# Patient Record
Sex: FEMALE | Race: BLACK OR AFRICAN AMERICAN | NOT HISPANIC OR LATINO | Employment: OTHER | URBAN - METROPOLITAN AREA
[De-identification: names, ages, dates, MRNs, and addresses within clinical notes are randomized per-mention and may not be internally consistent; named-entity substitution may affect disease eponyms.]

---

## 2021-01-04 ENCOUNTER — HOSPITAL ENCOUNTER (EMERGENCY)
Facility: HOSPITAL | Age: 76
Discharge: HOME | End: 2021-01-04
Attending: EMERGENCY MEDICINE | Admitting: EMERGENCY MEDICINE
Payer: COMMERCIAL

## 2021-01-04 VITALS
WEIGHT: 195.33 LBS | BODY MASS INDEX: 35.73 KG/M2 | DIASTOLIC BLOOD PRESSURE: 95 MMHG | SYSTOLIC BLOOD PRESSURE: 218 MMHG | RESPIRATION RATE: 20 BRPM | TEMPERATURE: 100.1 F | OXYGEN SATURATION: 98 % | HEART RATE: 85 BPM

## 2021-01-04 DIAGNOSIS — R04.0 EPISTAXIS: Primary | ICD-10-CM

## 2021-01-04 PROCEDURE — 99283 EMERGENCY DEPT VISIT LOW MDM: CPT

## 2021-01-04 PROCEDURE — 99282 EMERGENCY DEPT VISIT SF MDM: CPT | Performed by: PHYSICIAN ASSISTANT

## 2021-01-04 PROCEDURE — 30901 CONTROL OF NOSEBLEED: CPT | Performed by: PHYSICIAN ASSISTANT

## 2021-01-04 RX ORDER — OXYMETAZOLINE HYDROCHLORIDE 0.05 G/100ML
2 SPRAY NASAL ONCE
Status: COMPLETED | OUTPATIENT
Start: 2021-01-04 | End: 2021-01-04

## 2021-01-04 RX ADMIN — OXYMETAZOLINE HYDROCHLORIDE 2 SPRAY: 0.05 SPRAY NASAL at 16:06

## 2021-01-04 NOTE — ED PROVIDER NOTES
History  Chief Complaint   Patient presents with    Nose Bleed     pt states sneezed "real hard" and started with nose bleed from right nare  pt arrives with bleeding controlled and nasal clamp intact     77-year-old female with past medical history of stroke presenting for evaluation of epistaxis  Patient reports just prior to arrival she sneezed very hard causing nose bleed  Patient states that she called 911 and tempted to apply pressure with resolution however it started to rebleed while on the road to the emergency department for evaluation  Patient states that she is not on any anticoagulation at this time  Denies any headache or dizziness  No blurry vision  Denies any trauma or injury to the nose  None       Past Medical History:   Diagnosis Date    Stroke [facility])        History reviewed  No pertinent surgical history  History reviewed  No pertinent family history  I have reviewed and agree with the history as documented  E-Cigarette/Vaping     E-Cigarette/Vaping Substances     Social History     Tobacco Use    Smoking status: Never Smoker    Smokeless tobacco: Never Used   Substance Use Topics    Alcohol use: Never     Frequency: Never    Drug use: Never       Review of Systems   All other systems reviewed and are negative  Physical Exam  Physical Exam  Vitals signs and nursing note reviewed  Constitutional:       General: She is not in acute distress  Appearance: She is well-developed  HENT:      Head: Normocephalic and atraumatic  Nose:      Comments: Clamp initially visualized on the nose, examined each naris no active bleeding at this time     Mouth/Throat:      Comments: moderate amount of blood within the posterior pharynx and within the mouth  Eyes:      Conjunctiva/sclera: Conjunctivae normal       Comments: EOM grossly intact   Neck:      Musculoskeletal: Normal range of motion and neck supple  Vascular: No JVD     Cardiovascular:      Rate and Rhythm: Normal rate  Pulmonary:      Effort: Pulmonary effort is normal    Abdominal:      Palpations: Abdomen is soft  Musculoskeletal:      Comments: FROM, steady gait, cap refill brisk, strength and sensation grossly intact throughout   Skin:     General: Skin is warm and dry  Capillary Refill: Capillary refill takes less than 2 seconds  Neurological:      Mental Status: She is alert and oriented to person, place, and time  Psychiatric:         Behavior: Behavior normal          Vital Signs  ED Triage Vitals [01/04/21 1548]   Temperature Pulse Respirations Blood Pressure SpO2   100 1 °F (37 8 °C) 85 20 (!) 218/95 98 %      Temp Source Heart Rate Source Patient Position - Orthostatic VS BP Location FiO2 (%)   Tympanic Monitor Lying Left arm --      Pain Score       --           Vitals:    01/04/21 1548   BP: (!) 218/95   Pulse: 85   Patient Position - Orthostatic VS: Lying         Visual Acuity      ED Medications  Medications   oxymetazoline (AFRIN) 0 05 % nasal spray 2 spray (2 sprays Each Nare Given 1/4/21 1606)       Diagnostic Studies  Results Reviewed     None                 No orders to display              Procedures  Procedures         ED Course                                           MDM  Number of Diagnoses or Management Options  Epistaxis:   Diagnosis management comments: 57-year-old female presenting for evaluation after epistaxis caused from sneezing too hard, patient bleeding was controlled however started to rebleed in ambulance ride to the emergency department after removing a clot from her nose  Patient had no bleeding at the time of presentation to emergency department, did still use Afrin soaked gauze in and there with clamp on the nose, she is well appearing, son at bedside states that she takes baby asa every other day, no longer takes anticoagulants because they caused multiple episodes of epistaxis, f/u with pcp as an outpatient    strict return to ED precautions discussed  Pt verbalizes understanding and agrees with plan  Pt is stable for discharge    Portions of the record may have been created with voice recognition software  Occasional wrong word or "sound a like" substitutions may have occurred due to the inherent limitations of voice recognition software  Read the chart carefully and recognize, using context, where substitutions have occurred  Disposition  Final diagnoses:   Epistaxis     Time reflects when diagnosis was documented in both MDM as applicable and the Disposition within this note     Time User Action Codes Description Comment    1/4/2021  4:48 PM Rebecca  Add [R04 0] Epistaxis       ED Disposition     ED Disposition Condition Date/Time Comment    Discharge Stable Mon Jan 4, 2021  4:48 PM Pavel Sky discharge to home/self care  Follow-up Information     Follow up With Specialties Details Why Contact Info Additional [address] Family Medicine Call in 1 day  [address], [address] [facility] [address]  [address], [address], [address] [place], [place], [place] [facility] Emergency [facility] Emergency Medicine Go to  If symptoms worsen [address] [address] [phone]           Patient's Medications    No medications on file     No discharge procedures on file      PDMP Review     None          ED Provider  Electronically Signed by           Petty Valdovinos PA-C  01/04/21 6666

## 2021-05-18 ENCOUNTER — APPOINTMENT (EMERGENCY)
Dept: CT IMAGING | Facility: HOSPITAL | Age: 76
End: 2021-05-18
Payer: COMMERCIAL

## 2021-05-18 ENCOUNTER — HOSPITAL ENCOUNTER (OUTPATIENT)
Facility: HOSPITAL | Age: 76
Setting detail: OBSERVATION
Discharge: HOME | End: 2021-05-19
Attending: EMERGENCY MEDICINE | Admitting: STUDENT IN AN ORGANIZED HEALTH CARE EDUCATION/TRAINING PROGRAM
Payer: COMMERCIAL

## 2021-05-18 DIAGNOSIS — R56.9 SEIZURE-LIKE ACTIVITY (HCC): Primary | ICD-10-CM

## 2021-05-18 PROBLEM — E78.5 HYPERLIPIDEMIA: Status: ACTIVE | Noted: 2021-05-18

## 2021-05-18 PROBLEM — I48.91 ATRIAL FIBRILLATION (HCC): Status: ACTIVE | Noted: 2021-05-18

## 2021-05-18 PROBLEM — D86.89 NEUROSARCOIDOSIS: Status: ACTIVE | Noted: 2021-05-18

## 2021-05-18 PROBLEM — I63.9 STROKE (HCC): Status: ACTIVE | Noted: 2021-05-18

## 2021-05-18 PROBLEM — E03.9 HYPOTHYROIDISM: Status: ACTIVE | Noted: 2021-05-18

## 2021-05-18 PROBLEM — I50.22 CHRONIC SYSTOLIC HEART FAILURE (HCC): Status: ACTIVE | Noted: 2021-05-18

## 2021-05-18 PROBLEM — I10 ESSENTIAL HYPERTENSION: Status: ACTIVE | Noted: 2021-05-18

## 2021-05-18 LAB
ALBUMIN SERPL BCP-MCNC: 3.7 G/DL (ref 3.5–5)
ALP SERPL-CCNC: 199 U/L (ref 46–116)
ALT SERPL W P-5'-P-CCNC: 18 U/L (ref 12–78)
ANION GAP SERPL CALCULATED.3IONS-SCNC: 8 MMOL/L (ref 4–13)
AST SERPL W P-5'-P-CCNC: 16 U/L (ref 5–45)
ATRIAL RATE: 159 BPM
ATRIAL RATE: 326 BPM
ATRIAL RATE: 340 BPM
BASOPHILS # BLD AUTO: 0.05 THOUSANDS/ΜL (ref 0–0.1)
BASOPHILS NFR BLD AUTO: 1 % (ref 0–1)
BILIRUB SERPL-MCNC: 0.4 MG/DL (ref 0.2–1)
BUN SERPL-MCNC: 9 MG/DL (ref 5–25)
CALCIUM SERPL-MCNC: 8.5 MG/DL (ref 8.3–10.1)
CHLORIDE SERPL-SCNC: 104 MMOL/L (ref 100–108)
CO2 SERPL-SCNC: 32 MMOL/L (ref 21–32)
CREAT SERPL-MCNC: 1.15 MG/DL (ref 0.6–1.3)
EOSINOPHIL # BLD AUTO: 0.2 THOUSAND/ΜL (ref 0–0.61)
EOSINOPHIL NFR BLD AUTO: 2 % (ref 0–6)
ERYTHROCYTE [DISTWIDTH] IN BLOOD BY AUTOMATED COUNT: 15.4 % (ref 11.6–15.1)
GFR SERPL CREATININE-BSD FRML MDRD: 53 ML/MIN/1.73SQ M
GLUCOSE SERPL-MCNC: 160 MG/DL (ref 65–140)
GLUCOSE SERPL-MCNC: 161 MG/DL (ref 65–140)
HCT VFR BLD AUTO: 42.9 % (ref 34.8–46.1)
HGB BLD-MCNC: 13.6 G/DL (ref 11.5–15.4)
IMM GRANULOCYTES # BLD AUTO: 0.08 THOUSAND/UL (ref 0–0.2)
IMM GRANULOCYTES NFR BLD AUTO: 1 % (ref 0–2)
LYMPHOCYTES # BLD AUTO: 1.82 THOUSANDS/ΜL (ref 0.6–4.47)
LYMPHOCYTES NFR BLD AUTO: 19 % (ref 14–44)
MCH RBC QN AUTO: 30.8 PG (ref 26.8–34.3)
MCHC RBC AUTO-ENTMCNC: 31.7 G/DL (ref 31.4–37.4)
MCV RBC AUTO: 97 FL (ref 82–98)
MONOCYTES # BLD AUTO: 0.69 THOUSAND/ΜL (ref 0.17–1.22)
MONOCYTES NFR BLD AUTO: 7 % (ref 4–12)
NEUTROPHILS # BLD AUTO: 6.84 THOUSANDS/ΜL (ref 1.85–7.62)
NEUTS SEG NFR BLD AUTO: 70 % (ref 43–75)
NRBC BLD AUTO-RTO: 0 /100 WBCS
PHENYTOIN SERPL-MCNC: 19.5 UG/ML (ref 10–20)
PLATELET # BLD AUTO: 173 THOUSANDS/UL (ref 149–390)
PMV BLD AUTO: 9.9 FL (ref 8.9–12.7)
POTASSIUM SERPL-SCNC: 3.3 MMOL/L (ref 3.5–5.3)
PROT SERPL-MCNC: 7.4 G/DL (ref 6.4–8.2)
QRS AXIS: -43 DEGREES
QRS AXIS: -44 DEGREES
QRS AXIS: -50 DEGREES
QRSD INTERVAL: 144 MS
QRSD INTERVAL: 146 MS
QRSD INTERVAL: 146 MS
QT INTERVAL: 418 MS
QT INTERVAL: 442 MS
QT INTERVAL: 470 MS
QTC INTERVAL: 428 MS
QTC INTERVAL: 430 MS
QTC INTERVAL: 431 MS
RBC # BLD AUTO: 4.41 MILLION/UL (ref 3.81–5.12)
SODIUM SERPL-SCNC: 144 MMOL/L (ref 136–145)
T WAVE AXIS: 149 DEGREES
T WAVE AXIS: 150 DEGREES
T WAVE AXIS: 177 DEGREES
TSH SERPL DL<=0.05 MIU/L-ACNC: 0.04 UIU/ML (ref 0.36–3.74)
VENTRICULAR RATE: 50 BPM
VENTRICULAR RATE: 57 BPM
VENTRICULAR RATE: 64 BPM
WBC # BLD AUTO: 9.68 THOUSAND/UL (ref 4.31–10.16)

## 2021-05-18 PROCEDURE — 80053 COMPREHEN METABOLIC PANEL: CPT | Performed by: EMERGENCY MEDICINE

## 2021-05-18 PROCEDURE — 93005 ELECTROCARDIOGRAM TRACING: CPT

## 2021-05-18 PROCEDURE — 99205 OFFICE O/P NEW HI 60 MIN: CPT | Performed by: NURSE PRACTITIONER

## 2021-05-18 PROCEDURE — 93010 ELECTROCARDIOGRAM REPORT: CPT | Performed by: INTERNAL MEDICINE

## 2021-05-18 PROCEDURE — 80185 ASSAY OF PHENYTOIN TOTAL: CPT | Performed by: EMERGENCY MEDICINE

## 2021-05-18 PROCEDURE — 99220 PR INITIAL OBSERVATION CARE/DAY 70 MINUTES: CPT | Performed by: STUDENT IN AN ORGANIZED HEALTH CARE EDUCATION/TRAINING PROGRAM

## 2021-05-18 PROCEDURE — 82948 REAGENT STRIP/BLOOD GLUCOSE: CPT

## 2021-05-18 PROCEDURE — 85025 COMPLETE CBC W/AUTO DIFF WBC: CPT | Performed by: EMERGENCY MEDICINE

## 2021-05-18 PROCEDURE — 96374 THER/PROPH/DIAG INJ IV PUSH: CPT

## 2021-05-18 PROCEDURE — 84443 ASSAY THYROID STIM HORMONE: CPT | Performed by: EMERGENCY MEDICINE

## 2021-05-18 PROCEDURE — 36415 COLL VENOUS BLD VENIPUNCTURE: CPT | Performed by: EMERGENCY MEDICINE

## 2021-05-18 PROCEDURE — 99285 EMERGENCY DEPT VISIT HI MDM: CPT

## 2021-05-18 PROCEDURE — 80175 DRUG SCREEN QUAN LAMOTRIGINE: CPT | Performed by: PHYSICIAN ASSISTANT

## 2021-05-18 PROCEDURE — 99285 EMERGENCY DEPT VISIT HI MDM: CPT | Performed by: EMERGENCY MEDICINE

## 2021-05-18 RX ORDER — HYDRALAZINE HYDROCHLORIDE 20 MG/ML
5 INJECTION INTRAMUSCULAR; INTRAVENOUS EVERY 6 HOURS PRN
Status: DISCONTINUED | OUTPATIENT
Start: 2021-05-18 | End: 2021-05-19 | Stop reason: HOSPADM

## 2021-05-18 RX ORDER — LAMOTRIGINE 200 MG/1
250 TABLET ORAL 2 TIMES DAILY
COMMUNITY

## 2021-05-18 RX ORDER — HYDRALAZINE HYDROCHLORIDE 20 MG/ML
5 INJECTION INTRAMUSCULAR; INTRAVENOUS ONCE
Status: COMPLETED | OUTPATIENT
Start: 2021-05-18 | End: 2021-05-18

## 2021-05-18 RX ORDER — LORATADINE 10 MG/1
10 TABLET ORAL DAILY
Status: DISCONTINUED | OUTPATIENT
Start: 2021-05-19 | End: 2021-05-19 | Stop reason: HOSPADM

## 2021-05-18 RX ORDER — FUROSEMIDE 40 MG/1
40 TABLET ORAL DAILY
Status: DISCONTINUED | OUTPATIENT
Start: 2021-05-19 | End: 2021-05-19 | Stop reason: HOSPADM

## 2021-05-18 RX ORDER — LEVOTHYROXINE SODIUM 88 UG/1
88 TABLET ORAL
Status: DISCONTINUED | OUTPATIENT
Start: 2021-05-19 | End: 2021-05-19 | Stop reason: HOSPADM

## 2021-05-18 RX ORDER — POTASSIUM CHLORIDE 750 MG/1
10 CAPSULE, EXTENDED RELEASE ORAL 3 TIMES DAILY
COMMUNITY

## 2021-05-18 RX ORDER — ATORVASTATIN CALCIUM 10 MG/1
10 TABLET, FILM COATED ORAL DAILY
COMMUNITY

## 2021-05-18 RX ORDER — LORAZEPAM 1 MG/1
0.5 TABLET ORAL EVERY 8 HOURS PRN
COMMUNITY

## 2021-05-18 RX ORDER — ATORVASTATIN CALCIUM 10 MG/1
10 TABLET, FILM COATED ORAL
Status: DISCONTINUED | OUTPATIENT
Start: 2021-05-18 | End: 2021-05-19 | Stop reason: HOSPADM

## 2021-05-18 RX ORDER — LEVOTHYROXINE SODIUM 0.05 MG/1
50 TABLET ORAL DAILY
COMMUNITY

## 2021-05-18 RX ORDER — LOSARTAN POTASSIUM 100 MG/1
100 TABLET ORAL DAILY
COMMUNITY

## 2021-05-18 RX ORDER — ALLOPURINOL 100 MG/1
100 TABLET ORAL DAILY
Status: DISCONTINUED | OUTPATIENT
Start: 2021-05-19 | End: 2021-05-19 | Stop reason: HOSPADM

## 2021-05-18 RX ORDER — PHENYTOIN SODIUM 100 MG/1
100 CAPSULE, EXTENDED RELEASE ORAL DAILY
COMMUNITY

## 2021-05-18 RX ORDER — LORAZEPAM 1 MG/1
0.5 TABLET ORAL EVERY 8 HOURS PRN
Status: DISCONTINUED | OUTPATIENT
Start: 2021-05-18 | End: 2021-05-19 | Stop reason: HOSPADM

## 2021-05-18 RX ORDER — ASPIRIN 81 MG/1
81 TABLET ORAL EVERY OTHER DAY
COMMUNITY

## 2021-05-18 RX ORDER — LEVOTHYROXINE SODIUM 0.1 MG/1
100 TABLET ORAL
Status: DISCONTINUED | OUTPATIENT
Start: 2021-05-19 | End: 2021-05-18

## 2021-05-18 RX ORDER — FAMOTIDINE 20 MG/1
20 TABLET, FILM COATED ORAL
Status: DISCONTINUED | OUTPATIENT
Start: 2021-05-19 | End: 2021-05-19 | Stop reason: HOSPADM

## 2021-05-18 RX ORDER — FAMOTIDINE 20 MG/1
20 TABLET, FILM COATED ORAL 2 TIMES DAILY
COMMUNITY

## 2021-05-18 RX ORDER — PHENYTOIN SODIUM 100 MG/1
200 CAPSULE, EXTENDED RELEASE ORAL
Status: DISCONTINUED | OUTPATIENT
Start: 2021-05-20 | End: 2021-05-19 | Stop reason: HOSPADM

## 2021-05-18 RX ORDER — AMLODIPINE BESYLATE 5 MG/1
5 TABLET ORAL DAILY
Status: DISCONTINUED | OUTPATIENT
Start: 2021-05-19 | End: 2021-05-19 | Stop reason: HOSPADM

## 2021-05-18 RX ORDER — ASPIRIN 81 MG/1
81 TABLET ORAL EVERY OTHER DAY
Status: DISCONTINUED | OUTPATIENT
Start: 2021-05-18 | End: 2021-05-19 | Stop reason: HOSPADM

## 2021-05-18 RX ORDER — NYSTATIN 100000 [USP'U]/G
POWDER TOPICAL AS NEEDED
COMMUNITY

## 2021-05-18 RX ORDER — PHENYTOIN SODIUM 100 MG/1
100 CAPSULE, EXTENDED RELEASE ORAL
Status: DISCONTINUED | OUTPATIENT
Start: 2021-05-19 | End: 2021-05-19 | Stop reason: HOSPADM

## 2021-05-18 RX ORDER — FUROSEMIDE 40 MG/1
40 TABLET ORAL DAILY
COMMUNITY

## 2021-05-18 RX ORDER — ALLOPURINOL 100 MG/1
100 TABLET ORAL DAILY
COMMUNITY

## 2021-05-18 RX ORDER — NYSTATIN 100000 [USP'U]/G
POWDER TOPICAL 2 TIMES DAILY
Status: DISCONTINUED | OUTPATIENT
Start: 2021-05-18 | End: 2021-05-19 | Stop reason: HOSPADM

## 2021-05-18 RX ORDER — AMLODIPINE BESYLATE 5 MG/1
5 TABLET ORAL DAILY
COMMUNITY

## 2021-05-18 RX ORDER — LOSARTAN POTASSIUM 50 MG/1
100 TABLET ORAL DAILY
Status: DISCONTINUED | OUTPATIENT
Start: 2021-05-19 | End: 2021-05-19 | Stop reason: HOSPADM

## 2021-05-18 RX ORDER — PREDNISONE 1 MG/1
7.5 TABLET ORAL DAILY
COMMUNITY

## 2021-05-18 RX ORDER — FEXOFENADINE HCL 180 MG/1
180 TABLET ORAL DAILY
COMMUNITY
End: 2022-08-05 | Stop reason: ALTCHOICE

## 2021-05-18 RX ADMIN — ATORVASTATIN CALCIUM 10 MG: 10 TABLET, FILM COATED ORAL at 18:05

## 2021-05-18 RX ADMIN — LORAZEPAM 0.5 MG: 1 TABLET ORAL at 22:08

## 2021-05-18 RX ADMIN — HYDRALAZINE HYDROCHLORIDE 5 MG: 20 INJECTION, SOLUTION INTRAMUSCULAR; INTRAVENOUS at 12:19

## 2021-05-18 RX ADMIN — LAMOTRIGINE 250 MG: 25 TABLET ORAL at 18:04

## 2021-05-18 RX ADMIN — NYSTATIN: 100000 POWDER TOPICAL at 18:04

## 2021-05-18 RX ADMIN — ASPIRIN 81 MG: 81 TABLET ORAL at 18:04

## 2021-05-18 NOTE — ED NOTES
Messaged Dr Jeff Grace about patient's blood pressure states okay to transfer to inpatient bed with that pressure     Uday Edwards RN  05/18/21 2896

## 2021-05-18 NOTE — CONSULTS
Consultation - Stroke   Tory Glover 66 y o  female MRN: [id number]  Unit/Bed#: ED 20 Encounter: [id number]      Assessment/Plan     Seizure-like activity [facility])  Assessment & Plan  Tory Glover is a 78-year-old female with AFib, neurosarcoidosis, seizures, who presents today with reported seizure at home per patient  History obtained from son  Son reports that patient was in her normal state of health this morning arose from bed ate breakfast, and then roughly around 9:40 a m  Patient with severe  anxiety and agitation, yelling "I am not okay, something is wrong"  Patient reportedly stated to son that she had a seizure  This was not witnessed by son  Son reports there was no incontinence or tongue bite evident however he phoned PCP who stated that EMS should be called and transferred to [facility] ED for further evaluation  On arrival to the ED patient hypertensive 208/99  Patient's son reports this is abnormal for her as he checked her blood pressure at home  He did check her blood pressure this morning when she was feeling anxious and his reading was in the 759 systolic  Patient has had documented allergy to iodine  Therefore CTA was not performed in the ED  CT head unremarkable for acute abnormalities  Given patient's return to baseline, more concern for breakthrough seizure than stroke at this time  Can discontinue stroke pathway at this time  Plan as outlined below  Plan:  - Will check lamotrigine and phenytoin level  - Unable to obtain CTA at this time given patient has iodine allergy    - Current AED regimen: ( Son reports that the patient took both Lamictal and dilantin this am prior to arrival at hospital)   - Lamictal 250 mg b i d    - Dilantin 100 mg extended release capsule (1 capsule Monday, Wednesday, Friday, 2 capsules Tuesday Thursday Saturday Sunday)  - Administer Ativan prn seizure like activity > 2 min    - Continue telemetry  - Recommend blood pressure control, avoid hypotension  - Continue seizure precautions   - [employer] form not applicable, patient does not drive  - Medical management supportive care per primary team  - Please contact Neurology with any acute change in mental status  TPA Decision: Patient not a TPA candidate  Son present at bedside and reports patient is at baseline  Recommendations for outpatient neurological follow up have yet to be determined  History of Present Illness     Reason for Consult / Principal Problem: stroke alert  Hx and PE limited by: patients son in present  Patient last known well: 940  Stroke alert called: 1275  Neurology time of arrival: 1045 AP arrived  Attending neurologist available via phone immediately  HPI:  Heriberto Grier is a 58-year-old female with AFib, neurosarcoidosis, seizures, who presents today with reported seizure at home per patient  History obtained from son  Son reports that patient was in her normal state of health this morning arose from bed ate breakfast, and then roughly around 9:40 a m  Patient with severe  anxiety and agitation, yelling "I am not okay, something is wrong"  Patient reportedly stated to son that she had a seizure  This was not witnessed by son  Son reports there was no incontinence or tongue bite evident however he phoned PCP who stated that EMS should be called and transferred to [facility] ED for further evaluation  On arrival to the ED patient hypertensive 208/99  NIHSS 6 Patient's son reports this is abnormal for her as he checked her blood pressure at home  He did check her blood pressure this morning when she was feeling anxious and his reading was in the 612 systolic  Patient has had documented allergy to iodine  Therefore CTA was not performed in the ED  CT head unremarkable for acute abnormalities  Of note, patient had chronic meningitis in the early 2000s, eventrally requiring a  shunt    In addition, in 2013 patient suffered a left frontal intracranial hemorrhage that was thought to be secondary to her warfarin at that time which she was on for atrial fibrillation  This hemorrhage was thought to be spontaneous, there was no trauma or obvious trigger  Following this intracranial hemorrhage patient had persistent right-sided weakness and speech problems  Patient was never started on anticoagulation after this episode, and was rather continued on 81 mg daily anti-platelet therapy  Approximately 1 year following her intracranial hemorrhage patient developed seizures which consisted of right arm face jerking and head turn to the right  She was initially started on Keppra which caused her some fatigue  Subsequently this was switched to Dilantin with Lamictal for better seizure control  Per son patient has not had a seizure in 2-3 years  Patient is compliant with medications  Consults    Review of Systems   Constitutional: Negative for activity change, appetite change, chills, diaphoresis, fatigue, fever and unexpected weight change  HENT: Positive for hearing loss  Negative for facial swelling and trouble swallowing  Respiratory: Negative for apnea, chest tightness and shortness of breath  Cardiovascular: Negative for chest pain, palpitations and leg swelling  Gastrointestinal: Negative for diarrhea, nausea and vomiting  Genitourinary: Negative for difficulty urinating  Musculoskeletal: Positive for gait problem  Negative for neck pain and neck stiffness  Neurological: Positive for seizures, facial asymmetry, speech difficulty and weakness  Negative for dizziness, tremors, syncope, light-headedness, numbness and headaches  Psychiatric/Behavioral: The patient is not nervous/anxious  Historical Information   Past Medical History:   Diagnosis Date    Seizure ([address] )     Stroke [facility])      History reviewed  No pertinent surgical history    Social History   Social History     Substance and Sexual Activity   Alcohol Use Never    Frequency: Never     Social History     Substance and Sexual Activity   Drug Use Never     E-Cigarette/Vaping     E-Cigarette/Vaping Substances     Social History     Tobacco Use   Smoking Status Never Smoker   Smokeless Tobacco Never Used     Family History: History reviewed  No pertinent family history  Review of previous medical records was  completed  Meds/Allergies   all current active meds have been reviewed, current meds:   No current facility-administered medications for this encounter  and PTA meds:   Prior to Admission Medications   Prescriptions Last Dose Informant Patient Reported? Taking?    LORazepam (ATIVAN) 1 mg tablet 5/18/2021 at Unknown time  Yes Yes   Sig: Take 0 5 mg by mouth every 8 (eight) hours as needed for anxiety   allopurinol (ZYLOPRIM) 100 mg tablet 5/18/2021 at Unknown time  Yes Yes   Sig: Take 100 mg by mouth daily   amLODIPine (NORVASC) 5 mg tablet 5/18/2021 at Unknown time  Yes Yes   Sig: Take 5 mg by mouth daily   aspirin (ECOTRIN LOW STRENGTH) 81 mg EC tablet 5/18/2021 at Unknown time  Yes Yes   Sig: Take 81 mg by mouth every other day   atorvastatin (LIPITOR) 10 mg tablet 5/18/2021 at Unknown time  Yes Yes   Sig: Take 10 mg by mouth daily   famotidine (PEPCID) 20 mg tablet 5/18/2021 at Unknown time  Yes Yes   Sig: Take 20 mg by mouth 2 (two) times a day   fexofenadine (ALLEGRA) 180 MG tablet 5/17/2021 at Unknown time  Yes Yes   Sig: Take 180 mg by mouth daily   furosemide (LASIX) 40 mg tablet 5/17/2021 at Unknown time  Yes Yes   Sig: Take 40 mg by mouth daily   lamoTRIgine (LaMICtal) 200 MG tablet 5/18/2021 at Unknown time  Yes Yes   Sig: Take 250 mg by mouth 2 (two) times a day   levothyroxine 50 mcg tablet 5/18/2021 at Unknown time  Yes Yes   Sig: Take 50 mcg by mouth daily   losartan (COZAAR) 100 MG tablet 5/18/2021 at Unknown time  Yes Yes   Sig: Take 100 mg by mouth daily   nystatin (MYCOSTATIN) powder   Yes Yes   Sig: Apply topically as needed phenytoin (DILANTIN) 100 mg ER capsule 5/18/2021 at Unknown time  Yes Yes   Sig: Take 100 mg by mouth daily Monday, Wednesday & Friday 100mg  Tuesday, Thursday, Saturday and Sunday- 200mg   potassium chloride (MICRO-K) 10 MEQ CR capsule 5/17/2021 at Unknown time  Yes Yes   Sig: Take 10 mEq by mouth 3 (three) times a day   predniSONE 1 mg tablet 5/18/2021 at Unknown time  Yes Yes   Sig: Take 7 5 mg by mouth daily      Facility-Administered Medications: None       Allergies   Allergen Reactions    Iodine - Food Allergy        Objective   Vitals:Blood pressure (!) 215/84, pulse 57, resp  rate 19, SpO2 98 %  ,There is no height or weight on file to calculate BMI  No intake or output data in the 24 hours ending 05/18/21 1148    Invasive Devices: Invasive Devices     Peripheral Intravenous Line            Peripheral IV 05/18/21 Left Antecubital less than 1 day                Physical Exam  Vitals signs and nursing note reviewed  Constitutional:       General: She is not in acute distress  Appearance: She is not ill-appearing, toxic-appearing or diaphoretic  HENT:      Head: Normocephalic  Right Ear: External ear normal       Left Ear: External ear normal       Nose: Nose normal       Mouth/Throat:      Mouth: Mucous membranes are moist    Eyes:      General: No scleral icterus  Extraocular Movements: Extraocular movements intact  Pupils: Pupils are equal, round, and reactive to light  Neck:      Musculoskeletal: Normal range of motion  Cardiovascular:      Rate and Rhythm: Normal rate  Pulses: Normal pulses  Pulmonary:      Effort: Pulmonary effort is normal  No respiratory distress  Abdominal:      Palpations: Abdomen is soft  Musculoskeletal: Normal range of motion  Right lower leg: No edema  Left lower leg: No edema  Skin:     General: Skin is warm and dry  Capillary Refill: Capillary refill takes less than 2 seconds     Neurological:      Mental Status: She is alert       Deep Tendon Reflexes:      Reflex Scores:       Tricep reflexes are 3+ on the right side and 2+ on the left side  Bicep reflexes are 3+ on the right side and 2+ on the left side  Brachioradialis reflexes are 3+ on the right side and 2+ on the left side  Patellar reflexes are 2+ on the right side and 2+ on the left side  Comments: Outlined below   Psychiatric:         Speech: Speech is slurred  Neurologic Exam     Mental Status   Follows 1 step commands  Attention: normal  Concentration: normal    Speech: slurred   Level of consciousness: alert  Knowledge: good  Able to name object  Able to read  Able to repeat  Patient is awake on patient able states that is 2021 she is disoriented to month date  Patient able to state birth date and that she is in the hospital   Patient is able to read, identify objects and colors, and repeat phrases  Dysarthria noted, which is chronic for patient  Cranial Nerves     CN II   Visual fields full to confrontation  CN III, IV, VI   Pupils are equal, round, and reactive to light  Right pupil: Size: 4 mm  Shape: regular  Left pupil: Size: 4 mm  Shape: regular  Nystagmus: none   Upgaze: normal  Downgaze: normal  Conjugate gaze: present    CN V   Facial sensation intact  CN VII   Right facial weakness: central    CN VIII   Hearing: impaired    CN XII   CN XII normal      Motor Exam   Muscle bulk: decreased  Right arm tone: increased  Left arm tone: normal  Right arm pronator drift: Unable to test given right-sided weakness secondary to hemorrhagic stroke  Left arm pronator drift: absent  Right leg tone: increased  Left leg tone: normal  Left upper extremity 5/5, left lower extremity 5/5  Right upper extremity 5-out of 5 proximal strength  Right  strength 3/5, consistent with baseline per son who was at bedside  Right lower extremity 5-/5 throughout       Sensory Exam   Light touch normal    Pinprick normal  Gait, Coordination, and Reflexes     Reflexes   Right brachioradialis: 3+  Left brachioradialis: 2+  Right biceps: 3+  Left biceps: 2+  Right triceps: 3+  Left triceps: 2+  Right patellar: 2+  Left patellar: 2+  Right plantar: normal  Left plantar: normal  Right ankle clonus: absent  Left ankle clonus: absent  Gait exam deferred at this time  Unable to test finger-to-nose and heel-to-shin coordination on right side given residual weakness  However finger-to-nose and heel-to-shin on left intact  No tremor noted  NIHSS:  1a Level of Consciousness: 0 = Alert   1b  LOC Questions: 1 = Answers one correctly   1c  LOC Commands: 0 = Obeys both correctly   2  Best Gaze: 0 = Normal   3  Visual: 0 = No visual field loss   4  Facial Palsy: 1=Minor paralysis (flattened nasolabial fold, asymmetric on smiling)   5a  Motor Right Arm: 2=Some effort against gravity, limb cannot get to or maintain (if cured) 90 (or 45) degrees, drifts down to bed, but has some effort against gravity   5b  Motor Left Arm: 0=No drift, limb holds 90 (or 45) degrees for full 10 seconds   6a  Motor Right Le=No drift, limb holds 90 (or 45) degrees for full 10 seconds   6b  Motor Left Le=No drift, limb holds 90 (or 45) degrees for full 10 seconds   7  Limb Ataxia:  0=Absent   8  Sensory: 0=Normal; no sensory loss   9  Best Language:  1=Mild to moderate aphasia; some obvious loss of fluency or facility of comprehension without significant limitation on ideas expressed or form of expression  10  Dysarthria: 1=Mild to moderate, patient slurs at least some words and at worst, can be understood with some difficulty   11   Extinction and Inattention (formerly Neglect): 0=No abnormality   Total Score: 6     Time NIHSS was completed: 1050    Modified Whigham Score:  3 (Moderate disability; requiring some help, but able to walk without assistance)    Lab Results:   CBC:   Results from last 7 days   Lab Units 21  1102   WBC Thousand/uL 9  68   RBC Million/uL 4 41   HEMOGLOBIN g/dL 13 6   HEMATOCRIT % 42 9   MCV fL 97   PLATELETS Thousands/uL 173     Imaging Studies: I have personally reviewed pertinent reports  and I have personally reviewed pertinent films in PACS  EKG, Pathology, and Other Studies: I have personally reviewed pertinent reports  and I have personally reviewed pertinent films in PACS  VTE Prophylaxis: Sequential compression device (Venodyne)     Counseling / Coordination of Care  Total Critical Care time spent 50 minutes excluding procedures, teaching and family updates

## 2021-05-18 NOTE — H&P
[address] [place] [address]  H&P- Siddhartha Stones 1945, 66 y o  female MRN: [id number]  Unit/Bed#: E4 -01 Encounter: [id number]  Primary Care Provider: Nehemiah Little MD   Date and time admitted to hospital: 5/18/2021 10:24 AM    * Seizure-like activity [facility])  Assessment & Plan  66year old female with PMH of neurosarcoidosis, warfarin-associated ICH, and post- hemorrhagic epilepsy presented with seizure-like activity   - Neurology on board assisting with management  - Continue dilantin and lamictal  - Await levels    Neurosarcoidosis  Assessment & Plan  Continue steroids    Chronic systolic heart failure (HCC)  Assessment & Plan  Wt Readings from Last 3 Encounters:   05/18/21 75 8 kg (167 lb)   01/04/21 88 6 kg (195 lb 5 2 oz)   07/30/13 95 3 kg (210 lb)     Not in acute exacerbation   - Continue lasix        Atrial fibrillation (HCC)  Assessment & Plan  Rate controlled  Not on ac due to cerebral bleed    Hyperlipidemia  Assessment & Plan  Continue statin    Hypothyroidism  Assessment & Plan  Decreased levothyroxine from 100mcg to 88mcg daily due to suppressed tsh levels    Essential hypertension  Assessment & Plan  Continue losartan / amlodipine  - PRN hydralazine    Stroke [facility])  Assessment & Plan  History of stroke with residual deficits  VTE Prophylaxis: Pharmacologic VTE Prophylaxis contraindicated due to history of intracranial bleed  / sequential compression device   Code Status: full code  Discussion with family: shannan garcia    Anticipated Length of Stay:  Patient will be admitted on an Observation basis with an anticipated length of stay of  < 2 midnights  Justification for Hospital Stay: seizure like symptoms    Total Time for Visit, including Counseling / Coordination of Care: 70 minutes  Greater than 50% of this total time spent on direct patient counseling and coordination of care      Chief Complaint:   Seizure like symptoms    History of Present Illness:    Siddhartha Daugherty is a 66 y o  female who presents with seizure like symptoms  She has a past medical history of atrial fibrillation not on anticoagulation due to intracranial bleed, neurosarcoidosis, seizures, and epilepsy  Patient unable to provide her history accurately thus was obtained through her son  Her son Bia Delgado neuro rates that she was in her normal state of health  Until around [phone] this morning when the patient started complaining that something is wrong  She told her son Bia Delgado that she believes she had a seizure  This was not witnessed  She did not lose any consciousness  There was no incontinence  In the ED, she had hypertensive urgency requiring IV hydralazine for better blood pressure control  CT head was performed which did not show any acute a acute abnormalities  CTA could not be performed because of her iodine allergies  Neurology would like her admitted for further observation due to concern for breakthrough seizure  Review of Systems:    Review of Systems   Constitutional: Negative for appetite change and chills  HENT: Negative for ear pain  Eyes: Negative for pain  Respiratory: Negative for chest tightness, shortness of breath and wheezing  Cardiovascular: Negative for chest pain and palpitations  Gastrointestinal: Negative for abdominal pain, nausea and vomiting  Endocrine: Negative for cold intolerance  Genitourinary: Negative for dysuria  Musculoskeletal: Negative for neck stiffness  Skin: Negative for color change and pallor  Neurological: Positive for seizures and weakness  Psychiatric/Behavioral: Negative for agitation  Past Medical and Surgical History:     Past Medical History:   Diagnosis Date    Seizure ([address] )     Stroke [facility])        History reviewed  No pertinent surgical history  Meds/Allergies:    Prior to Admission medications    Medication Sig Start Date End Date Taking?  Authorizing Provider   allopurinol (ZYLOPRIM) 100 mg tablet Take 100 mg by mouth daily   Yes Historical Provider, MD   amLODIPine (NORVASC) 5 mg tablet Take 5 mg by mouth daily   Yes Historical Provider, MD   aspirin (ECOTRIN LOW STRENGTH) 81 mg EC tablet Take 81 mg by mouth every other day   Yes Historical Provider, MD   atorvastatin (LIPITOR) 10 mg tablet Take 10 mg by mouth daily   Yes Historical Provider, MD   famotidine (PEPCID) 20 mg tablet Take 20 mg by mouth 2 (two) times a day   Yes Historical Provider, MD   fexofenadine (ALLEGRA) 180 MG tablet Take 180 mg by mouth daily   Yes Historical Provider, MD   furosemide (LASIX) 40 mg tablet Take 40 mg by mouth daily   Yes Historical Provider, MD   lamoTRIgine (LaMICtal) 200 MG tablet Take 250 mg by mouth 2 (two) times a day   Yes Historical Provider, MD   levothyroxine 50 mcg tablet Take 50 mcg by mouth daily   Yes Historical Provider, MD   LORazepam (ATIVAN) 1 mg tablet Take 0 5 mg by mouth every 8 (eight) hours as needed for anxiety   Yes Historical Provider, MD   losartan (COZAAR) 100 MG tablet Take 100 mg by mouth daily   Yes Historical Provider, MD   nystatin (MYCOSTATIN) powder Apply topically as needed   Yes Historical Provider, MD   phenytoin (DILANTIN) 100 mg ER capsule Take 100 mg by mouth daily Monday, Wednesday & Friday 100mg  Tuesday, Thursday, Saturday and Sunday- 200mg   Yes Historical Provider, MD   potassium chloride (MICRO-K) 10 MEQ CR capsule Take 10 mEq by mouth 3 (three) times a day   Yes Historical Provider, MD   predniSONE 1 mg tablet Take 7 5 mg by mouth daily   Yes Historical Provider, MD     I have reviewed home medications with patient family member  Allergies: Allergies   Allergen Reactions    Iodine - Food Allergy        Social History:     Marital Status:     Substance Use History:   Social History     Substance and Sexual Activity   Alcohol Use Never    Frequency: Never     Social History     Tobacco Use   Smoking Status Never Smoker   Smokeless Tobacco Never Used     Social History Substance and Sexual Activity   Drug Use Never       Family History:    History reviewed  No pertinent family history  Physical Exam:     Vitals:   Blood Pressure: 160/100 (05/18/21 1553)  Pulse: 59 (05/18/21 1535)  Temperature: (!) 97 °F (36 1 °C) (05/18/21 1535)  Temp Source: Temporal (05/18/21 1535)  Respirations: 18 (05/18/21 1535)  Height: 4' 10" (147 3 cm) (05/18/21 1535)  Weight - Scale: 75 8 kg (167 lb) (05/18/21 1525)  SpO2: 98 % (05/18/21 1535)    Physical Exam  Vitals signs reviewed  Constitutional:       Appearance: She is obese  HENT:      Head: Normocephalic  Nose: Nose normal    Eyes:      General: No scleral icterus  Cardiovascular:      Rate and Rhythm: Bradycardia present  Rhythm irregular  Pulmonary:      Effort: Pulmonary effort is normal  No respiratory distress  Breath sounds: No wheezing or rales  Abdominal:      General: There is no distension  Palpations: Abdomen is soft  Tenderness: There is no abdominal tenderness  Musculoskeletal:      Right lower leg: No edema  Left lower leg: No edema  Skin:     General: Skin is warm  Neurological:      Mental Status: She is alert  She is disoriented  Cranial Nerves: Cranial nerve deficit present  Comments: Dysarthria (Chronic), oriented to year   Psychiatric:         Mood and Affect: Mood normal          Behavior: Behavior normal          Additional Data:     Lab Results: I have personally reviewed pertinent reports        Results from last 7 days   Lab Units 05/18/21  1102   WBC Thousand/uL 9 68   HEMOGLOBIN g/dL 13 6   HEMATOCRIT % 42 9   PLATELETS Thousands/uL 173   NEUTROS PCT % 70   LYMPHS PCT % 19   MONOS PCT % 7   EOS PCT % 2     Results from last 7 days   Lab Units 05/18/21  1015   SODIUM mmol/L 144   POTASSIUM mmol/L 3 3*   CHLORIDE mmol/L 104   CO2 mmol/L 32   BUN mg/dL 9   CREATININE mg/dL 1 15   ANION GAP mmol/L 8   CALCIUM mg/dL 8 5   ALBUMIN g/dL 3 7   TOTAL BILIRUBIN mg/dL 0 40 ALK PHOS U/L 199*   ALT U/L 18   AST U/L 16   GLUCOSE RANDOM mg/dL 160*         Results from last 7 days   Lab Units 05/18/21  1041   POC GLUCOSE mg/dl 161*               Imaging: I have personally reviewed pertinent reports  CT stroke alert brain   Final Result by Marshal Gustafson MD (05/18 3662)      1  No acute intracranial CT abnormality  2   Left frontal encephalomalacia and volume loss representing sequela of remote insult/hemorrhage  3   Stable size of slitlike shunted lateral ventricles  Findings were directly discussed with Deedee Lloyd on 5/18/2021 11:10 AM       Workstation performed: [id number]             EKG, Pathology, and Other Studies Reviewed on Admission:   · EKG: atrial fibrillation at 64 bpm, LAD, LBBB    Allscripts / Epic Records Reviewed: Yes     ** Please Note: This note has been constructed using a voice recognition system   **

## 2021-05-18 NOTE — ED NOTES
Patient's son speaking with neurology at this time, son states that left sided weakness is chronic for pain; CTA is not going to be ordered per neurology        Bridgette Albert, EVE  05/18/21 8080

## 2021-05-18 NOTE — ED PROVIDER NOTES
History  Chief Complaint   Patient presents with    Seizure - Prior Hx Of     pt transported via EMS from home; patient's son reports seizure like activity at home; patient has a history of seizures        History provided by:  Patient and relative   used: No    Seizure - Prior Hx Of  Seizure activity on arrival: no    Seizure type:  Unable to specify  Preceding symptoms comment:  Unable to specify  Initial focality:  Unable to specify  Episode characteristics: abnormal movements    Postictal symptoms: no confusion    Return to baseline: yes    Severity:  Unable to specify  Duration: unable to specify  Timing:  Unable to specify  Number of seizures this episode:  1  Progression:  Resolved  Context comment:  Hx of Stroke, Seizure  Recent head injury:  No recent head injuries  PTA treatment:  None  History of seizures: yes        Prior to Admission Medications   Prescriptions Last Dose Informant Patient Reported? Taking?    LORazepam (ATIVAN) 1 mg tablet 5/18/2021 at Unknown time  Yes Yes   Sig: Take 0 5 mg by mouth every 8 (eight) hours as needed for anxiety   allopurinol (ZYLOPRIM) 100 mg tablet 5/18/2021 at Unknown time  Yes Yes   Sig: Take 100 mg by mouth daily   amLODIPine (NORVASC) 5 mg tablet 5/18/2021 at Unknown time  Yes Yes   Sig: Take 5 mg by mouth daily   aspirin (ECOTRIN LOW STRENGTH) 81 mg EC tablet 5/18/2021 at Unknown time  Yes Yes   Sig: Take 81 mg by mouth every other day   atorvastatin (LIPITOR) 10 mg tablet 5/18/2021 at Unknown time  Yes Yes   Sig: Take 10 mg by mouth daily   famotidine (PEPCID) 20 mg tablet 5/18/2021 at Unknown time  Yes Yes   Sig: Take 20 mg by mouth 2 (two) times a day   fexofenadine (ALLEGRA) 180 MG tablet 5/17/2021 at Unknown time  Yes Yes   Sig: Take 180 mg by mouth daily   furosemide (LASIX) 40 mg tablet 5/17/2021 at Unknown time  Yes Yes   Sig: Take 40 mg by mouth daily   lamoTRIgine (LaMICtal) 200 MG tablet 5/18/2021 at Unknown time  Yes Yes   Sig: Take 250 mg by mouth 2 (two) times a day   levothyroxine 50 mcg tablet 5/18/2021 at Unknown time  Yes Yes   Sig: Take 50 mcg by mouth daily   losartan (COZAAR) 100 MG tablet 5/18/2021 at Unknown time  Yes Yes   Sig: Take 100 mg by mouth daily   nystatin (MYCOSTATIN) powder   Yes Yes   Sig: Apply topically as needed   phenytoin (DILANTIN) 100 mg ER capsule 5/18/2021 at Unknown time  Yes Yes   Sig: Take 100 mg by mouth daily Monday, Wednesday & Friday 100mg  Tuesday, Thursday, Saturday and Sunday- 200mg   potassium chloride (MICRO-K) 10 MEQ CR capsule 5/17/2021 at Unknown time  Yes Yes   Sig: Take 10 mEq by mouth 3 (three) times a day   predniSONE 1 mg tablet 5/18/2021 at Unknown time  Yes Yes   Sig: Take 7 5 mg by mouth daily      Facility-Administered Medications: None       Past Medical History:   Diagnosis Date    Seizure ([facility] [address] )     Stroke [facility])        History reviewed  No pertinent surgical history  History reviewed  No pertinent family history  I have reviewed and agree with the history as documented  E-Cigarette/Vaping     E-Cigarette/Vaping Substances     Social History     Tobacco Use    Smoking status: Never Smoker    Smokeless tobacco: Never Used   Substance Use Topics    Alcohol use: Never     Frequency: Never    Drug use: Never       Review of Systems   Constitutional: Negative for chills and fever  HENT: Negative for facial swelling, sore throat and trouble swallowing  Eyes: Negative for pain and visual disturbance  Respiratory: Negative for cough and shortness of breath  Cardiovascular: Negative for chest pain and leg swelling  Gastrointestinal: Negative for abdominal pain, diarrhea, nausea and vomiting  Genitourinary: Negative for dysuria and flank pain  Musculoskeletal: Negative for back pain, neck pain and neck stiffness  Skin: Negative for pallor and rash  Allergic/Immunologic: Negative for environmental allergies and immunocompromised state     Neurological: Positive for seizures, speech difficulty and weakness  Negative for dizziness and headaches  Dysarthria, facial droop, right-sided weakness at baseline   Hematological: Negative for adenopathy  Does not bruise/bleed easily  Psychiatric/Behavioral: Negative for agitation and behavioral problems  All other systems reviewed and are negative  Physical Exam  Physical Exam  Vitals signs and nursing note reviewed  Constitutional:       General: She is not in acute distress  Appearance: She is well-developed  HENT:      Head: Normocephalic and atraumatic  Eyes:      Extraocular Movements: Extraocular movements intact  Pupils: Pupils are equal, round, and reactive to light  Neck:      Musculoskeletal: Normal range of motion and neck supple  Cardiovascular:      Rate and Rhythm: Normal rate and regular rhythm  Pulmonary:      Effort: Pulmonary effort is normal  No respiratory distress  Abdominal:      Palpations: Abdomen is soft  Tenderness: There is no abdominal tenderness  There is no guarding or rebound  Musculoskeletal: Normal range of motion  Skin:     General: Skin is warm and dry  Neurological:      Mental Status: She is alert and oriented to person, place, and time        Comments: Dysarthria, facial droop, right-sided weakness at baseline   Psychiatric:         Mood and Affect: Mood normal          Behavior: Behavior normal          Vital Signs  ED Triage Vitals   Temperature Pulse Respirations Blood Pressure SpO2   05/18/21 1339 05/18/21 1026 05/18/21 1026 05/18/21 1027 05/18/21 1026   97 8 °F (36 6 °C) 76 20 (!) 208/99 97 %      Temp Source Heart Rate Source Patient Position - Orthostatic VS BP Location FiO2 (%)   05/18/21 1339 05/18/21 1026 05/18/21 1221 05/18/21 1221 --   Oral Monitor Lying Left arm       Pain Score       05/18/21 1527       No Pain           Vitals:    05/18/21 1421 05/18/21 1430 05/18/21 1535 05/18/21 1553   BP: (!) 184/81 (!) 184/81  160/100   Pulse: (!) 54 59    Patient Position - Orthostatic VS: Lying  Lying          Visual Acuity  Visual Acuity      Most Recent Value   L Pupil Size (mm)  4   R Pupil Size (mm)  4   L Pupil Shape  Round   R Pupil Shape  Round          ED Medications  Medications   hydrALAZINE (APRESOLINE) injection 5 mg (has no administration in time range)   loratadine (CLARITIN) tablet 10 mg (has no administration in time range)   allopurinol (ZYLOPRIM) tablet 100 mg (has no administration in time range)   amLODIPine (NORVASC) tablet 5 mg (has no administration in time range)   aspirin (ECOTRIN LOW STRENGTH) EC tablet 81 mg (has no administration in time range)   atorvastatin (LIPITOR) tablet 10 mg (has no administration in time range)   famotidine (PEPCID) tablet 20 mg (has no administration in time range)   furosemide (LASIX) tablet 40 mg (has no administration in time range)   lamoTRIgine (LaMICtal) tablet 250 mg (has no administration in time range)   losartan (COZAAR) tablet 100 mg (has no administration in time range)   predniSONE tablet 7 5 mg (has no administration in time range)   nystatin (MYCOSTATIN) powder (has no administration in time range)   LORazepam (ATIVAN) tablet 0 5 mg (has no administration in time range)   enoxaparin (LOVENOX) subcutaneous injection 40 mg (has no administration in time range)   levothyroxine tablet 88 mcg (has no administration in time range)   hydrALAZINE (APRESOLINE) injection 5 mg (5 mg Intravenous Given 5/18/21 1219)       Diagnostic Studies  Results Reviewed     Procedure Component Value Units Date/Time    Phenytoin level, total [[id number]]  (Normal) Collected: 05/18/21 1015    Lab Status: Final result Specimen: Blood from Arm, Left Updated: 05/18/21 1134     Phenytoin Lvl 19 5 ug/mL     TSH [[id number]]  (Abnormal) Collected: 05/18/21 1015    Lab Status: Final result Specimen: Blood from Arm, Left Updated: 05/18/21 1132     TSH 3RD GENERATON 0 039 uIU/mL     Narrative:      Patients undergoing fluorescein dye angiography may retain small amounts of fluorescein in the body for 48-72 hours post procedure  Samples containing fluorescein can produce falsely depressed TSH values  If the patient had this procedure,a specimen should be resubmitted post fluorescein clearance  Comprehensive metabolic panel [[id number]]  (Abnormal) Collected: 05/18/21 1015    Lab Status: Final result Specimen: Blood from Arm, Left Updated: 05/18/21 1128     Sodium 144 mmol/L      Potassium 3 3 mmol/L      Chloride 104 mmol/L      CO2 32 mmol/L      ANION GAP 8 mmol/L      BUN 9 mg/dL      Creatinine 1 15 mg/dL      Glucose 160 mg/dL      Calcium 8 5 mg/dL      AST 16 U/L      ALT 18 U/L      Alkaline Phosphatase 199 U/L      Total Protein 7 4 g/dL      Albumin 3 7 g/dL      Total Bilirubin 0 40 mg/dL      eGFR 53 ml/min/1 73sq m     Narrative:      [facility] guidelines for Chronic Kidney Disease (CKD):     Stage 1 with normal or high GFR (GFR > 90 mL/min/1 73 square meters)    Stage 2 Mild CKD (GFR = 60-89 mL/min/1 73 square meters)    Stage 3A Moderate CKD (GFR = 45-59 mL/min/1 73 square meters)    Stage 3B Moderate CKD (GFR = 30-44 mL/min/1 73 square meters)    Stage 4 Severe CKD (GFR = 15-29 mL/min/1 73 square meters)    Stage 5 End Stage CKD (GFR <15 mL/min/1 73 square meters)  Note: GFR calculation is accurate only with a steady state creatinine    Lamotrigine level [[id number]] Collected: 05/18/21 1109    Lab Status:  In process Specimen: Blood from Arm, Left Updated: 05/18/21 1113    Fingerstick Glucose (POCT) [[id number]]  (Abnormal) Collected: 05/18/21 1041    Lab Status: Final result Updated: 05/18/21 1111     POC Glucose 161 mg/dl     CBC and differential [[id number]]  (Abnormal) Collected: 05/18/21 1102    Lab Status: Final result Specimen: Blood from Arm, Left Updated: 05/18/21 1110     WBC 9 68 Thousand/uL      RBC 4 41 Million/uL      Hemoglobin 13 6 g/dL      Hematocrit 42 9 %      MCV 97 fL      MCH 30 8 pg      MCHC 31 7 g/dL      RDW 15 4 %      MPV 9 9 fL      Platelets 733 Thousands/uL      nRBC 0 /100 WBCs      Neutrophils Relative 70 %      Immat GRANS % 1 %      Lymphocytes Relative 19 %      Monocytes Relative 7 %      Eosinophils Relative 2 %      Basophils Relative 1 %      Neutrophils Absolute 6 84 Thousands/µL      Immature Grans Absolute 0 08 Thousand/uL      Lymphocytes Absolute 1 82 Thousands/µL      Monocytes Absolute 0 69 Thousand/µL      Eosinophils Absolute 0 20 Thousand/µL      Basophils Absolute 0 05 Thousands/µL                  CT stroke alert brain   Final Result by Justus Mendoza MD (05/18 0245)      1  No acute intracranial CT abnormality  2   Left frontal encephalomalacia and volume loss representing sequela of remote insult/hemorrhage  3   Stable size of slitlike shunted lateral ventricles                 Findings were directly discussed with Ravindra Person on 5/18/2021 11:10 AM       Workstation performed: [id number]                    Procedures  ECG 12 Lead Documentation Only    Date/Time: 5/18/2021 11:55 AM  Performed by: Barbie Jorge MD  Authorized by: Barbie Jorge MD     Indications / Diagnosis:  Seizure  ECG reviewed by me, the ED Provider: yes    Patient location:  ED  Interpretation:     Interpretation: normal    Rate:     ECG rate assessment: bradycardic    Rhythm:     Rhythm: sinus rhythm    Ectopy:     Ectopy: none    QRS:     QRS axis:  Normal  Conduction:     Conduction: normal    ST segments:     ST segments:  Normal  T waves:     T waves: normal               ED Course  ED Course as of May 18 1716   Tue May 18, 2021   1051 Case discussed with Dr Lubna Young, neuro, agree with CT Brain, hold off on CTA as patient allergic to IV dye and also her neuro deficits have been pre-existing as reviewed in neuro notes from [facility] THE [facility]       1331 Patient continued to have elevated blood pressures, discussed with neurologist, advised to admit for hypertensive urgency as may lower the threshold for seizure, will give hydralazine  Blood Pressure(!): 198/89                 Stroke Assessment     Row Name 05/18/21 1051             NIH Stroke Scale    Interval  Baseline      Level of Consciousness (1a )  0      LOC Questions (1b )  0      LOC Commands (1c )  0      Best Gaze (2 )  0      Visual (3 )  0      Facial Palsy (4 )  1      Motor Arm, Left (5a )  0      Motor Arm, Right (5b )  1      Motor Leg, Left (6a )  0      Motor Leg, Right (6b )  1      Limb Ataxia (7 )  0      Sensory (8 )  0      Best Language (9 )  0      Dysarthria (10 )  2      Extinction and Inattention (11 ) (Formerly Neglect)  0      Total  5          Note: Neuro deficits at baseline from prior Stroke as reviewed in [facility] Neurology notes  SBIRT 22yo+      Most Recent Value   SBIRT (22 yo +)   In order to provide better care to our patients, we are screening all of our patients for alcohol and drug use  Would it be okay to ask you these screening questions?   No Filed at: 05/18/2021 1228                    MDM  Number of Diagnoses or Management Options  Seizure-like activity [facility]): new and requires workup     Amount and/or Complexity of Data Reviewed  Clinical lab tests: reviewed and ordered  Tests in the radiology section of CPT®: ordered and reviewed  Tests in the medicine section of CPT®: ordered and reviewed  Discuss the patient with other providers: yes  Independent visualization of images, tracings, or specimens: yes        Disposition  Final diagnoses:   Seizure-like activity ([address] )     Time reflects when diagnosis was documented in both MDM as applicable and the Disposition within this note     Time User Action Codes Description Comment    5/18/2021  1:34 PM Katie Anders Add [R56 9] Seizure-like activity [facility])       ED Disposition     ED Disposition Condition Date/Time Comment    Admit Stable Tue May 18, 2021  1:35 PM Case was discussed with Dr Jordi Sandoval and the patient's admission status was agreed to be Admission Status: observation status to the service of Dr Roldan Briceño         Follow-up Information    None         Current Discharge Medication List      CONTINUE these medications which have NOT CHANGED    Details   allopurinol (ZYLOPRIM) 100 mg tablet Take 100 mg by mouth daily      amLODIPine (NORVASC) 5 mg tablet Take 5 mg by mouth daily      aspirin (ECOTRIN LOW STRENGTH) 81 mg EC tablet Take 81 mg by mouth every other day      atorvastatin (LIPITOR) 10 mg tablet Take 10 mg by mouth daily      famotidine (PEPCID) 20 mg tablet Take 20 mg by mouth 2 (two) times a day      fexofenadine (ALLEGRA) 180 MG tablet Take 180 mg by mouth daily      furosemide (LASIX) 40 mg tablet Take 40 mg by mouth daily      lamoTRIgine (LaMICtal) 200 MG tablet Take 250 mg by mouth 2 (two) times a day      levothyroxine 50 mcg tablet Take 50 mcg by mouth daily      LORazepam (ATIVAN) 1 mg tablet Take 0 5 mg by mouth every 8 (eight) hours as needed for anxiety      losartan (COZAAR) 100 MG tablet Take 100 mg by mouth daily      nystatin (MYCOSTATIN) powder Apply topically as needed      phenytoin (DILANTIN) 100 mg ER capsule Take 100 mg by mouth daily Monday, Wednesday & Friday 100mg  Tuesday, Thursday, Saturday and Sunday- 200mg      potassium chloride (MICRO-K) 10 MEQ CR capsule Take 10 mEq by mouth 3 (three) times a day      predniSONE 1 mg tablet Take 7 5 mg by mouth daily           No discharge procedures on file      PDMP Review     None          ED Provider  Electronically Signed by           Rajeev Sharp MD  05/18/21 0384

## 2021-05-18 NOTE — PLAN OF CARE
Problem: Potential for Falls  Goal: Patient will remain free of falls  Description: INTERVENTIONS:  - Assess patient frequently for physical needs  -  Identify cognitive and physical deficits and behaviors that affect risk of falls    -  [place] fall precautions as indicated by assessment   - Educate patient/family on patient safety including physical limitations  - Instruct patient to call for assistance with activity based on assessment  - Modify environment to reduce risk of injury  - Consider OT/PT consult to assist with strengthening/mobility  Outcome: Progressing     Problem: Prexisting or High Potential for Compromised Skin Integrity  Goal: Skin integrity is maintained or improved  Description: INTERVENTIONS:  - Identify patients at risk for skin breakdown  - Assess and monitor skin integrity  - Assess and monitor nutrition and hydration status  - Monitor labs   - Assess for incontinence   - Turn and reposition patient  - Assist with mobility/ambulation  - Relieve pressure over bony prominences  - Avoid friction and shearing  - Provide appropriate hygiene as needed including keeping skin clean and dry  - Evaluate need for skin moisturizer/barrier cream  - Collaborate with interdisciplinary team   - Patient/family teaching  - Consider wound care consult   Outcome: Progressing     Problem: PAIN - ADULT  Goal: Verbalizes/displays adequate comfort level or baseline comfort level  Description: Interventions:  - Encourage patient to monitor pain and request assistance  - Assess pain using appropriate pain scale  - Administer analgesics based on type and severity of pain and evaluate response  - Implement non-pharmacological measures as appropriate and evaluate response  - Consider cultural and social influences on pain and pain management  - Notify physician/advanced practitioner if interventions unsuccessful or patient reports new pain  Outcome: Progressing     Problem: INFECTION - ADULT  Goal: Absence or prevention of progression during hospitalization  Description: INTERVENTIONS:  - Assess and monitor for signs and symptoms of infection  - Monitor lab/diagnostic results  - Monitor all insertion sites, i e  indwelling lines, tubes, and drains  - Monitor endotracheal if appropriate and nasal secretions for changes in amount and color  - [place] appropriate cooling/warming therapies per order  - Administer medications as ordered  - Instruct and encourage patient and family to use good hand hygiene technique  - Identify and instruct in appropriate isolation precautions for identified infection/condition  Outcome: Progressing     Problem: SAFETY ADULT  Goal: Patient will remain free of falls  Description: INTERVENTIONS:  - Assess patient frequently for physical needs  -  Identify cognitive and physical deficits and behaviors that affect risk of falls    -  [place] fall precautions as indicated by assessment   - Educate patient/family on patient safety including physical limitations  - Instruct patient to call for assistance with activity based on assessment  - Modify environment to reduce risk of injury  - Consider OT/PT consult to assist with strengthening/mobility  Outcome: Progressing  Goal: Maintain or return to baseline ADL function  Description: INTERVENTIONS:  -  Assess patient's ability to carry out ADLs; assess patient's baseline for ADL function and identify physical deficits which impact ability to perform ADLs (bathing, care of mouth/teeth, toileting, grooming, dressing, etc )  - Assess/evaluate cause of self-care deficits   - Assess range of motion  - Assess patient's mobility; develop plan if impaired  - Assess patient's need for assistive devices and provide as appropriate  - Encourage maximum independence but intervene and supervise when necessary  - Involve family in performance of ADLs  - Assess for home care needs following discharge   - Consider OT consult to assist with ADL evaluation and planning for discharge  - Provide patient education as appropriate  Outcome: Progressing  Goal: Maintain or return mobility status to optimal level  Description: INTERVENTIONS:  - Assess patient's baseline mobility status (ambulation, transfers, stairs, etc )    - Identify cognitive and physical deficits and behaviors that affect mobility  - Identify mobility aids required to assist with transfers and/or ambulation (gait belt, sit-to-stand, lift, walker, cane, etc )  - [place] fall precautions as indicated by assessment  - Record patient progress and toleration of activity level on Mobility SBAR; progress patient to next Phase/Stage  - Instruct patient to call for assistance with activity based on assessment  - Consider rehabilitation consult to assist with strengthening/weightbearing, etc   Outcome: Progressing     Problem: DISCHARGE PLANNING  Goal: Discharge to home or other facility with appropriate resources  Description: INTERVENTIONS:  - Identify barriers to discharge w/patient and caregiver  - Arrange for needed discharge resources and transportation as appropriate  - Identify discharge learning needs (meds, wound care, etc )  - Arrange for interpretive services to assist at discharge as needed  - Refer to Case Management Department for coordinating discharge planning if the patient needs post-hospital services based on physician/advanced practitioner order or complex needs related to functional status, cognitive ability, or social support system  Outcome: Progressing     Problem: Knowledge Deficit  Goal: Patient/family/caregiver demonstrates understanding of disease process, treatment plan, medications, and discharge instructions  Description: Complete learning assessment and assess knowledge base    Interventions:  - Provide teaching at level of understanding  - Provide teaching via preferred learning methods  Outcome: Progressing     Problem: NEUROSENSORY - ADULT  Goal: Achieves stable or improved neurological status  Description: INTERVENTIONS  - Monitor and report changes in neurological status  - Monitor vital signs such as temperature, blood pressure, glucose, and any other labs ordered   - Initiate measures to prevent increased intracranial pressure  - Monitor for seizure activity and implement precautions if appropriate      Outcome: Progressing  Goal: Remains free of injury related to seizures activity  Description: INTERVENTIONS  - Maintain airway, patient safety  and administer oxygen as ordered  - Monitor patient for seizure activity, document and report duration and description of seizure to physician/advanced practitioner  - If seizure occurs,  ensure patient safety during seizure  - Reorient patient post seizure  - Seizure pads on all 4 side rails  - Instruct patient/family to notify RN of any seizure activity including if an aura is experienced  - Instruct patient/family to call for assistance with activity based on nursing assessment  - Administer anti-seizure medications if ordered    Outcome: Progressing

## 2021-05-18 NOTE — ASSESSMENT & PLAN NOTE
Wt Readings from Last 3 Encounters:   05/18/21 75 8 kg (167 lb)   01/04/21 88 6 kg (195 lb 5 2 oz)   07/30/13 95 3 kg (210 lb)     Not in acute exacerbation   - Continue lasix

## 2021-05-18 NOTE — ASSESSMENT & PLAN NOTE
Wesley Uribe is a 57-year-old female with Afib not on [facility] due to prior cerebral hemorrhage (2013), neurosarcoidosis, seizures on Lamictal and Dilantin, HTN, HLD, and CHF who presented to [facility] on 5/18/2021 due to reported seizure at home  Patient anxiously yelled, "I am not okay, something is wrong," and told her son that she had a seizure," however this was not witnessed by her son  She recalls having head jerking and right UE tremor, but did not lose consciousness  No incontinence, tongue bite, or postictal confusion  · On arrival to the ED, /99  · Phenytoin level therapeutic at 19 5  Lamictal level pending  · CT head unremarkable for acute abnormalities  CTA head/neck was not performed in the ED due to iodine allergy  Given patient's return to baseline, low suspicion for stroke  Etiology unclear, but likely due to hypertensive encephalopathy  It is possible that she had a breakthrough seizure, but this is likely from the hypertension  Could also be stress reaction  Plan:  - Will defer further neuroimaging  - Lamictal level pending  - Continue home AEDs: (Son reports that the patient took both Lamictal and Dilantin prior to arrival to hospital)  · Lamictal 250 mg BID  · Dilantin 100 mg ER capsule (1 capsule Monday, Wednesday, Friday; 2 capsules Tuesday, Thursday, Saturday, Sunday)  - Administer Ativan prn seizure like activity > 2 min    - Continue telemetry  - Recommend blood pressure control, avoid hypotension  - Continue seizure precautions   - [employer] form not applicable, patient does not drive  - Medical management supportive care per primary team  - Please contact Neurology with any acute change in mental status  - If patient's blood pressure continues to be well controlled, patient is cleared for discharge from a Neuro standpoint

## 2021-05-18 NOTE — ED NOTES
[place] RN educated patient and family on that patient cannot have anything to drink at this time- without formal speech evaluated       Bridgette Albert RN  05/18/21 [id number]

## 2021-05-18 NOTE — ASSESSMENT & PLAN NOTE
66year old female with PMH of neurosarcoidosis, warfarin-associated ICH, and post- hemorrhagic epilepsy presented with seizure-like activity   - Neurology on board assisting with management  - Continue dilantin and lamictal  - Await levels

## 2021-05-18 NOTE — ED NOTES
RAMIREZ Villalobos at bedside collecting PTT/INR at this time       Emily Victoria RN  05/18/21 [id number]

## 2021-05-19 VITALS
OXYGEN SATURATION: 94 % | HEIGHT: 58 IN | WEIGHT: 167.55 LBS | BODY MASS INDEX: 35.17 KG/M2 | TEMPERATURE: 96.8 F | RESPIRATION RATE: 18 BRPM | DIASTOLIC BLOOD PRESSURE: 80 MMHG | SYSTOLIC BLOOD PRESSURE: 157 MMHG | HEART RATE: 69 BPM

## 2021-05-19 LAB
ALBUMIN SERPL BCP-MCNC: 3.7 G/DL (ref 3.5–5)
ALP SERPL-CCNC: 205 U/L (ref 46–116)
ALT SERPL W P-5'-P-CCNC: 12 U/L (ref 12–78)
ANION GAP SERPL CALCULATED.3IONS-SCNC: 8 MMOL/L (ref 4–13)
AST SERPL W P-5'-P-CCNC: 16 U/L (ref 5–45)
BASOPHILS # BLD AUTO: 0.06 THOUSANDS/ΜL (ref 0–0.1)
BASOPHILS NFR BLD AUTO: 1 % (ref 0–1)
BILIRUB SERPL-MCNC: 0.64 MG/DL (ref 0.2–1)
BUN SERPL-MCNC: 8 MG/DL (ref 5–25)
CALCIUM SERPL-MCNC: 8.7 MG/DL (ref 8.3–10.1)
CHLORIDE SERPL-SCNC: 104 MMOL/L (ref 100–108)
CO2 SERPL-SCNC: 31 MMOL/L (ref 21–32)
CREAT SERPL-MCNC: 0.96 MG/DL (ref 0.6–1.3)
EOSINOPHIL # BLD AUTO: 0.3 THOUSAND/ΜL (ref 0–0.61)
EOSINOPHIL NFR BLD AUTO: 3 % (ref 0–6)
ERYTHROCYTE [DISTWIDTH] IN BLOOD BY AUTOMATED COUNT: 15.3 % (ref 11.6–15.1)
GFR SERPL CREATININE-BSD FRML MDRD: 66 ML/MIN/1.73SQ M
GLUCOSE P FAST SERPL-MCNC: 83 MG/DL (ref 65–99)
GLUCOSE SERPL-MCNC: 83 MG/DL (ref 65–140)
HCT VFR BLD AUTO: 45.2 % (ref 34.8–46.1)
HGB BLD-MCNC: 14.5 G/DL (ref 11.5–15.4)
IMM GRANULOCYTES # BLD AUTO: 0.07 THOUSAND/UL (ref 0–0.2)
IMM GRANULOCYTES NFR BLD AUTO: 1 % (ref 0–2)
LYMPHOCYTES # BLD AUTO: 1.86 THOUSANDS/ΜL (ref 0.6–4.47)
LYMPHOCYTES NFR BLD AUTO: 18 % (ref 14–44)
MAGNESIUM SERPL-MCNC: 2.2 MG/DL (ref 1.6–2.6)
MCH RBC QN AUTO: 31 PG (ref 26.8–34.3)
MCHC RBC AUTO-ENTMCNC: 32.1 G/DL (ref 31.4–37.4)
MCV RBC AUTO: 97 FL (ref 82–98)
MONOCYTES # BLD AUTO: 0.84 THOUSAND/ΜL (ref 0.17–1.22)
MONOCYTES NFR BLD AUTO: 8 % (ref 4–12)
NEUTROPHILS # BLD AUTO: 7.08 THOUSANDS/ΜL (ref 1.85–7.62)
NEUTS SEG NFR BLD AUTO: 69 % (ref 43–75)
NRBC BLD AUTO-RTO: 0 /100 WBCS
PHOSPHATE SERPL-MCNC: 3.2 MG/DL (ref 2.3–4.1)
PLATELET # BLD AUTO: 182 THOUSANDS/UL (ref 149–390)
PMV BLD AUTO: 9.2 FL (ref 8.9–12.7)
POTASSIUM SERPL-SCNC: 3.5 MMOL/L (ref 3.5–5.3)
PROT SERPL-MCNC: 7.5 G/DL (ref 6.4–8.2)
RBC # BLD AUTO: 4.67 MILLION/UL (ref 3.81–5.12)
SODIUM SERPL-SCNC: 143 MMOL/L (ref 136–145)
WBC # BLD AUTO: 10.21 THOUSAND/UL (ref 4.31–10.16)

## 2021-05-19 PROCEDURE — 84100 ASSAY OF PHOSPHORUS: CPT | Performed by: STUDENT IN AN ORGANIZED HEALTH CARE EDUCATION/TRAINING PROGRAM

## 2021-05-19 PROCEDURE — 92610 EVALUATE SWALLOWING FUNCTION: CPT

## 2021-05-19 PROCEDURE — 85025 COMPLETE CBC W/AUTO DIFF WBC: CPT | Performed by: STUDENT IN AN ORGANIZED HEALTH CARE EDUCATION/TRAINING PROGRAM

## 2021-05-19 PROCEDURE — 99214 OFFICE O/P EST MOD 30 MIN: CPT | Performed by: NURSE PRACTITIONER

## 2021-05-19 PROCEDURE — 97167 OT EVAL HIGH COMPLEX 60 MIN: CPT

## 2021-05-19 PROCEDURE — 80053 COMPREHEN METABOLIC PANEL: CPT | Performed by: STUDENT IN AN ORGANIZED HEALTH CARE EDUCATION/TRAINING PROGRAM

## 2021-05-19 PROCEDURE — 97163 PT EVAL HIGH COMPLEX 45 MIN: CPT

## 2021-05-19 PROCEDURE — 83735 ASSAY OF MAGNESIUM: CPT | Performed by: STUDENT IN AN ORGANIZED HEALTH CARE EDUCATION/TRAINING PROGRAM

## 2021-05-19 PROCEDURE — 99217 PR OBSERVATION CARE DISCHARGE MANAGEMENT: CPT | Performed by: STUDENT IN AN ORGANIZED HEALTH CARE EDUCATION/TRAINING PROGRAM

## 2021-05-19 RX ADMIN — LAMOTRIGINE 250 MG: 25 TABLET ORAL at 09:22

## 2021-05-19 RX ADMIN — LAMOTRIGINE 250 MG: 25 TABLET ORAL at 17:22

## 2021-05-19 RX ADMIN — PHENYTOIN SODIUM 100 MG: 100 CAPSULE ORAL at 09:23

## 2021-05-19 RX ADMIN — LEVOTHYROXINE SODIUM 88 MCG: 88 TABLET ORAL at 05:12

## 2021-05-19 RX ADMIN — LORAZEPAM 0.5 MG: 1 TABLET ORAL at 09:21

## 2021-05-19 RX ADMIN — LOSARTAN POTASSIUM 100 MG: 50 TABLET, FILM COATED ORAL at 09:22

## 2021-05-19 RX ADMIN — FUROSEMIDE 40 MG: 40 TABLET ORAL at 09:22

## 2021-05-19 RX ADMIN — AMLODIPINE BESYLATE 5 MG: 5 TABLET ORAL at 09:23

## 2021-05-19 RX ADMIN — ATORVASTATIN CALCIUM 10 MG: 10 TABLET, FILM COATED ORAL at 17:22

## 2021-05-19 RX ADMIN — PREDNISONE 7.5 MG: 2.5 TABLET ORAL at 09:23

## 2021-05-19 RX ADMIN — LORATADINE 10 MG: 10 TABLET ORAL at 09:22

## 2021-05-19 RX ADMIN — FAMOTIDINE 20 MG: 20 TABLET ORAL at 05:12

## 2021-05-19 RX ADMIN — ALLOPURINOL 100 MG: 100 TABLET ORAL at 09:22

## 2021-05-19 RX ADMIN — NYSTATIN 1 APPLICATION: 100000 POWDER TOPICAL at 09:53

## 2021-05-19 NOTE — ASSESSMENT & PLAN NOTE
66year old female with PMH of neurosarcoidosis, warfarin-associated ICH, and post- hemorrhagic epilepsy presented with seizure-like activity  On further evaluation, neurology suspects that this was due to hypertensive urgency  - Outpatient PCP follow-up for further optimization of her regimen   - PT/OT recommending STR, but patient and son elects to go home instead since she just recently completed therapy

## 2021-05-19 NOTE — PROGRESS NOTES
Progress Note - Neurology   Nydia Duckworth 66 y o  female [id number]  Unit/Bed#: East 4 /E4 MS 0664 [id number]    Assessment/Plan:    * Seizure-like activity [facility])  Assessment & Plan  Nydia Duckworth is a 17-year-old female with Afib not on [facility] due to prior cerebral hemorrhage (2013), neurosarcoidosis, seizures on Lamictal and Dilantin, HTN, HLD, and CHF who presented to [facility] on 5/18/2021 due to reported seizure at home  Patient anxiously yelled, "I am not okay, something is wrong," and told her son that she had a seizure," however this was not witnessed by her son  She recalls having head jerking and right UE tremor, but did not lose consciousness  No incontinence, tongue bite, or postictal confusion  · On arrival to the ED, /99  · Phenytoin level therapeutic at 19 5  Lamictal level pending  · CT head unremarkable for acute abnormalities  CTA head/neck was not performed in the ED due to iodine allergy  Given patient's return to baseline, low suspicion for stroke  Etiology unclear, but likely due to hypertensive encephalopathy  It is possible that she had a breakthrough seizure, but this is likely from the hypertension  Could also be stress reaction  Plan:  - Will defer further neuroimaging  - Lamictal level pending  - Continue home AEDs: (Son reports that the patient took both Lamictal and Dilantin prior to arrival to hospital)  · Lamictal 250 mg BID  · Dilantin 100 mg ER capsule (1 capsule Monday, Wednesday, Friday; 2 capsules Tuesday, Thursday, Saturday, Sunday)  - Administer Ativan prn seizure like activity > 2 min    - Continue telemetry  - Recommend blood pressure control, avoid hypotension  - Continue seizure precautions   - [employer] form not applicable, patient does not drive  - Medical management supportive care per primary team  - Please contact Neurology with any acute change in mental status    - If patient's blood pressure continues to be well controlled, patient is cleared for discharge from a Neuro standpoint  Atrial fibrillation [facility])  Assessment & Plan  - Not currently on anticoagulation due to prior cerebral hemorrhage in 2013 while on warfarin    Neurosarcoidosis  Assessment & Plan  - Continue home prednisone 7 5 mg daily    Hyperlipidemia  Assessment & Plan  - Continue home Lipitor 10 mg daily    Essential hypertension  Assessment & Plan  - Normotensive goal      Patient should continue to follow with her outpatient Neurologist at [facility]  Subjective:   Patient's history: Patient has 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, requiring VPS placement  In 2013 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 (no trauma associated with the hemorrhage)  She had right-sided weakness and speech disturbances which have slightly improved over time  She continues to be on a baby aspirin  About 1 year after her ICH, she developed seizures consisting of jerking in her right face/arm and right head turning  She was started on Keppra, which caused fatigue, so she was subsequently switched to phenytoin and lamotrigine  She is on prednisone for neurosarcoidosis  On exam today, patient is lying comfortably in bed in no acute distress  She recalls having head twitching arm shaking prior to her presentation  She did not lose consciousness and was aware of everything happening around her  Has residual aphasia and right upper extremity weakness from her prior stroke  She does not think she has had any further seizure-like episodes while in the hospital   Denies any headache, visual disturbances, chest pain, shortness of breath, and abdominal pain  Past Medical History:   Diagnosis Date    Seizure ([address] )     Stroke [facility])      History reviewed  No pertinent surgical history  History reviewed  No pertinent family history  Social History     Socioeconomic History    Marital status:       Spouse name: None    Number of children: None    Years of education: None    Highest education level: None   Occupational History    None   Social Needs    Financial resource strain: None    Food insecurity     Worry: None     Inability: None    Transportation needs     Medical: None     Non-medical: None   Tobacco Use    Smoking status: Never Smoker    Smokeless tobacco: Never Used   Substance and Sexual Activity    Alcohol use: Never     Frequency: Never    Drug use: Never    Sexual activity: None   Lifestyle    Physical activity     Days per week: None     Minutes per session: None    Stress: None   Relationships    Social connections     Talks on phone: None     Gets together: None     Attends Baptism service: None     Active member of club or organization: None     Attends meetings of clubs or organizations: None     Relationship status: None    Intimate partner violence     Fear of current or ex partner: None     Emotionally abused: None     Physically abused: None     Forced sexual activity: None   Other Topics Concern    None   Social History Narrative    None     E-Cigarette/Vaping     E-Cigarette/Vaping Substances         Medications:   All current active meds have been reviewed and current meds:  Scheduled Meds:  Current Facility-Administered Medications   Medication Dose Route Frequency Provider Last Rate    allopurinol  100 mg Oral Daily Cyndy De Los Santos MD      amLODIPine  5 mg Oral Daily Cyndy De Los Santos MD      aspirin  81 mg Oral Every Other Day Cyndy De Los Santos MD      atorvastatin  10 mg Oral After Aaron Beatty MD      famotidine  20 mg Oral Early Morning Cyndy De Los Santos MD      furosemide  40 mg Oral Daily Cyndy De Los Santos MD      hydrALAZINE  5 mg Intravenous Q6H PRN Cyndy De Los Santos MD      lamoTRIgine  250 mg Oral BID Cyndy De Los Santos MD      levothyroxine  88 mcg Oral Early Morning Cyndy De Los Santos MD      loratadine  10 mg Oral Daily Cyndy De Los Santos MD      LORazepam  0 5 mg Oral Q8H PRN MD Tri Forbes losartan  100 mg Oral Daily José Velázquez MD      nystatin   Topical BID José Velázquez MD      phenytoin  100 mg Oral Once per day on Mon Wed Fri José Velázquez MD     Wash Caller Nguyễn Garcia ON 5/20/2021] phenytoin  200 mg Oral Once per day on Sun Tue Thu Sat José Velázquez MD      predniSONE  7 5 mg Oral Daily José Velázquez MD       Continuous Infusions:   PRN Meds: hydrALAZINE    LORazepam       ROS:   Review of Systems   Constitutional: Negative for fatigue  HENT: Negative for trouble swallowing and voice change  Eyes: Negative for photophobia and visual disturbance  Respiratory: Negative for chest tightness and shortness of breath  Cardiovascular: Negative for chest pain  Gastrointestinal: Negative for abdominal pain  Musculoskeletal: Positive for gait problem  Neurological: Positive for facial asymmetry, speech difficulty, weakness and numbness  Negative for dizziness, light-headedness and headaches  Vitals:   /70 (BP Location: Left arm)   Pulse 62   Temp 97 7 °F (36 5 °C) (Temporal)   Resp 18   Ht 4' 10" (1 473 m)   Wt 76 kg (167 lb 8 8 oz)   SpO2 92%   BMI 35 02 kg/m²     Physical Exam:   Physical Exam  Vitals signs and nursing note reviewed  Constitutional:       Appearance: Normal appearance  Comments: Patient lying comfortably in bed in no acute distress  HENT:      Head: Normocephalic and atraumatic  Nose: Nose normal       Mouth/Throat:      Mouth: Mucous membranes are moist       Pharynx: Oropharynx is clear  Eyes:      Extraocular Movements: Extraocular movements intact  Conjunctiva/sclera: Conjunctivae normal       Pupils: Pupils are equal, round, and reactive to light  Pulmonary:      Effort: Pulmonary effort is normal    Musculoskeletal:      Comments: Decreased strength in right upper extremity secondary to prior stroke   Skin:     General: Skin is warm and dry  Neurological:      Mental Status: She is alert        Deep Tendon Reflexes: Reflex Scores:       Bicep reflexes are 3+ on the right side and 1+ on the left side  Brachioradialis reflexes are 2+ on the right side and 1+ on the left side  Achilles reflexes are 0 on the right side and 0 on the left side  Comments: See full neuro exam below       Neurologic Exam     Mental Status   Patient awake and alert  Oriented to person  She knows that this places for [address] people" but could not come up with the [facility]   When given choices, correctly show [facility]  Stated that the month is April and the year is 2015 "  Able to follow simple commands, but difficulty with multistep and cross over commands  Expressive aphasia with general conversation, but is able to name all objects, and read sentences  Paraphasic errors when repeating short phrases  No dysarthria  Cranial Nerves     CN III, IV, VI   Pupils are equal, round, and reactive to light  Pupils 4 mm, round, reactive to light bilaterally  EOMs intact without nystagmus  Facial sensation to light touch intact throughout  Right central facial weakness  Tongue slightly deviates to the right  Palate raises symmetrically     Motor Exam   Approximately 2-3/5 right deltoid, 5/5 right biceps/triceps, 3/5 right  strength  4+/5 right iliopsoas muscle, but remainder strength in right lower extremity 5/5   5/5 strength in left UE/LE  Sensory Exam     Sensation to light touch, temperature, and vibration intact throughout  Gait, Coordination, and Reflexes     Gait  Gait: (Deferred for patient's safety)    Reflexes   Right brachioradialis: 2+  Left brachioradialis: 1+  Right biceps: 3+  Left biceps: 1+  Right patellar reflex grade: Trace  Left patellar reflex grade: Trace  Right achilles: 0  Left achilles: 0  No resting or action tremor  No ataxia with left finger-to-nose  Could not perform finger-to-nose with the right upper extremity due to weakness  No ankle clonus bilaterally  Labs:  I have personally reviewed pertinent reports     Recent Results (from the past 24 hour(s))   Phenytoin level, total    Collection Time: 05/18/21 10:15 AM   Result Value Ref Range    Phenytoin Lvl 19 5 10 0 - 20 0 ug/mL   Comprehensive metabolic panel    Collection Time: 05/18/21 10:15 AM   Result Value Ref Range    Sodium 144 136 - 145 mmol/L    Potassium 3 3 (L) 3 5 - 5 3 mmol/L    Chloride 104 100 - 108 mmol/L    CO2 32 21 - 32 mmol/L    ANION GAP 8 4 - 13 mmol/L    BUN 9 5 - 25 mg/dL    Creatinine 1 15 0 60 - 1 30 mg/dL    Glucose 160 (H) 65 - 140 mg/dL    Calcium 8 5 8 3 - 10 1 mg/dL    AST 16 5 - 45 U/L    ALT 18 12 - 78 U/L    Alkaline Phosphatase 199 (H) 46 - 116 U/L    Total Protein 7 4 6 4 - 8 2 g/dL    Albumin 3 7 3 5 - 5 0 g/dL    Total Bilirubin 0 40 0 20 - 1 00 mg/dL    eGFR 53 ml/min/1 73sq m   TSH    Collection Time: 05/18/21 10:15 AM   Result Value Ref Range    TSH 3RD GENERATON 0 039 (L) 0 358 - 3 740 uIU/mL   ECG 12 lead    Collection Time: 05/18/21 10:35 AM   Result Value Ref Range    Ventricular Rate 64 BPM    Atrial Rate 159 BPM    NM Interval  ms    QRSD Interval 144 ms    QT Interval 418 ms    QTC Interval 431 ms    P Axis  degrees    QRS Axis -50 degrees    T Wave Axis 150 degrees   Fingerstick Glucose (POCT)    Collection Time: 05/18/21 10:41 AM   Result Value Ref Range    POC Glucose 161 (H) 65 - 140 mg/dl   CBC and differential    Collection Time: 05/18/21 11:02 AM   Result Value Ref Range    WBC 9 68 4 31 - 10 16 Thousand/uL    RBC 4 41 3 81 - 5 12 Million/uL    Hemoglobin 13 6 11 5 - 15 4 g/dL    Hematocrit 42 9 34 8 - 46 1 %    MCV 97 82 - 98 fL    MCH 30 8 26 8 - 34 3 pg    MCHC 31 7 31 4 - 37 4 g/dL    RDW 15 4 (H) 11 6 - 15 1 %    MPV 9 9 8 9 - 12 7 fL    Platelets 741 084 - 932 Thousands/uL    nRBC 0 /100 WBCs    Neutrophils Relative 70 43 - 75 %    Immat GRANS % 1 0 - 2 %    Lymphocytes Relative 19 14 - 44 %    Monocytes Relative 7 4 - 12 %    Eosinophils Relative 2 0 - 6 %    Basophils Relative 1 0 - 1 %    Neutrophils Absolute 6 84 1 85 - 7 62 Thousands/µL    Immature Grans Absolute 0 08 0 00 - 0 20 Thousand/uL    Lymphocytes Absolute 1 82 0 60 - 4 47 Thousands/µL    Monocytes Absolute 0 69 0 17 - 1 22 Thousand/µL    Eosinophils Absolute 0 20 0 00 - 0 61 Thousand/µL    Basophils Absolute 0 05 0 00 - 0 10 Thousands/µL   ECG 12 lead    Collection Time: 05/18/21 11:33 AM   Result Value Ref Range    Ventricular Rate 57 BPM    Atrial Rate 340 BPM    FL Interval  ms    QRSD Interval 146 ms    QT Interval 442 ms    QTC Interval 430 ms    P Axis  degrees    QRS Axis -43 degrees    T Wave Axis 149 degrees   ECG 12 lead    Collection Time: 05/18/21  4:45 PM   Result Value Ref Range    Ventricular Rate 50 BPM    Atrial Rate 326 BPM    FL Interval  ms    QRSD Interval 146 ms    QT Interval 470 ms    QTC Interval 428 ms    P Axis  degrees    QRS Axis -44 degrees    T Wave Axis 177 degrees   CBC and differential    Collection Time: 05/19/21  4:35 AM   Result Value Ref Range    WBC 10 21 (H) 4 31 - 10 16 Thousand/uL    RBC 4 67 3 81 - 5 12 Million/uL    Hemoglobin 14 5 11 5 - 15 4 g/dL    Hematocrit 45 2 34 8 - 46 1 %    MCV 97 82 - 98 fL    MCH 31 0 26 8 - 34 3 pg    MCHC 32 1 31 4 - 37 4 g/dL    RDW 15 3 (H) 11 6 - 15 1 %    MPV 9 2 8 9 - 12 7 fL    Platelets 307 944 - 449 Thousands/uL    nRBC 0 /100 WBCs    Neutrophils Relative 69 43 - 75 %    Immat GRANS % 1 0 - 2 %    Lymphocytes Relative 18 14 - 44 %    Monocytes Relative 8 4 - 12 %    Eosinophils Relative 3 0 - 6 %    Basophils Relative 1 0 - 1 %    Neutrophils Absolute 7 08 1 85 - 7 62 Thousands/µL    Immature Grans Absolute 0 07 0 00 - 0 20 Thousand/uL    Lymphocytes Absolute 1 86 0 60 - 4 47 Thousands/µL    Monocytes Absolute 0 84 0 17 - 1 22 Thousand/µL    Eosinophils Absolute 0 30 0 00 - 0 61 Thousand/µL    Basophils Absolute 0 06 0 00 - 0 10 Thousands/µL   Comprehensive metabolic panel    Collection Time: 05/19/21  4:36 AM   Result Value Ref Range Sodium 143 136 - 145 mmol/L    Potassium 3 5 3 5 - 5 3 mmol/L    Chloride 104 100 - 108 mmol/L    CO2 31 21 - 32 mmol/L    ANION GAP 8 4 - 13 mmol/L    BUN 8 5 - 25 mg/dL    Creatinine 0 96 0 60 - 1 30 mg/dL    Glucose 83 65 - 140 mg/dL    Glucose, Fasting 83 65 - 99 mg/dL    Calcium 8 7 8 3 - 10 1 mg/dL    AST 16 5 - 45 U/L    ALT 12 12 - 78 U/L    Alkaline Phosphatase 205 (H) 46 - 116 U/L    Total Protein 7 5 6 4 - 8 2 g/dL    Albumin 3 7 3 5 - 5 0 g/dL    Total Bilirubin 0 64 0 20 - 1 00 mg/dL    eGFR 66 ml/min/1 73sq m   Magnesium    Collection Time: 05/19/21  4:36 AM   Result Value Ref Range    Magnesium 2 2 1 6 - 2 6 mg/dL   Phosphorus    Collection Time: 05/19/21  4:36 AM   Result Value Ref Range    Phosphorus 3 2 2 3 - 4 1 mg/dL       Imaging: I have personally reviewed pertinent imaging in PACS, including CT head,  and I have personally reviewed PACS reports  EKG, Pathology, and Other Studies: I have personally reviewed pertinent reports  VTE Prophylaxis: Sequential compression device (Venodyne)       Counseling / Coordination of Care  Total time spent today 28 minutes  Greater than 50% of total time was spent with the patient and/or family counseling and/or coordination of care  A description of the counseling/coordination of care:  Patient was seen and evaluated  Discussed with attending  Chart reviewed thoroughly including laboratory and imaging studies    Plan of care discussed with patient and primary team

## 2021-05-19 NOTE — PHYSICAL THERAPY NOTE
PT EVALUATION 10:14-10:35 ( 21 minutes)    66 y o     [id number]    Seizure ([facility] [address] ) [R56 9]  Seizure-like activity ([address] ) [R56 9]    Past Medical History:   Diagnosis Date    Seizure ([address] )     Stroke [facility])          History reviewed  No pertinent surgical history  05/19/21 1014   PT Last Visit   PT Visit Date 05/19/21   Note Type   Note type Evaluation   Pain Assessment   Pain Score No Pain   Home Living   Type of Home House   Home Layout Two level  (1 RIO)   Bathroom Shower/Tub Walk-in shower   Bathroom Toilet Standard   Bathroom Equipment Tub transfer bench;Commode   [address]; Wheelchair-manual  (ordered one handed walker to use for home  )   Additional Comments Hx obtained from pt-noted to be limited historian  Resides with son in 2 story home  Stays on first floor during day with BSC, negotiates steps once daily  Reports having home care PT and OT prior to admission   Prior Function   Level of [place] Independent with ADLs and functional mobility; Needs assistance with IADLs   Lives With Son   Receives Help From Family   ADL Assistance Needs assistance   IADLs Needs assistance   Falls in the last 6 months 0   Vocational Retired   Comments Ambulates with SPC in home  Use of WC in community  Restrictions/Precautions   Weight Bearing Precautions Per Order No   Other Precautions Fall Risk;Telemetry; Seizure;Cognitive; Chair Alarm; Bed Alarm  (expressive aphasia)   General   Additional Pertinent History Pt is 65 y/o female admitted with seizure like activity  PT consulted  Family/Caregiver Present No   Cognition   Overall Cognitive Status Impaired   Arousal/Participation Alert   Orientation Level Oriented to person;Oriented to place  (+ May 2021 only)   Following Commands Follows one step commands with increased time or repetition   Comments Pleasant  Aphasic  Limited historian     RUE Assessment   RUE Assessment X  (hx of ICH with R weakness  )   VISHNU Assessment   LUE Assessment WFL  (as observed with functional reach and grasp )   RLE Assessment   RLE Assessment X   Strength RLE   R Hip Flexion 3-/5   R Knee Extension 4-/5   R Ankle Dorsiflexion 2/5   R Ankle Plantar Flexion 2/5   LLE Assessment   LLE Assessment WFL  (grossly 4/5)   Coordination   Movements are Fluid and Coordinated 0   Coordination and Movement Description R sided weakness  Light Touch   RLE Light Touch Grossly intact   LLE Light Touch Grossly intact   Bed Mobility   Supine to Sit 3  Moderate assistance   Additional items Assist x 1; Increased time required;Verbal cues   Additional Comments Increased time to complete transition  Transfers   Sit to Stand 4  Minimal assistance   Additional items Assist x 2; Increased time required;Verbal cues   Stand to Sit 4  Minimal assistance   Additional items Assist x 2   Additional Comments Cues for hand placement  Increased time to complete  Ambulation/Elevation   Gait pattern Improper Weight shift;Decreased foot clearance   Gait Assistance 4  Minimal assist   Additional items Assist x 2   Assistive Device Straight cane; Other (Comment)  (cane + hand held assist )   Distance AMb wiht cane + hand held assist x 3 ft OOB to chair  Balance   Static Sitting Fair   Dynamic Sitting Fair -   Static Standing Poor +   Dynamic Standing Poor   Ambulatory Poor   Endurance Deficit   Endurance Deficit Yes   Endurance Deficit Description fatigue, weakness  Activity Tolerance   Activity Tolerance Patient tolerated treatment well;Patient limited by fatigue   Medical Staff Made Aware Nurse, Marielos Gold   Nurse Made Aware yes   Assessment   Prognosis Fair   Problem List Decreased strength;Decreased range of motion;Decreased endurance; Impaired balance;Decreased mobility; Decreased coordination;Decreased cognition; Impaired judgement;Decreased safety awareness; Impaired tone   Assessment Alicia Velasco is a 66 y o  female who presents with seizure like symptoms and hypertensive urgency  She has a past medical history of atrial fibrillation not on anticoagulation due to hx of ICH, neurosarcoidosis, seizures, CHF, HTN, hypothyroid, hyperlipidemia and epilepsy  PT consulted  Up and OOB as tolerated orders  Prior to admission reports to reside in 2 story home with son  Pt limited historian-history taken from pt at bedside  Reports ability to ambulate with SPC independently in home, use of WC for community distances  Son working on obtaining one handed walker  Denies falls  Reports negotiates stairs once daily and remains on first floor with BSC during day  Currently presents with functional limitations related to decreased R sided strength and impaired coordination, decreased balance, functional mobility and locomotion  Requires modA for bed mobility  Margarita of 2 for transfers and ambulation  Ambulation requiring hand held assist on R + use of cane on left to ambulate short distances from bed to chair  Unsteady  Decreased RLE foot clearance and step length  Risk for falls given impairments as noted above  Will benefit from skilled PT in order to optimize functional outcomes while minimizing caregiver burden  The patient's AM-PAC Basic Mobility Inpatient Short Form Raw Score is 12, Standardized Score is 32 23  A standardized score less than 42 9 suggests the patient may benefit from discharge to post-acute rehabilitation services  Please also refer to the recommendation of the Physical Therapist for safe discharge planning  May benefit from rehab at discharge if family unable to provide care at current level of function  If PLOF independent with SPC in home, clearly functioning below baseline in which continued therapy would be beneficial   PT will follow and progress  Barriers to Discharge Inaccessible home environment   Goals   Patient Goals go home   STG Expiration Date 05/29/21   Short Term Goal #1 10 days: 1)    Pt will perform bed mobility with Joselo demonstrating appropriate technique 100% of the time in order to improve function  2)  Perform all transfers with Joselo demonstrating safe and appropriate technique 100% of the time in order to improve ability to negotiate safely in home environment  3) Amb with least restrictive AD > 50'x1 with S in order to demonstrate ability to negotiate in home environment  4)  Improve overall strength and balance 1/2 grade in order to optimize ability to perform functional tasks and reduce fall risk  5) Increase activity tolerance to 30 minutes in order to improve endurance to functional tasks  6)  Negotiate stairs using most appropriate technique and Sea in order to be able to negotiate safely in home environment  7) PT for ongoing patient and family/caregiver education, DME needs and d/c planning in order to promote highest level of function in least restrictive environment  Plan   Treatment/Interventions Functional transfer training;LE strengthening/ROM; Therapeutic exercise; Endurance training;Patient/family training;Equipment eval/education; Bed mobility;Gait training; Compensatory technique education;Continued evaluation;Spoke to nursing;OT   PT Frequency Other (Comment)  (4-5x/wk)   Recommendation   PT Discharge Recommendation Post acute rehabilitation services   Equipment Recommended   (monitor)   Additional Comments If family unable to accommodate current level of function, would rec STR in order to optimize functional outcomes while minimizing caregiver burden     AM-PAC Basic Mobility Inpatient   Turning in Bed Without Bedrails 2   Lying on Back to Sitting on Edge of Flat Bed 2   Moving Bed to Chair 2   Standing Up From Chair 3   Walk in Room 2   Climb 3-5 Stairs 1   Basic Mobility Inpatient Raw Score 12   Basic Mobility Standardized Score 32 23     History: co - morbidities, fall risk, use of assistive device, assist for adl's, cognition, multiple lines,steps  Exam: impairments in locomotion, musculoskeletal, balance,posture,cardiac, coordination, communication( aphasia), cognition   Clinical: unstable/unpredictable ( fall risk, cognition, chair/bed alarm, decreased activity tolerance, ongoing medical workup)  Complexity:high    Remy Hayden, PT

## 2021-05-19 NOTE — SPEECH THERAPY NOTE
Speech Language/Pathology  Speech/Language Pathology  Assessment    Patient Name: Ulysses Shook  FWDEI'Z Date: 5/19/2021     Problem List  Principal Problem:    Seizure-like activity [facility])  Active Problems:    Essential hypertension    Hypothyroidism    Hyperlipidemia    Atrial fibrillation ([facility] [address] )    Chronic systolic heart failure ([facility] [address] )    Neurosarcoidosis    Past Medical History  Past Medical History:   Diagnosis Date    Seizure ([facility] )     Stroke [facility])      Past Surgical History  History reviewed  No pertinent surgical history  Bedside Swallow Evaluation:    Summary:  Pt presents w/ slow rate of speech/mild dysarthria but intelligible w/ intermittent word finding difficulty   Pt stated this is not normal for her  Initially was refusing to eat or drink  Lips were so dry they were sticking together  She was agreeable to a drink and tolerated wnl w/ good lip seal, control and swallow response  Phone rang, returned later w/ nurse in the room  I was abLe to get the pt to take a small bite of the muffin which she didn't like and one small bite of a peach  Frontal mastication (has only upper dentures  States the lowers broke) Otherwise wfl  Refused any more  Able to take her meds whole w/ water  All meds were small  With limited eval, oral stage appears mild  No pharyngeal s/s  Recommendations:  Diet: Dysphagia 3 dental  Liquid:thin  Meds: likes to take them "5 at a time" w/ water (pills are small)  Supervision: may need assist  Needs encouragement to take PO  Keeps stating she doesn't need to do things because she's going home soon  Positioning:Upright  Strategies: Pt to take PO/Meds only when fully alert and upright     Oral care  Aspiration precautions  Reflux precautions  Therapy Prognosis: seems favorable  Frequency: 2-5x/wk    Goal(s):  Dysphagia LTG  -Patient will demonstrate safe and effective oral intake (without overt s/s significant oral/pharyngeal dysphagia including s/s penetration or aspiration) for the highest appropriate diet level  1 Pt will tolerate least restrictive diet w/out s/s aspiration or oral/pharyngeal difficulties  2 Pt will will effectively masticate and transfer solids w/out s/s dysphagia/aspiration  3 Pt will tolerate thin liquids w/out s/s aspiration  Patient's goal: keeps stating she is going home soon "I was better at home, I didn't get sick until I came here"    Consider consult w/:  Rehab  Nutrition    Reason for consult:  R/o aspiration  Determine safest and least restrictive diet  Change in mental status  New neuro event  Stroke protocol  H/o neurological disease  Current diet:  regular  Premorbid diet[de-identified]  ? regular  Previous VBS:  None known  O2 requirement:  none  Voice/Speech:  Noted above  Social:  Lives w/ children  Follows commands:  basic                     Cognitive Status:  Alert but keeps stating she is leaving  [facility]:  Dentition:upper plate, lower edentulous  Labial strength and ROM:wfl  Lingual strength and ROM:wfl  Mandibular strength and ROM: appears reduced w/ limited sample  Secretion management:wnl    Items administered:  Noted above    Oral stage:  Lip closure:+  Mastication:min frontal  Bolus formation:wfl  Bolus control:wfl  Transfer:wfl  Oral residue:none visible  Pocketing:-    Pharyngeal stage:  Swallow promptness:+  Laryngeal rise:appears adequate  Wet voice:-  Throat clear:-  Cough:-  Secondary swallows:-  Audible swallows:-  No overt s/s aspiration    Esophageal stage:  No s/s reported    Aspiration precautions posted    Results d/w:  Pt, nursing, physician       Ct: IMPRESSION:  1  No acute intracranial CT abnormality  2   Left frontal encephalomalacia and volume loss representing sequela of remote insult/hemorrhage  3   Stable size of slitlike shunted lateral ventricles      Chief Complaint:   Seizure like symptoms  History of Present Illness:  Alicia Velasco is a 66 y o  female who presents with seizure like symptoms      She has a past medical history of atrial fibrillation not on anticoagulation due to intracranial bleed, neurosarcoidosis, seizures, and epilepsy  Patient unable to provide her history accurately thus was obtained through her son      Her son Tu Gustafson neuro rates that she was in her normal state of health  Until around [phone] this morning when the patient started complaining that something is wrong  She told her son Tu Gustafson that she believes she had a seizure  This was not witnessed  She did not lose any consciousness  There was no incontinence      In the ED, she had hypertensive urgency requiring IV hydralazine for better blood pressure control  CT head was performed which did not show any acute a acute abnormalities  CTA could not be performed because of her iodine allergies  Neurology would like her admitted for further observation due to concern for breakthrough seizure

## 2021-05-19 NOTE — NURSING NOTE
Pt discharged home via wheelchair accompanied by PCA to lobby  IV removed, all belongings taken  Discharge teaching and instruction provided, pt and son verbalized understanding of the same

## 2021-05-19 NOTE — PLAN OF CARE
Problem: OCCUPATIONAL THERAPY ADULT  Goal: Performs self-care activities at highest level of function for planned discharge setting  See evaluation for individualized goals  Description: Treatment Interventions: ADL retraining, Functional transfer training, UE strengthening/ROM, Endurance training, Patient/family training, Equipment evaluation/education, Cognitive reorientation, Compensatory technique education, Activityengagement, Energy conservation          See flowsheet documentation for full assessment, interventions and recommendations  Note: Limitation: Decreased ADL status, Decreased UE strength, Decreased Safe judgement during ADL, Decreased cognition, Decreased endurance, Decreased self-care trans, Decreased high-level ADLs  Prognosis: Good  Assessment: Pt is a 66 y o  female seen for OT evaluation s/p admit to [facility] on 5/18/2021 w/ Seizure-like activity ([facility] [address] )  Comorbidities affecting pt's functional performance at time of assessment include: atrial fibrillation, neurosarcoidosis, epilepsy, h/o stroke w/ right residual weakness, hypothyroidism, hyperlipidemia, atrial fibrillation, CHF h/o chronic meningitis w/  shunt, 2013 w/ left frontal intracranial hemorrhage  CT head: No acute intracranial CT abnormality  Personal factors affecting pt at time of IE include: dysarthria and expressive aphasia  Prior to admission, pt was living w/ son and reports independent w/ UB ADLs, assist w/ LB ADLs, supervision functional transfers and mobility w/ [facility] and is to get one handed walker, assist w/ IADLs   Upon evaluation: Pt requires MOD assist x1 supine>sit bed mobility w/ increased time to complete, MIN assist x2 sit<>stand w/ VCs for safety, MIN assist x2 functional mobility w/ SPC and impaired balance, MOD assist UB ADLs, MOd assist LB ADLs 2* the following deficits impacting occupational performance: decreased strength and endurance, impaired balance, dysarthria and expressive aphasia, fall risk, impaired insight into deficits, R sided residual weakness from CVA (2+/5 , PROM WFL, reports does self-assisted exercises), increased time for coordination tasks  Pt to benefit from continued skilled OT tx while in the hospital to address deficits as defined above and maximize level of functional independence w ADL's and functional mobility  Occupational Performance areas to address include: grooming, bathing/shower, toilet hygiene, dressing, health maintenance, functional mobility, clothing management, cleaning and meal prep, safety education, dynamic balance  From OT standpoint, recommendation at time of d/c would be short term rehab  The patient's raw score on the AM-PAC Daily Activity inpatient short form is 15, standardized score is 34 69, less than 39 4  Patients at this level are likely to benefit from discharge to post-acute rehabilitation services  Please refer to the recommendation of the Occupational Therapist for safe discharge planning       OT Discharge Recommendation: Post acute rehabilitation services  OT - OK to Discharge: (to rehab at this time)

## 2021-05-19 NOTE — PLAN OF CARE
Problem: Potential for Falls  Goal: Patient will remain free of falls  Description: INTERVENTIONS:  - Assess patient frequently for physical needs  -  Identify cognitive and physical deficits and behaviors that affect risk of falls    -  [place] fall precautions as indicated by assessment   - Educate patient/family on patient safety including physical limitations  - Instruct patient to call for assistance with activity based on assessment  - Modify environment to reduce risk of injury  - Consider OT/PT consult to assist with strengthening/mobility  Outcome: Progressing     Problem: Prexisting or High Potential for Compromised Skin Integrity  Goal: Skin integrity is maintained or improved  Description: INTERVENTIONS:  - Identify patients at risk for skin breakdown  - Assess and monitor skin integrity  - Assess and monitor nutrition and hydration status  - Monitor labs   - Assess for incontinence   - Turn and reposition patient  - Assist with mobility/ambulation  - Relieve pressure over bony prominences  - Avoid friction and shearing  - Provide appropriate hygiene as needed including keeping skin clean and dry  - Evaluate need for skin moisturizer/barrier cream  - Collaborate with interdisciplinary team   - Patient/family teaching  - Consider wound care consult   Outcome: Progressing     Problem: PAIN - ADULT  Goal: Verbalizes/displays adequate comfort level or baseline comfort level  Description: Interventions:  - Encourage patient to monitor pain and request assistance  - Assess pain using appropriate pain scale  - Administer analgesics based on type and severity of pain and evaluate response  - Implement non-pharmacological measures as appropriate and evaluate response  - Consider cultural and social influences on pain and pain management  - Notify physician/advanced practitioner if interventions unsuccessful or patient reports new pain  Outcome: Progressing     Problem: INFECTION - ADULT  Goal: Absence or prevention of progression during hospitalization  Description: INTERVENTIONS:  - Assess and monitor for signs and symptoms of infection  - Monitor lab/diagnostic results  - Monitor all insertion sites, i e  indwelling lines, tubes, and drains  - Monitor endotracheal if appropriate and nasal secretions for changes in amount and color  - [place] appropriate cooling/warming therapies per order  - Administer medications as ordered  - Instruct and encourage patient and family to use good hand hygiene technique  - Identify and instruct in appropriate isolation precautions for identified infection/condition  Outcome: Progressing     Problem: SAFETY ADULT  Goal: Patient will remain free of falls  Description: INTERVENTIONS:  - Assess patient frequently for physical needs  -  Identify cognitive and physical deficits and behaviors that affect risk of falls    -  [place] fall precautions as indicated by assessment   - Educate patient/family on patient safety including physical limitations  - Instruct patient to call for assistance with activity based on assessment  - Modify environment to reduce risk of injury  - Consider OT/PT consult to assist with strengthening/mobility  Outcome: Progressing  Goal: Maintain or return to baseline ADL function  Description: INTERVENTIONS:  -  Assess patient's ability to carry out ADLs; assess patient's baseline for ADL function and identify physical deficits which impact ability to perform ADLs (bathing, care of mouth/teeth, toileting, grooming, dressing, etc )  - Assess/evaluate cause of self-care deficits   - Assess range of motion  - Assess patient's mobility; develop plan if impaired  - Assess patient's need for assistive devices and provide as appropriate  - Encourage maximum independence but intervene and supervise when necessary  - Involve family in performance of ADLs  - Assess for home care needs following discharge   - Consider OT consult to assist with ADL evaluation and planning for discharge  - Provide patient education as appropriate  Outcome: Progressing  Goal: Maintain or return mobility status to optimal level  Description: INTERVENTIONS:  - Assess patient's baseline mobility status (ambulation, transfers, stairs, etc )    - Identify cognitive and physical deficits and behaviors that affect mobility  - Identify mobility aids required to assist with transfers and/or ambulation (gait belt, sit-to-stand, lift, walker, cane, etc )  - [place] fall precautions as indicated by assessment  - Record patient progress and toleration of activity level on Mobility SBAR; progress patient to next Phase/Stage  - Instruct patient to call for assistance with activity based on assessment  - Consider rehabilitation consult to assist with strengthening/weightbearing, etc   Outcome: Progressing     Problem: DISCHARGE PLANNING  Goal: Discharge to home or other facility with appropriate resources  Description: INTERVENTIONS:  - Identify barriers to discharge w/patient and caregiver  - Arrange for needed discharge resources and transportation as appropriate  - Identify discharge learning needs (meds, wound care, etc )  - Arrange for interpretive services to assist at discharge as needed  - Refer to Case Management Department for coordinating discharge planning if the patient needs post-hospital services based on physician/advanced practitioner order or complex needs related to functional status, cognitive ability, or social support system  Outcome: Progressing     Problem: Knowledge Deficit  Goal: Patient/family/caregiver demonstrates understanding of disease process, treatment plan, medications, and discharge instructions  Description: Complete learning assessment and assess knowledge base    Interventions:  - Provide teaching at level of understanding  - Provide teaching via preferred learning methods  Outcome: Progressing     Problem: NEUROSENSORY - ADULT  Goal: Achieves stable or improved neurological status  Description: INTERVENTIONS  - Monitor and report changes in neurological status  - Monitor vital signs such as temperature, blood pressure, glucose, and any other labs ordered   - Initiate measures to prevent increased intracranial pressure  - Monitor for seizure activity and implement precautions if appropriate      Outcome: Progressing  Goal: Remains free of injury related to seizures activity  Description: INTERVENTIONS  - Maintain airway, patient safety  and administer oxygen as ordered  - Monitor patient for seizure activity, document and report duration and description of seizure to physician/advanced practitioner  - If seizure occurs,  ensure patient safety during seizure  - Reorient patient post seizure  - Seizure pads on all 4 side rails  - Instruct patient/family to notify RN of any seizure activity including if an aura is experienced  - Instruct patient/family to call for assistance with activity based on nursing assessment  - Administer anti-seizure medications if ordered    Outcome: Progressing

## 2021-05-19 NOTE — PLAN OF CARE
Problem: Potential for Falls  Goal: Patient will remain free of falls  Description: INTERVENTIONS:  - Assess patient frequently for physical needs  -  Identify cognitive and physical deficits and behaviors that affect risk of falls    -  [place] fall precautions as indicated by assessment   - Educate patient/family on patient safety including physical limitations  - Instruct patient to call for assistance with activity based on assessment  - Modify environment to reduce risk of injury  - Consider OT/PT consult to assist with strengthening/mobility  Outcome: Progressing     Problem: Prexisting or High Potential for Compromised Skin Integrity  Goal: Skin integrity is maintained or improved  Description: INTERVENTIONS:  - Identify patients at risk for skin breakdown  - Assess and monitor skin integrity  - Assess and monitor nutrition and hydration status  - Monitor labs   - Assess for incontinence   - Turn and reposition patient  - Assist with mobility/ambulation  - Relieve pressure over bony prominences  - Avoid friction and shearing  - Provide appropriate hygiene as needed including keeping skin clean and dry  - Evaluate need for skin moisturizer/barrier cream  - Collaborate with interdisciplinary team   - Patient/family teaching  - Consider wound care consult   Outcome: Progressing     Problem: PAIN - ADULT  Goal: Verbalizes/displays adequate comfort level or baseline comfort level  Description: Interventions:  - Encourage patient to monitor pain and request assistance  - Assess pain using appropriate pain scale  - Administer analgesics based on type and severity of pain and evaluate response  - Implement non-pharmacological measures as appropriate and evaluate response  - Consider cultural and social influences on pain and pain management  - Notify physician/advanced practitioner if interventions unsuccessful or patient reports new pain  Outcome: Progressing     Problem: INFECTION - ADULT  Goal: Absence or prevention of progression during hospitalization  Description: INTERVENTIONS:  - Assess and monitor for signs and symptoms of infection  - Monitor lab/diagnostic results  - Monitor all insertion sites, i e  indwelling lines, tubes, and drains  - Monitor endotracheal if appropriate and nasal secretions for changes in amount and color  - [place] appropriate cooling/warming therapies per order  - Administer medications as ordered  - Instruct and encourage patient and family to use good hand hygiene technique  - Identify and instruct in appropriate isolation precautions for identified infection/condition  Outcome: Progressing     Problem: SAFETY ADULT  Goal: Patient will remain free of falls  Description: INTERVENTIONS:  - Assess patient frequently for physical needs  -  Identify cognitive and physical deficits and behaviors that affect risk of falls    -  [place] fall precautions as indicated by assessment   - Educate patient/family on patient safety including physical limitations  - Instruct patient to call for assistance with activity based on assessment  - Modify environment to reduce risk of injury  - Consider OT/PT consult to assist with strengthening/mobility  Outcome: Progressing  Goal: Maintain or return to baseline ADL function  Description: INTERVENTIONS:  -  Assess patient's ability to carry out ADLs; assess patient's baseline for ADL function and identify physical deficits which impact ability to perform ADLs (bathing, care of mouth/teeth, toileting, grooming, dressing, etc )  - Assess/evaluate cause of self-care deficits   - Assess range of motion  - Assess patient's mobility; develop plan if impaired  - Assess patient's need for assistive devices and provide as appropriate  - Encourage maximum independence but intervene and supervise when necessary  - Involve family in performance of ADLs  - Assess for home care needs following discharge   - Consider OT consult to assist with ADL evaluation and planning for discharge  - Provide patient education as appropriate  Outcome: Progressing  Goal: Maintain or return mobility status to optimal level  Description: INTERVENTIONS:  - Assess patient's baseline mobility status (ambulation, transfers, stairs, etc )    - Identify cognitive and physical deficits and behaviors that affect mobility  - Identify mobility aids required to assist with transfers and/or ambulation (gait belt, sit-to-stand, lift, walker, cane, etc )  - [place] fall precautions as indicated by assessment  - Record patient progress and toleration of activity level on Mobility SBAR; progress patient to next Phase/Stage  - Instruct patient to call for assistance with activity based on assessment  - Consider rehabilitation consult to assist with strengthening/weightbearing, etc   Outcome: Progressing     Problem: DISCHARGE PLANNING  Goal: Discharge to home or other facility with appropriate resources  Description: INTERVENTIONS:  - Identify barriers to discharge w/patient and caregiver  - Arrange for needed discharge resources and transportation as appropriate  - Identify discharge learning needs (meds, wound care, etc )  - Arrange for interpretive services to assist at discharge as needed  - Refer to Case Management Department for coordinating discharge planning if the patient needs post-hospital services based on physician/advanced practitioner order or complex needs related to functional status, cognitive ability, or social support system  Outcome: Progressing     Problem: Knowledge Deficit  Goal: Patient/family/caregiver demonstrates understanding of disease process, treatment plan, medications, and discharge instructions  Description: Complete learning assessment and assess knowledge base    Interventions:  - Provide teaching at level of understanding  - Provide teaching via preferred learning methods  Outcome: Progressing     Problem: NEUROSENSORY - ADULT  Goal: Achieves stable or improved neurological status  Description: INTERVENTIONS  - Monitor and report changes in neurological status  - Monitor vital signs such as temperature, blood pressure, glucose, and any other labs ordered   - Initiate measures to prevent increased intracranial pressure  - Monitor for seizure activity and implement precautions if appropriate      Outcome: Progressing  Goal: Remains free of injury related to seizures activity  Description: INTERVENTIONS  - Maintain airway, patient safety  and administer oxygen as ordered  - Monitor patient for seizure activity, document and report duration and description of seizure to physician/advanced practitioner  - If seizure occurs,  ensure patient safety during seizure  - Reorient patient post seizure  - Seizure pads on all 4 side rails  - Instruct patient/family to notify RN of any seizure activity including if an aura is experienced  - Instruct patient/family to call for assistance with activity based on nursing assessment  - Administer anti-seizure medications if ordered    Outcome: Progressing

## 2021-05-19 NOTE — PLAN OF CARE
Problem: PHYSICAL THERAPY ADULT  Goal: Performs mobility at highest level of function for planned discharge setting  See evaluation for individualized goals  Description: Treatment/Interventions: Functional transfer training, LE strengthening/ROM, Therapeutic exercise, Endurance training, Patient/family training, Equipment eval/education, Bed mobility, Gait training, Compensatory technique education, Continued evaluation, Spoke to nursing, OT  Equipment Recommended: (monitor)       See flowsheet documentation for full assessment, interventions and recommendations  Note: Prognosis: Fair  Problem List: Decreased strength, Decreased range of motion, Decreased endurance, Impaired balance, Decreased mobility, Decreased coordination, Decreased cognition, Impaired judgement, Decreased safety awareness, Impaired tone  Assessment: July Collins is a 66 y o  female who presents with seizure like symptoms and hypertensive urgency  She has a past medical history of atrial fibrillation not on anticoagulation due to hx of ICH, neurosarcoidosis, seizures, CHF, HTN, hypothyroid, hyperlipidemia and epilepsy  PT consulted  Up and OOB as tolerated orders  Prior to admission reports to reside in 2 story home with son  Pt limited historian-history taken from pt at bedside  Reports ability to ambulate with SPC independently in home, use of WC for community distances  Son working on obtaining one handed walker  Denies falls  Reports negotiates stairs once daily and remains on first floor with BSC during day  Currently presents with functional limitations related to decreased R sided strength and impaired coordination, decreased balance, functional mobility and locomotion  Requires modA for bed mobility  [place] of 2 for transfers and ambulation  Ambulation requiring hand held assist on R + use of cane on left to ambulate short distances from bed to chair  Unsteady  Decreased RLE foot clearance and step length   Risk for falls given impairments as noted above  Will benefit from skilled PT in order to optimize functional outcomes while minimizing caregiver burden  The patient's AM-PAC Basic Mobility Inpatient Short Form Raw Score is 12, Standardized Score is 32 23  A standardized score less than 42 9 suggests the patient may benefit from discharge to post-acute rehabilitation services  Please also refer to the recommendation of the Physical Therapist for safe discharge planning  May benefit from rehab at discharge if family unable to provide care at current level of function  If PLOF independent with SPC in home, clearly functioning below baseline in which continued therapy would be beneficial   PT will follow and progress  Barriers to Discharge: Inaccessible home environment        PT Discharge Recommendation: Post acute rehabilitation services          See flowsheet documentation for full assessment

## 2021-05-19 NOTE — ASSESSMENT & PLAN NOTE
Wt Readings from Last 3 Encounters:   05/19/21 76 kg (167 lb 8 8 oz)   01/04/21 88 6 kg (195 lb 5 2 oz)   07/30/13 95 3 kg (210 lb)     Not in acute exacerbation   - Continue lasix

## 2021-05-19 NOTE — ASSESSMENT & PLAN NOTE
Continue decreased levothyroxine dose on discharge  Previously was on 100mcg daily  Repeat TSH in 4-6 weeks

## 2021-05-19 NOTE — OCCUPATIONAL THERAPY NOTE
Occupational Therapy Evaluation     Patient Name: Reyna ARORA Date: 5/19/2021  Problem List  Principal Problem:    Seizure-like activity [facility])  Active Problems:    Essential hypertension    Hypothyroidism    Hyperlipidemia    Atrial fibrillation ([facility] [address] )    Chronic systolic heart failure ([facility] [address] )    Neurosarcoidosis    Past Medical History  Past Medical History:   Diagnosis Date    Seizure ([facility] [address] )     Stroke [facility])      Past Surgical History  History reviewed  No pertinent surgical history  05/19/21 1037   OT Last Visit   OT Visit Date 05/19/21   Note Type   Note type Evaluation   Restrictions/Precautions   Weight Bearing Precautions Per Order No   Other Precautions Chair Alarm; Bed Alarm;Cognitive;Seizure; Fall Risk;Telemetry;Hard of hearing  (Right residual weakness)   Pain Assessment   Pain Assessment Tool Pain Assessment not indicated - pt denies pain   Pain Score No Pain   Home Living   Type of Home House   Home Layout Two level  (1 RIO, flight to bed/bath, [facility] 1st floor)   Bathroom Shower/Tub Walk-in shower   Bathroom Toilet Standard   Bathroom Equipment Tub transfer bench;Commode   [address]; Jose Bach  (reports has one handed walker to use)   Additional Comments pt poor historian, reports stays on first floor of home during the day w/ BSC and goes upstairs at night to sleep, reports receiving therapy in the home; pt reports alone at times when son runs to the store   Prior Function   Level of [place] Independent with ADLs and functional mobility; Needs assistance with IADLs; Needs assistance with ADLs and functional mobility   Lives With Son   Receives Help From Family   ADL Assistance Needs assistance  (assist w/ LB ADLS)   IADLs Needs assistance   Falls in the last 6 months 0   Vocational Retired   Comments pt reports use of SPC in home and w/c in community, reports son manages her medications and does the cooking and cleaning Lifestyle   Autonomy per pt independent w/ UB ADLs and grooming, assist w/ IADLs, assist w/ LB ADLs, supervision functional mobility w/ SPO per pt   Reciprocal Relationships son, timothy   Service to Others retired    Intrinsic Gratification paint and write (types)   Subjective   Subjective "I am okay"   ADL   Where Assessed Chair   Eating Assistance 4  Minimal Assistance   Grooming Assistance 4  Minimal Assistance   UB Bathing Assistance 4  Minimal Assistance   LB [address] 3  Moderate Assistance   [address] 3  Moderate Assistance    [facility] 3  Moderate [address]  3  Moderate Assistance   Bed Mobility   Supine to Sit 3  Moderate assistance   Additional items Assist x 1; Increased time required;Verbal cues;LE management; Bedrails   Additional Comments increased time to complete   Transfers   Sit to Stand 4  Minimal assistance   Additional items Assist x 2; Increased time required;Verbal cues; Bedrails   Stand to Sit 4  Minimal assistance   Additional items Assist x 2; Increased time required;Verbal cues;Armrests   Additional Comments cues for safety and positioning   Functional Mobility   Functional Mobility 4  Minimal assistance   Additional Comments assist x2 w/ increased time and impaired balance   Additional items SPC   Balance   Static Sitting Fair   Dynamic Sitting Fair -   Static Standing Poor +   Dynamic Standing Poor   Ambulatory Poor   Activity Tolerance   Activity Tolerance Patient limited by fatigue   Nurse Made Aware appropriate to see per Kirsten PRADO   RUE Assessment   RUE Assessment   (h/o Right residual weakness)   RUE Overall AROM   R Shoulder Flexion 30   R Shoulder ABduction 30   R Elbow Flexion 60   R Elbow Supination 30   RUE Overall PROM   R Shoulder Flexion WFL   R Shoulder ABduction WFL   R Shoulder ADduction WFL   R Elbow Flexion WFL   R Elbow Extension WFL   LUE Assessment   LUE Assessment WFL  (3+/5)   Hand Function   Gross Motor Coordination   (limited R UE, WFL L UE)   Fine Motor Coordination Functional  (L UE w/ increased time, unable R UE)   Sensation   Light Touch Partial deficits in the RUE   Proprioception   Proprioception Partial deficits in the RUE   Vision-Basic Assessment   Current Vision Wears glasses all the time   Vision - Complex Assessment   Ocular Range of Motion WFL   Head Position WDL   Tracking   (increased time to complete)   Acuity Able to read clock/calendar on wall without difficulty   Cognition   Overall Cognitive Status Impaired   Arousal/Participation Responsive; Cooperative   Attention Attends with cues to redirect   Orientation Level Oriented to person;Oriented to place;Oriented to time  (oriented to month and year not specific date)   Memory Decreased recall of precautions;Decreased short term memory;Decreased recall of recent events   Following Commands Follows one step commands with increased time or repetition   Comments pt poor historian w/ dysarthria and expressive aphasia from previous stroke, pleasant and cooperative   Assessment   Limitation Decreased ADL status; Decreased UE strength;Decreased Safe judgement during ADL;Decreased cognition;Decreased endurance;Decreased self-care trans;Decreased high-level ADLs   Prognosis Good   Assessment Pt is a 66 y o  female seen for OT evaluation s/p admit to [facility] on 5/18/2021 w/ Seizure-like activity ([facility] [address] )  Comorbidities affecting pt's functional performance at time of assessment include: atrial fibrillation, neurosarcoidosis, epilepsy, h/o stroke w/ right residual weakness, hypothyroidism, hyperlipidemia, atrial fibrillation, CHF h/o chronic meningitis w/  shunt, 2013 w/ left frontal intracranial hemorrhage  CT head: No acute intracranial CT abnormality  Personal factors affecting pt at time of IE include: dysarthria and expressive aphasia   Prior to admission, pt was living w/ son and reports independent w/ UB ADLs, assist w/ LB ADLs, supervision functional transfers and mobility w/ [facility] and is to get one handed walker, assist w/ IADLs  Upon evaluation: Pt requires MOD assist x1 supine>sit bed mobility w/ increased time to complete, MIN assist x2 sit<>stand w/ VCs for safety, MIN assist x2 functional mobility w/ SPC and impaired balance, MOD assist UB ADLs, MOd assist LB ADLs 2* the following deficits impacting occupational performance: decreased strength and endurance, impaired balance, dysarthria and expressive aphasia, fall risk, impaired insight into deficits, R sided residual weakness from CVA (2+/5 , PROM WFL, reports does self-assisted exercises), increased time for coordination tasks  Pt to benefit from continued skilled OT tx while in the hospital to address deficits as defined above and maximize level of functional independence w ADL's and functional mobility  Occupational Performance areas to address include: grooming, bathing/shower, toilet hygiene, dressing, health maintenance, functional mobility, clothing management, cleaning and meal prep, safety education, dynamic balance  From OT standpoint, recommendation at time of d/c would be short term rehab  The patient's raw score on the AM-PAC Daily Activity inpatient short form is 15, standardized score is 34 69, less than 39 4  Patients at this level are likely to benefit from discharge to post-acute rehabilitation services  Please refer to the recommendation of the Occupational Therapist for safe discharge planning  Goals   Patient Goals "go home"   LTG Time Frame 10-14   Long Term Goal please see below goals   Plan   Treatment Interventions ADL retraining;Functional transfer training;UE strengthening/ROM; Endurance training;Patient/family training;Equipment evaluation/education;Cognitive reorientation; Compensatory technique education; Activityengagement; Energy conservation   Goal Expiration Date 06/02/21   OT Frequency 3-5x/wk   Recommendation   OT Discharge Recommendation Post acute rehabilitation services   OT - OK to Discharge   (to rehab at this time)   AM-PAC Daily Activity Inpatient   Lower Body Dressing 2   Bathing 2   Toileting 2   Upper Body Dressing 3   Grooming 3   Eating 3   Daily Activity Raw Score 15   Daily Activity Standardized Score (Calc for Raw Score >=11) 34 69   AM-PAC Applied Cognition Inpatient   Following a Speech/Presentation 3   Understanding Ordinary Conversation 3   Taking Medications 1   Remembering Where Things Are Placed or Put Away 1   Remembering List of 4-5 Errands 2   Taking Care of Complicated Tasks 1   Applied Cognition Raw Score 11   Applied Cognition Standardized Score 27 03   Modified [place] Scale   Modified [place] Scale 4     Occupational Therapy Goals to be met in 10-14 days:  1) Pt will improve activity tolerance to G for 30 min txment sessions to enhance ADLs  2) Pt will complete UB ADLs/self care w/ setup and min assist LB ADLs  3) Pt will complete toileting w/ supervision w/ G hygiene/thoroughness using DME PRN  4) Pt will improve functional transfers on/off all surfaces using DME PRN w/ G balance/safety including toileting w/ supervision  5) Pt will improve fx'l mobility during I/ADl/leisure tasks using DME PRN w/ g balance/safety w/ supervision  6) Pt will engage in ongoing cognitive assessment w/ G participation to A w/ safe d/c planning/recommendations  7) Pt will demonstrate G carryover of pt/caregiver education and training as appropriate w/ mod I  w/ G tolerance  8) Pt will engage in depression screen/leisure interest checklist w/ G participation to monitor s/s depression and ID 3 positive coping strategies to A w/ emotional regulation and management  9) Pt will demonstrate 100% carryover of E C  techniques w/ mod I t/o fx'l I/ADL/leisure tasks w/o cues s/p skilled education  10) Pt will demonstrate improved bed mobility to supervision to enhance ADLs  11) Pt will engage in activity configuration activity w/ G participation and mod I to increase time management skills and improve participation in a structured routine to improve overall quality of life  12) Pt will demonstrate improved standing tolerance to 3-5 minutes during functional tasks w/ Fair + dynamic standing balance to enhance ADL performance  13) Pt will demonstrate improved L UE strength by 1 MMT grade and demonstrate self-assisted ROM for R UE at MOD I to enhance ADLS and functional transfers      Documentation completed by: Taty Ruby MS, OTR/L

## 2021-05-19 NOTE — UTILIZATION REVIEW
Initial Clinical Review    Admission: Date/Time/Statement:   Admission Orders (From admission, onward)     Ordered        05/18/21 1336  Place in Observation  Once                   Orders Placed This Encounter   Procedures    Place in Observation     Standing Status:   Standing     Number of Occurrences:   1     Order Specific Question:   Level of Care     Answer:   Med Surg [16]     ED Arrival Information     Expected Arrival Acuity Means of Arrival Escorted By Service Admission Type    - 5/18/2021 10:24 [facility] Lalitha B [address] Complaint    Seizure        Chief Complaint   Patient presents with    Seizure - Prior Hx Of     pt transported via EMS from home; patient's son reports seizure like activity at home; patient has a history of seizures        Initial Presentation:  65 yo female with Hx of A Fib,  not on anticoagulation due to intracranial bleed, neurosarcoidosis, seizures  presents to ED from home with seizure like symptoms  Son reports she was in her normal state of health until around 930-940 this morning when she  started complaining that something is wrong  She told her son Roxy Skiff that she believes she had a seizure  This was not witnessed  She did not lose any consciousness  There was no incontinence or tongue biting  ED Findings:  Dysarthria, facial droop, right-sided weakness at baseline  K 3 3,  Glu 160,  Alk phos 199  CT Head w/o acute findings  CTA not preformed due to iodine allergy    Admitted 5/18 to Observation due to Seizure like activity and plan is for Telemetry, Neuro consult,  Prn ativan, Seizure precautions, Continue dilantin and lamictal, Await levels  Neuro saw pt and Given return to baseline, more concern for breakthrough seizure than stroke at this time  Day 2: Per Neuro: Phenytoin level therapeutic at 19 5  Lamictal level pending  She recalls having head twitching arm shaking prior to her presentation  She did not lose consciousness and was aware of everything happening around her  Has residual aphasia and right upper extremity weakness from her prior stroke   Continue home AEDs  If patient's blood pressure continues to be well controlled, patient is cleared for discharge from a Neuro standpoint    ED Triage Vitals   Temperature Pulse Respirations Blood Pressure SpO2   05/18/21 1339 05/18/21 1026 05/18/21 1026 05/18/21 1027 05/18/21 1026   97 8 °F (36 6 °C) 76 20 (!) 208/99 97 %      Temp Source Heart Rate Source Patient Position - Orthostatic VS BP Location FiO2 (%)   05/18/21 1339 05/18/21 1026 05/18/21 1221 05/18/21 1221 --   Oral Monitor Lying Left arm       Pain Score       05/18/21 1527       No Pain          Wt Readings from Last 1 Encounters:   05/19/21 76 kg (167 lb 8 8 oz)     Additional Vital Signs:   05/19/21 1134  96 8 °F (36 °C)   62  18  155/89  101  92 %  None (Room air) Lying   05/19/21 0736  97 7 °F (36 5 °C)  62  18  142/70  --  92 %  None (Room air) Lying   05/19/21 0317  97 5 °F (36 4 °C)  60  18  140/68  --  95 %  None (Room air) Lying   05/19/21 0042  97 1 °F (36 2 °C)   66  18  140/68  --  98 %  None (Room air) Lying   05/18/21 1858  --  55  --  178/82Abnormal   --  96 %  None (Room air) Lying   05/18/21 1553  --  --  --  160/100  --  --  -- --   05/18/21 1535  97 °F (36 1 °C)   59  18  --  --  98 %  None (Room air) Lying   05/18/21 1430  --  54Abnormal   18  184/81Abnormal   116  97 %  -- --   05/18/21 1421  --  --  --  184/81Abnormal   --  --  -- Lying   05/18/21 1400  --  54Abnormal   19  189/85Abnormal   122  97 %  None (Room air) Lying   05/18/21 1339  97 8 °F (36 6 °C)  49Abnormal   19  191/79Abnormal   --  98 %  None (Room air) Lying   05/18/21 1330  --  50Abnormal   20  194/88Abnormal   126  98 %  None (Room air) --   05/18/21 1315  --  56  20  198/89Abnormal   128  97 %  None (Room air) Lying   05/18/21 1245  --  54Abnormal   20  176/75Abnormal   108  95 %  None (Room air) Lying 05/18/21 1221  --  63  20  191/83Abnormal   --  98 %  None (Room air) Lying   05/18/21 1219  --  --  --  228/94Abnormal   --  --  -- --   05/18/21 1130  --  58  22  --  --  95 %  -- --   05/18/21 1124  --  57  19  215/84Abnormal   --  98 %  None (Room air)        Pertinent Labs/Diagnostic Test Results:   Results from last 7 days   Lab Units 05/19/21  0435 05/18/21  1102   WBC Thousand/uL 10 21* 9 68   HEMOGLOBIN g/dL 14 5 13 6   HEMATOCRIT % 45 2 42 9   PLATELETS Thousands/uL 182 173   NEUTROS ABS Thousands/µL 7 08 6 84     Results from last 7 days   Lab Units 05/19/21  0436 05/18/21  1015   SODIUM mmol/L 143 144   POTASSIUM mmol/L 3 5 3 3*   CHLORIDE mmol/L 104 104   CO2 mmol/L 31 32   ANION GAP mmol/L 8 8   BUN mg/dL 8 9   CREATININE mg/dL 0 96 1 15   EGFR ml/min/1 73sq m 66 53   CALCIUM mg/dL 8 7 8 5   MAGNESIUM mg/dL 2 2  --    PHOSPHORUS mg/dL 3 2  --      Results from last 7 days   Lab Units 05/19/21  0436 05/18/21  1015   AST U/L 16 16   ALT U/L 12 18   ALK PHOS U/L 205* 199*   TOTAL PROTEIN g/dL 7 5 7 4   ALBUMIN g/dL 3 7 3 7   TOTAL BILIRUBIN mg/dL 0 64 0 40     Results from last 7 days   Lab Units 05/18/21  1041   POC GLUCOSE mg/dl 161*     Results from last 7 days   Lab Units 05/19/21  0436 05/18/21  1015   GLUCOSE RANDOM mg/dL 83 160*     Results from last 7 days   Lab Units 05/18/21  1015   TSH 3RD GENERATON uIU/mL 0 039*     5/18 EKG: Interpretation: normal      ECG rate assessment: bradycardic      Rhythm: sinus rhythm      Ectopy: none      QRS axis:  Normal    Conduction: normal      ST segments:  Normal    T waves: normal    5/18 CT Head: 1   No acute intracranial CT abnormality  2   Left frontal encephalomalacia and volume loss representing sequela of remote insult/hemorrhage  3   Stable size of slitlike shunted lateral ventricles       ED Treatment:   Medication Administration from 05/18/2021 1024 to 05/18/2021 1524       Date/Time Order Dose Route Action     05/18/2021 1219 hydrALAZINE (APRESOLINE) injection 5 mg 5 mg Intravenous Given        Past Medical History:   Diagnosis Date    [facility] ([facility] )     [facility]      Present on Admission:   [facility] )      Admitting Diagnosis: Seizure ([facility] ) [R56 9]  Seizure-like activity ([facility] ) [R56 9]  Age/Sex: 66 y o  female     Admission Orders:  Scheduled Medications:  allopurinol, 100 mg, Oral, Daily  amLODIPine, 5 mg, Oral, Daily  aspirin, 81 mg, Oral, Every Other Day  atorvastatin, 10 mg, Oral, After Dinner  famotidine, 20 mg, Oral, Early Morning  furosemide, 40 mg, Oral, Daily  lamoTRIgine, 250 mg, Oral, BID  levothyroxine, 88 mcg, Oral, Early Morning  loratadine, 10 mg, Oral, Daily  losartan, 100 mg, Oral, Daily  nystatin, , Topical, BID  phenytoin, 100 mg, Oral, Once per day on Mon Wed Fri  [START ON 5/20/2021] phenytoin, 200 mg, Oral, Once per day on Sun Tue Thu Sat  predniSONE, 7 5 mg, Oral, Daily    Continuous IV Infusions:     PRN Meds:  hydrALAZINE, 5 mg, Intravenous, Q6H PRN  LORazepam, 0 5 mg, Oral, Q8H PRN x 1 5/18,   X 1 5/19    Telemetry  Neuro checks q4h  SCDs    Network Utilization Review Department  ATTENTION: Please call with any questions or concerns to [phone] and carefully listen to the prompts so that you are directed to the right person  All voicemails are confidential   Darius Colunga all requests for admission clinical reviews, approved or denied determinations and any other requests to dedicated fax number below belonging to the campus where the patient is receiving treatment   List of dedicated fax numbers for the Facilities:  [address] DENIALS (Administrative/Medical Necessity) [phone]   [address] (Maternity/NICU/Pediatrics) 261 [facility] [address] [facility] [address] [place] [address] [facility] [address] [place] [address] Lalitha Brandt [address] [phone]

## 2021-05-19 NOTE — DISCHARGE SUMMARY
Higinio [address]  Discharge- Leata Gamma 1945, 66 y o  female MRN: [id number]  Unit/Bed#: E4 -01 Encounter: [id number]  Primary Care Provider: Autumn Osorio MD   Date and time admitted to hospital: 5/18/2021 10:24 AM    * Seizure-like activity [facility])  Assessment & Plan  66year old female with PMH of neurosarcoidosis, warfarin-associated ICH, and post- hemorrhagic epilepsy presented with seizure-like activity  On further evaluation, neurology suspects that this was due to hypertensive urgency  - Outpatient PCP follow-up for further optimization of her regimen   - PT/OT recommending STR, but patient and son elects to go home instead since she just recently completed therapy  Neurosarcoidosis  Assessment & Plan  Continue steroids    Chronic systolic heart failure (HCC)  Assessment & Plan  Wt Readings from Last 3 Encounters:   05/19/21 76 kg (167 lb 8 8 oz)   01/04/21 88 6 kg (195 lb 5 2 oz)   07/30/13 95 3 kg (210 lb)     Not in acute exacerbation   - Continue lasix        Atrial fibrillation (HCC)  Assessment & Plan  Rate controlled  Not on ac due to cerebral bleed    Hyperlipidemia  Assessment & Plan  Continue statin    Hypothyroidism  Assessment & Plan  Continue decreased levothyroxine dose on discharge  Previously was on 100mcg daily  Repeat TSH in 4-6 weeks  Essential hypertension  Assessment & Plan  Continue losartan / amlodipine      Discharging Physician / Practitioner: Catherine Wilson MD  PCP: Autumn Osorio MD  Admission Date:   Admission Orders (From admission, onward)     Ordered        05/18/21 1336  Place in Observation  Once                   Discharge Date: 05/19/21    Resolved Problems  Date Reviewed: 5/19/2021    None          Consultations During Hospital Stay:  · Neurology    Procedures Performed:   · none    Significant Findings / Test Results:   · Imaging  CT stroke alert brain:    1    No acute intracranial CT abnormality      2   Left frontal encephalomalacia and volume loss representing sequela of remote insult/hemorrhage      3   Stable size of slitlike shunted lateral ventricles  Incidental Findings:   · As written above    Test Results Pending at Discharge (will require follow up): · Lamotrigine level     Outpatient Tests Requested:  · Follow-up: PCP  · CBC, BMP, TSH in 4-6 weeks    Complications:  None    Reason for Admission: seizure like symptoms    Hospital Course:     Clifton Mills is a 66 y o  female patient who originally presented to the hospital on 5/18/2021 due to seizure like symptoms  She has a PMH of neurosarcoidosis, a fib not on ac due to warfarin induced intracranial bleed, and epilepsy  She presented with seizure-like symptoms  CT head did not show any acute abnormalities  CTA could not be performed due to her iodine allergies  Neurology was consulted who recommended discontinuation of stroke pathway and instead ordered dilantin and phenytoin levels  But on reevaluation, they suspect that this was precipitated likely by her hypertensive urgency which has since then resolved after being admission  She may benefit from further optimization care of her primary care provider  Currently asymptomatic and without any new complaints  PT/OT evaluated her and recommended rehab  Patient and her son preferred home instead of STR since just recently got back from rehab  Patient agrees to follow-up with her providers as scheduled and to take her medications as prescribed  All questions were addressed  she understood the need to seek immediate medical attention should she develop any chest pain, shortness of breath, severe pain, fever, chills, or any other concerning symptoms  Please see above list of diagnoses and related plan for additional information       Condition at Discharge: fair     Discharge Day Visit / Exam:     Subjective:  Patient  Vitals: Blood Pressure: 157/80 (05/19/21 1552)  Pulse: 69 (05/19/21 1552)  Temperature: (!) 96 8 °F (36 °C) (05/19/21 1552)  Temp Source: Temporal (05/19/21 1552)  Respirations: 18 (05/19/21 1552)  Height: 4' 10" (147 3 cm) (05/18/21 1535)  Weight - Scale: 76 kg (167 lb 8 8 oz) (05/19/21 0600)  SpO2: 94 % (05/19/21 1552)  Exam:   Physical Exam  Vitals signs reviewed  Constitutional:       General: She is not in acute distress  HENT:      Head: Normocephalic  Nose: Nose normal       Mouth/Throat:      Mouth: Mucous membranes are moist    Eyes:      General: No scleral icterus  Cardiovascular:      Rate and Rhythm: Normal rate and regular rhythm  Pulmonary:      Effort: Pulmonary effort is normal  No respiratory distress  Abdominal:      General: There is no distension  Palpations: Abdomen is soft  Tenderness: There is no abdominal tenderness  Skin:     General: Skin is warm  Neurological:      Mental Status: She is alert  Comments: dysarthric   Psychiatric:         Mood and Affect: Mood normal          Behavior: Behavior normal        Discussion with Family: son    Discharge instructions/Information to patient and family:   See after visit summary for information provided to patient and family  Provisions for Follow-Up Care:  See after visit summary for information related to follow-up care and any pertinent home health orders  Disposition:     Home    For Discharges to [facility]:   · Not Applicable to this Patient - Not Applicable to this Patient    Planned Readmission: No     Discharge Statement:  I spent 43 minutes discharging the patient  This time was spent on the day of discharge  I had direct contact with the patient on the day of discharge  Greater than 50% of the total time was spent examining patient, answering all patient questions, arranging and discussing plan of care with patient as well as directly providing post-discharge instructions  Additional time then spent on discharge activities      Discharge Medications:  See after visit summary for reconciled discharge medications provided to patient and family        ** Please Note: This note has been constructed using a voice recognition system **

## 2021-05-20 LAB — LAMOTRIGINE SERPL-MCNC: 12.1 UG/ML (ref 2–20)

## 2022-01-01 ENCOUNTER — APPOINTMENT (EMERGENCY)
Dept: RADIOLOGY | Facility: HOSPITAL | Age: 77
End: 2022-01-01

## 2022-01-01 ENCOUNTER — HOSPITAL ENCOUNTER (OUTPATIENT)
Dept: MRI IMAGING | Facility: HOSPITAL | Age: 77
Discharge: HOME | End: 2022-11-01
Attending: INTERNAL MEDICINE

## 2022-01-01 ENCOUNTER — APPOINTMENT (INPATIENT)
Dept: RADIOLOGY | Facility: HOSPITAL | Age: 77
End: 2022-01-01

## 2022-01-01 ENCOUNTER — NURSING HOME VISIT (OUTPATIENT)
Dept: GERIATRICS | Facility: OTHER | Age: 77
End: 2022-01-01

## 2022-01-01 ENCOUNTER — APPOINTMENT (EMERGENCY)
Dept: CT IMAGING | Facility: HOSPITAL | Age: 77
End: 2022-01-01

## 2022-01-01 ENCOUNTER — HOSPITAL ENCOUNTER (INPATIENT)
Facility: HOSPITAL | Age: 77
LOS: 2 days | End: 2022-11-05
Attending: EMERGENCY MEDICINE | Admitting: INTERNAL MEDICINE

## 2022-01-01 ENCOUNTER — APPOINTMENT (EMERGENCY)
Dept: NON INVASIVE DIAGNOSTICS | Facility: HOSPITAL | Age: 77
End: 2022-01-01

## 2022-01-01 VITALS
BODY MASS INDEX: 29.39 KG/M2 | DIASTOLIC BLOOD PRESSURE: 60 MMHG | WEIGHT: 140 LBS | OXYGEN SATURATION: 96 % | HEIGHT: 58 IN | RESPIRATION RATE: 16 BRPM | SYSTOLIC BLOOD PRESSURE: 130 MMHG | TEMPERATURE: 97.1 F | HEART RATE: 72 BPM

## 2022-01-01 DIAGNOSIS — D86.89 NEUROSARCOIDOSIS IN ADULT: ICD-10-CM

## 2022-01-01 DIAGNOSIS — S82.54XA NONDISPLACED FRACTURE OF MEDIAL MALLEOLUS OF RIGHT TIBIA, INITIAL ENCOUNTER FOR CLOSED FRACTURE: ICD-10-CM

## 2022-01-01 DIAGNOSIS — H53.2 DIPLOPIA: ICD-10-CM

## 2022-01-01 DIAGNOSIS — D62 ACUTE BLOOD LOSS ANEMIA: ICD-10-CM

## 2022-01-01 DIAGNOSIS — R56.9 SEIZURES (HCC): ICD-10-CM

## 2022-01-01 DIAGNOSIS — U07.1 COVID-19 VIRUS DETECTED: ICD-10-CM

## 2022-01-01 DIAGNOSIS — N18.32 STAGE 3B CHRONIC KIDNEY DISEASE (HCC): ICD-10-CM

## 2022-01-01 DIAGNOSIS — S82.91XA: ICD-10-CM

## 2022-01-01 DIAGNOSIS — E03.9 HYPOTHYROIDISM, UNSPECIFIED TYPE: ICD-10-CM

## 2022-01-01 DIAGNOSIS — I48.0 PAROXYSMAL ATRIAL FIBRILLATION (HCC): ICD-10-CM

## 2022-01-01 DIAGNOSIS — E43 SEVERE PROTEIN-CALORIE MALNUTRITION (HCC): Primary | ICD-10-CM

## 2022-01-01 DIAGNOSIS — S82.831A CLOSED FRACTURE OF PROXIMAL END OF RIGHT FIBULA, UNSPECIFIED FRACTURE MORPHOLOGY, INITIAL ENCOUNTER: Primary | ICD-10-CM

## 2022-01-01 DIAGNOSIS — D86.81 MENINGEAL SARCOIDOSIS: ICD-10-CM

## 2022-01-01 DIAGNOSIS — D64.9 ANEMIA, UNSPECIFIED TYPE: ICD-10-CM

## 2022-01-01 DIAGNOSIS — R26.2 AMBULATORY DYSFUNCTION: ICD-10-CM

## 2022-01-01 DIAGNOSIS — I50.22 CHRONIC SYSTOLIC HEART FAILURE (HCC): ICD-10-CM

## 2022-01-01 DIAGNOSIS — R68.89 SUSPECTED DEEP TISSUE INJURY: ICD-10-CM

## 2022-01-01 DIAGNOSIS — H93.11 RIGHT-SIDED TINNITUS: ICD-10-CM

## 2022-01-01 LAB
ABO GROUP BLD: NORMAL
ABO GROUP BLD: NORMAL
ALBUMIN SERPL BCP-MCNC: 2.4 G/DL (ref 3.5–5)
ALBUMIN SERPL BCP-MCNC: 2.5 G/DL (ref 3.5–5)
ALP SERPL-CCNC: 147 U/L (ref 46–116)
ALP SERPL-CCNC: 152 U/L (ref 46–116)
ALT SERPL W P-5'-P-CCNC: 29 U/L (ref 12–78)
ALT SERPL W P-5'-P-CCNC: 31 U/L (ref 12–78)
ANION GAP SERPL CALCULATED.3IONS-SCNC: 8 MMOL/L (ref 4–13)
ANION GAP SERPL CALCULATED.3IONS-SCNC: 9 MMOL/L (ref 4–13)
APTT PPP: 23 SECONDS (ref 23–37)
AST SERPL W P-5'-P-CCNC: 135 U/L (ref 5–45)
ATRIAL RATE: 468 BPM
BASOPHILS # BLD AUTO: 0.02 THOUSANDS/ÂΜL (ref 0–0.1)
BASOPHILS NFR BLD AUTO: 0 % (ref 0–1)
BILIRUB SERPL-MCNC: 0.78 MG/DL (ref 0.2–1)
BILIRUB SERPL-MCNC: 0.81 MG/DL (ref 0.2–1)
BLD GP AB SCN SERPL QL: NEGATIVE
BUN SERPL-MCNC: 17 MG/DL (ref 5–25)
BUN SERPL-MCNC: 20 MG/DL (ref 5–25)
CALCIUM ALBUM COR SERPL-MCNC: 9 MG/DL (ref 8.3–10.1)
CALCIUM ALBUM COR SERPL-MCNC: 9.1 MG/DL (ref 8.3–10.1)
CALCIUM SERPL-MCNC: 7.8 MG/DL (ref 8.3–10.1)
CALCIUM SERPL-MCNC: 7.8 MG/DL (ref 8.3–10.1)
CHLORIDE SERPL-SCNC: 107 MMOL/L (ref 96–108)
CHLORIDE SERPL-SCNC: 109 MMOL/L (ref 96–108)
CO2 SERPL-SCNC: 26 MMOL/L (ref 21–32)
CO2 SERPL-SCNC: 26 MMOL/L (ref 21–32)
CREAT SERPL-MCNC: 1 MG/DL (ref 0.6–1.3)
CREAT SERPL-MCNC: 1.06 MG/DL (ref 0.6–1.3)
EOSINOPHIL # BLD AUTO: 0.02 THOUSAND/ÂΜL (ref 0–0.61)
EOSINOPHIL NFR BLD AUTO: 0 % (ref 0–6)
ERYTHROCYTE [DISTWIDTH] IN BLOOD BY AUTOMATED COUNT: 17.2 % (ref 11.6–15.1)
ERYTHROCYTE [DISTWIDTH] IN BLOOD BY AUTOMATED COUNT: 17.6 % (ref 11.6–15.1)
FERRITIN SERPL-MCNC: 354 NG/ML (ref 8–388)
FLUAV RNA RESP QL NAA+PROBE: NEGATIVE
FLUBV RNA RESP QL NAA+PROBE: NEGATIVE
GFR SERPL CREATININE-BSD FRML MDRD: 50 ML/MIN/1.73SQ M
GFR SERPL CREATININE-BSD FRML MDRD: 53 ML/MIN/1.73SQ M
GLUCOSE SERPL-MCNC: 110 MG/DL (ref 65–140)
GLUCOSE SERPL-MCNC: 66 MG/DL (ref 65–140)
HCT VFR BLD AUTO: 21.5 % (ref 34.8–46.1)
HCT VFR BLD AUTO: 22.3 % (ref 34.8–46.1)
HCT VFR BLD AUTO: 27.9 % (ref 34.8–46.1)
HGB BLD-MCNC: 7.1 G/DL (ref 11.5–15.4)
HGB BLD-MCNC: 7.2 G/DL (ref 11.5–15.4)
HGB BLD-MCNC: 8.9 G/DL (ref 11.5–15.4)
IMM GRANULOCYTES # BLD AUTO: 0.08 THOUSAND/UL (ref 0–0.2)
IMM GRANULOCYTES NFR BLD AUTO: 1 % (ref 0–2)
INR PPP: 1.05 (ref 0.84–1.19)
IRON SATN MFR SERPL: 47 % (ref 15–50)
IRON SERPL-MCNC: 55 UG/DL (ref 50–170)
LYMPHOCYTES # BLD AUTO: 1.06 THOUSANDS/ÂΜL (ref 0.6–4.47)
LYMPHOCYTES NFR BLD AUTO: 12 % (ref 14–44)
MCH RBC QN AUTO: 31.4 PG (ref 26.8–34.3)
MCH RBC QN AUTO: 31.4 PG (ref 26.8–34.3)
MCHC RBC AUTO-ENTMCNC: 32.3 G/DL (ref 31.4–37.4)
MCHC RBC AUTO-ENTMCNC: 33 G/DL (ref 31.4–37.4)
MCV RBC AUTO: 95 FL (ref 82–98)
MCV RBC AUTO: 97 FL (ref 82–98)
MONOCYTES # BLD AUTO: 0.84 THOUSAND/ÂΜL (ref 0.17–1.22)
MONOCYTES NFR BLD AUTO: 10 % (ref 4–12)
NEUTROPHILS # BLD AUTO: 6.6 THOUSANDS/ÂΜL (ref 1.85–7.62)
NEUTS SEG NFR BLD AUTO: 77 % (ref 43–75)
NRBC BLD AUTO-RTO: 1 /100 WBCS
PHENYTOIN SERPL-MCNC: 2.5 UG/ML (ref 10–20)
PLATELET # BLD AUTO: 149 THOUSANDS/UL (ref 149–390)
PLATELET # BLD AUTO: 183 THOUSANDS/UL (ref 149–390)
PMV BLD AUTO: 11 FL (ref 8.9–12.7)
PMV BLD AUTO: 11 FL (ref 8.9–12.7)
POTASSIUM SERPL-SCNC: 3.9 MMOL/L (ref 3.5–5.3)
POTASSIUM SERPL-SCNC: 5.2 MMOL/L (ref 3.5–5.3)
PROT SERPL-MCNC: 5.6 G/DL (ref 6.4–8.4)
PROT SERPL-MCNC: 5.7 G/DL (ref 6.4–8.4)
PROTHROMBIN TIME: 13.7 SECONDS (ref 11.6–14.5)
QRS AXIS: -59 DEGREES
QRSD INTERVAL: 146 MS
QT INTERVAL: 438 MS
QTC INTERVAL: 465 MS
RBC # BLD AUTO: 2.26 MILLION/UL (ref 3.81–5.12)
RBC # BLD AUTO: 2.29 MILLION/UL (ref 3.81–5.12)
RH BLD: POSITIVE
RH BLD: POSITIVE
RSV RNA RESP QL NAA+PROBE: NEGATIVE
SARS-COV-2 RNA RESP QL NAA+PROBE: POSITIVE
SODIUM SERPL-SCNC: 141 MMOL/L (ref 135–147)
SODIUM SERPL-SCNC: 144 MMOL/L (ref 135–147)
SPECIMEN EXPIRATION DATE: NORMAL
T WAVE AXIS: 192 DEGREES
TIBC SERPL-MCNC: 118 UG/DL (ref 250–450)
VENTRICULAR RATE: 68 BPM
WBC # BLD AUTO: 8.62 THOUSAND/UL (ref 4.31–10.16)
WBC # BLD AUTO: 8.66 THOUSAND/UL (ref 4.31–10.16)

## 2022-01-01 RX ORDER — ASCORBIC ACID 250 MG
500 TABLET ORAL DAILY
COMMUNITY

## 2022-01-01 RX ORDER — PHENYTOIN SODIUM 100 MG/1
100 CAPSULE, EXTENDED RELEASE ORAL 3 TIMES WEEKLY
COMMUNITY

## 2022-01-01 RX ORDER — ASPIRIN 81 MG/1
81 TABLET ORAL EVERY OTHER DAY
Status: DISCONTINUED | OUTPATIENT
Start: 2022-01-01 | End: 2022-11-05 | Stop reason: HOSPADM

## 2022-01-01 RX ORDER — QUETIAPINE FUMARATE 25 MG/1
12.5 TABLET, FILM COATED ORAL 2 TIMES DAILY
Status: DISCONTINUED | OUTPATIENT
Start: 2022-01-01 | End: 2022-11-05 | Stop reason: HOSPADM

## 2022-01-01 RX ORDER — PHENYTOIN SODIUM 100 MG/1
200 CAPSULE, EXTENDED RELEASE ORAL
COMMUNITY

## 2022-01-01 RX ORDER — ATORVASTATIN CALCIUM 10 MG/1
10 TABLET, FILM COATED ORAL
Status: DISCONTINUED | OUTPATIENT
Start: 2022-01-01 | End: 2022-11-05 | Stop reason: HOSPADM

## 2022-01-01 RX ORDER — HEPARIN SODIUM 5000 [USP'U]/ML
5000 INJECTION, SOLUTION INTRAVENOUS; SUBCUTANEOUS EVERY 8 HOURS SCHEDULED
Status: DISCONTINUED | OUTPATIENT
Start: 2022-01-01 | End: 2022-01-01

## 2022-01-01 RX ORDER — ZINC SULFATE 50(220)MG
220 CAPSULE ORAL DAILY
Status: DISCONTINUED | OUTPATIENT
Start: 2022-01-01 | End: 2022-11-05 | Stop reason: HOSPADM

## 2022-01-01 RX ORDER — IPRATROPIUM BROMIDE AND ALBUTEROL SULFATE 2.5; .5 MG/3ML; MG/3ML
3 SOLUTION RESPIRATORY (INHALATION) EVERY 8 HOURS
Status: DISCONTINUED | OUTPATIENT
Start: 2022-01-01 | End: 2022-01-01

## 2022-01-01 RX ORDER — ZINC GLUCONATE 50 MG
50 TABLET ORAL DAILY
COMMUNITY

## 2022-01-01 RX ORDER — ASCORBIC ACID 500 MG
500 TABLET ORAL DAILY
Status: DISCONTINUED | OUTPATIENT
Start: 2022-01-01 | End: 2022-11-05 | Stop reason: HOSPADM

## 2022-01-01 RX ORDER — LAMOTRIGINE 150 MG/1
250 TABLET ORAL 2 TIMES DAILY
COMMUNITY

## 2022-01-01 RX ORDER — IPRATROPIUM BROMIDE AND ALBUTEROL SULFATE 2.5; .5 MG/3ML; MG/3ML
3 SOLUTION RESPIRATORY (INHALATION) EVERY 8 HOURS PRN
Status: DISCONTINUED | OUTPATIENT
Start: 2022-01-01 | End: 2022-11-05 | Stop reason: HOSPADM

## 2022-01-01 RX ORDER — ALLOPURINOL 100 MG/1
100 TABLET ORAL DAILY
Status: DISCONTINUED | OUTPATIENT
Start: 2022-01-01 | End: 2022-11-05 | Stop reason: HOSPADM

## 2022-01-01 RX ORDER — MELATONIN
1000 DAILY
Status: DISCONTINUED | OUTPATIENT
Start: 2022-01-01 | End: 2022-11-05 | Stop reason: HOSPADM

## 2022-01-01 RX ORDER — LORAZEPAM 2 MG/ML
0.5 INJECTION INTRAMUSCULAR ONCE
Status: COMPLETED | OUTPATIENT
Start: 2022-01-01 | End: 2022-01-01

## 2022-01-01 RX ORDER — WATER 1000 ML/1000ML
INJECTION, SOLUTION INTRAVENOUS
Status: DISPENSED
Start: 2022-01-01 | End: 2022-01-01

## 2022-01-01 RX ORDER — OLANZAPINE 10 MG/1
5 INJECTION, POWDER, LYOPHILIZED, FOR SOLUTION INTRAMUSCULAR ONCE
Status: COMPLETED | OUTPATIENT
Start: 2022-01-01 | End: 2022-01-01

## 2022-01-01 RX ORDER — ACETAMINOPHEN 500 MG
1000 TABLET ORAL 2 TIMES DAILY
COMMUNITY

## 2022-01-01 RX ORDER — FAMOTIDINE 20 MG/1
20 TABLET, FILM COATED ORAL 2 TIMES DAILY
Status: DISCONTINUED | OUTPATIENT
Start: 2022-01-01 | End: 2022-11-05 | Stop reason: HOSPADM

## 2022-01-01 RX ORDER — LEVOTHYROXINE SODIUM 0.05 MG/1
50 TABLET ORAL
Status: DISCONTINUED | OUTPATIENT
Start: 2022-01-01 | End: 2022-11-05 | Stop reason: HOSPADM

## 2022-01-01 RX ORDER — AMINO ACIDS/PROTEIN HYDROLYS 11G-40/45
30 LIQUID IN PACKET (ML) ORAL 2 TIMES DAILY
COMMUNITY

## 2022-01-01 RX ORDER — LAMOTRIGINE 100 MG/1
250 TABLET ORAL 2 TIMES DAILY
Status: DISCONTINUED | OUTPATIENT
Start: 2022-01-01 | End: 2022-11-05 | Stop reason: HOSPADM

## 2022-01-01 RX ORDER — PHENYTOIN SODIUM 100 MG/1
100 CAPSULE, EXTENDED RELEASE ORAL 3 TIMES WEEKLY
Status: DISCONTINUED | OUTPATIENT
Start: 2022-01-01 | End: 2022-11-05 | Stop reason: HOSPADM

## 2022-01-01 RX ORDER — QUETIAPINE FUMARATE 25 MG/1
12.5 TABLET, FILM COATED ORAL 2 TIMES DAILY
COMMUNITY

## 2022-01-01 RX ORDER — IPRATROPIUM BROMIDE AND ALBUTEROL SULFATE 2.5; .5 MG/3ML; MG/3ML
3 SOLUTION RESPIRATORY (INHALATION) EVERY 8 HOURS PRN
COMMUNITY

## 2022-01-01 RX ORDER — ACETAMINOPHEN 325 MG/1
650 TABLET ORAL EVERY 12 HOURS
Status: DISCONTINUED | OUTPATIENT
Start: 2022-01-01 | End: 2022-11-05 | Stop reason: HOSPADM

## 2022-01-01 RX ORDER — AMLODIPINE BESYLATE 5 MG/1
5 TABLET ORAL DAILY
Status: DISCONTINUED | OUTPATIENT
Start: 2022-01-01 | End: 2022-01-01

## 2022-01-01 RX ORDER — CHOLECALCIFEROL (VITAMIN D3) 10 MCG
1 TABLET ORAL DAILY
Status: DISCONTINUED | OUTPATIENT
Start: 2022-01-01 | End: 2022-11-05 | Stop reason: HOSPADM

## 2022-01-01 RX ORDER — PHENYTOIN SODIUM 100 MG/1
200 CAPSULE, EXTENDED RELEASE ORAL
Status: DISCONTINUED | OUTPATIENT
Start: 2022-11-05 | End: 2022-11-05 | Stop reason: HOSPADM

## 2022-01-01 RX ADMIN — LAMOTRIGINE 250 MG: 100 TABLET ORAL at 09:36

## 2022-01-01 RX ADMIN — Medication 1000 UNITS: at 09:36

## 2022-01-01 RX ADMIN — HEPARIN SODIUM 5000 UNITS: 5000 INJECTION INTRAVENOUS; SUBCUTANEOUS at 13:55

## 2022-01-01 RX ADMIN — ASPIRIN 81 MG: 81 TABLET, COATED ORAL at 09:35

## 2022-01-01 RX ADMIN — PREDNISONE 7.5 MG: 5 TABLET ORAL at 09:35

## 2022-01-01 RX ADMIN — ZINC SULFATE 220 MG (50 MG) CAPSULE 220 MG: CAPSULE at 09:35

## 2022-01-01 RX ADMIN — LEVOTHYROXINE SODIUM 50 MCG: 50 TABLET ORAL at 06:23

## 2022-01-01 RX ADMIN — OXYCODONE HYDROCHLORIDE AND ACETAMINOPHEN 500 MG: 500 TABLET ORAL at 09:35

## 2022-01-01 RX ADMIN — OLANZAPINE 5 MG: 10 INJECTION, POWDER, LYOPHILIZED, FOR SOLUTION INTRAMUSCULAR at 19:14

## 2022-01-01 RX ADMIN — AMLODIPINE BESYLATE 5 MG: 5 TABLET ORAL at 09:35

## 2022-01-01 RX ADMIN — FAMOTIDINE 20 MG: 20 TABLET ORAL at 22:39

## 2022-01-01 RX ADMIN — QUETIAPINE FUMARATE 12.5 MG: 25 TABLET ORAL at 09:36

## 2022-01-01 RX ADMIN — FAMOTIDINE 20 MG: 20 TABLET ORAL at 09:36

## 2022-01-01 RX ADMIN — OLANZAPINE 5 MG: 10 INJECTION, POWDER, FOR SOLUTION INTRAMUSCULAR at 18:01

## 2022-01-01 RX ADMIN — QUETIAPINE FUMARATE 12.5 MG: 25 TABLET ORAL at 22:39

## 2022-01-01 RX ADMIN — ALLOPURINOL 100 MG: 100 TABLET ORAL at 09:35

## 2022-01-01 RX ADMIN — LORAZEPAM 0.5 MG: 2 INJECTION INTRAMUSCULAR; INTRAVENOUS at 10:54

## 2022-01-01 RX ADMIN — ACETAMINOPHEN 650 MG: 325 TABLET ORAL at 09:35

## 2022-01-01 RX ADMIN — HEPARIN SODIUM 5000 UNITS: 5000 INJECTION INTRAVENOUS; SUBCUTANEOUS at 06:23

## 2022-01-01 RX ADMIN — PHENYTOIN SODIUM 100 MG: 100 CAPSULE ORAL at 09:36

## 2022-01-01 RX ADMIN — Medication 1 CAPSULE: at 09:35

## 2022-08-05 ENCOUNTER — APPOINTMENT (EMERGENCY)
Dept: CT IMAGING | Facility: HOSPITAL | Age: 77
End: 2022-08-05
Payer: COMMERCIAL

## 2022-08-05 ENCOUNTER — HOSPITAL ENCOUNTER (EMERGENCY)
Facility: HOSPITAL | Age: 77
Discharge: HOME | End: 2022-08-05
Attending: EMERGENCY MEDICINE
Payer: COMMERCIAL

## 2022-08-05 VITALS
HEART RATE: 56 BPM | OXYGEN SATURATION: 98 % | BODY MASS INDEX: 35.02 KG/M2 | WEIGHT: 167.55 LBS | TEMPERATURE: 98.4 F | RESPIRATION RATE: 15 BRPM | SYSTOLIC BLOOD PRESSURE: 170 MMHG | DIASTOLIC BLOOD PRESSURE: 88 MMHG
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
## 2022-08-05 LAB
ALBUMIN SERPL BCP-MCNC: 3.7 G/DL (ref 3.5–5)
ALP SERPL-CCNC: 132 U/L (ref 46–116)
ALT SERPL W P-5'-P-CCNC: 20 U/L (ref 12–78)
ANION GAP SERPL CALCULATED.3IONS-SCNC: 6 MMOL/L (ref 4–13)
AST SERPL W P-5'-P-CCNC: 34 U/L (ref 5–45)
ATRIAL RATE: 288 BPM
BASOPHILS # BLD AUTO: 0.08 THOUSANDS/ΜL (ref 0–0.1)
BASOPHILS NFR BLD AUTO: 1 % (ref 0–1)
BILIRUB SERPL-MCNC: 0.62 MG/DL (ref 0.2–1)
BUN SERPL-MCNC: 12 MG/DL (ref 5–25)
CALCIUM SERPL-MCNC: 8.7 MG/DL (ref 8.3–10.1)
CHLORIDE SERPL-SCNC: 101 MMOL/L (ref 96–108)
CO2 SERPL-SCNC: 32 MMOL/L (ref 21–32)
CREAT SERPL-MCNC: 1.39 MG/DL (ref 0.6–1.3)
EOSINOPHIL # BLD AUTO: 0.31 THOUSAND/ΜL (ref 0–0.61)
EOSINOPHIL NFR BLD AUTO: 3 % (ref 0–6)
ERYTHROCYTE [DISTWIDTH] IN BLOOD BY AUTOMATED COUNT: 15.3 % (ref 11.6–15.1)
GFR SERPL CREATININE-BSD FRML MDRD: 36 ML/MIN/1.73SQ M
GLUCOSE SERPL-MCNC: 101 MG/DL (ref 65–140)
GLUCOSE SERPL-MCNC: 95 MG/DL (ref 65–140)
HCT VFR BLD AUTO: 41.4 % (ref 34.8–46.1)
HGB BLD-MCNC: 13.2 G/DL (ref 11.5–15.4)
IMM GRANULOCYTES # BLD AUTO: 0.04 THOUSAND/UL (ref 0–0.2)
IMM GRANULOCYTES NFR BLD AUTO: 0 % (ref 0–2)
LYMPHOCYTES # BLD AUTO: 2.56 THOUSANDS/ΜL (ref 0.6–4.47)
LYMPHOCYTES NFR BLD AUTO: 26 % (ref 14–44)
MCH RBC QN AUTO: 31.6 PG (ref 26.8–34.3)
MCHC RBC AUTO-ENTMCNC: 31.9 G/DL (ref 31.4–37.4)
MCV RBC AUTO: 99 FL (ref 82–98)
MONOCYTES # BLD AUTO: 0.65 THOUSAND/ΜL (ref 0.17–1.22)
MONOCYTES NFR BLD AUTO: 7 % (ref 4–12)
NEUTROPHILS # BLD AUTO: 6.16 THOUSANDS/ΜL (ref 1.85–7.62)
NEUTS SEG NFR BLD AUTO: 63 % (ref 43–75)
NRBC BLD AUTO-RTO: 0 /100 WBCS
PLATELET # BLD AUTO: 191 THOUSANDS/UL (ref 149–390)
PMV BLD AUTO: 9.5 FL (ref 8.9–12.7)
POTASSIUM SERPL-SCNC: 4 MMOL/L (ref 3.5–5.3)
PROT SERPL-MCNC: 7.3 G/DL (ref 6.4–8.4)
QRS AXIS: -47 DEGREES
QRSD INTERVAL: 156 MS
QT INTERVAL: 386 MS
QTC INTERVAL: 445 MS
RBC # BLD AUTO: 4.18 MILLION/UL (ref 3.81–5.12)
SODIUM SERPL-SCNC: 139 MMOL/L (ref 135–147)
T WAVE AXIS: 212 DEGREES
VENTRICULAR RATE: 80 BPM
WBC # BLD AUTO: 9.8 THOUSAND/UL (ref 4.31–10.16)

## 2022-08-05 PROCEDURE — 85025 COMPLETE CBC W/AUTO DIFF WBC: CPT | Performed by: EMERGENCY MEDICINE

## 2022-08-05 PROCEDURE — 36415 COLL VENOUS BLD VENIPUNCTURE: CPT | Performed by: EMERGENCY MEDICINE

## 2022-08-05 PROCEDURE — 99284 EMERGENCY DEPT VISIT MOD MDM: CPT

## 2022-08-05 PROCEDURE — 72125 CT NECK SPINE W/O DYE: CPT

## 2022-08-05 PROCEDURE — 99285 EMERGENCY DEPT VISIT HI MDM: CPT | Performed by: EMERGENCY MEDICINE

## 2022-08-05 PROCEDURE — 82948 REAGENT STRIP/BLOOD GLUCOSE: CPT

## 2022-08-05 PROCEDURE — 93005 ELECTROCARDIOGRAM TRACING: CPT

## 2022-08-05 PROCEDURE — 90471 IMMUNIZATION ADMIN: CPT

## 2022-08-05 PROCEDURE — 93010 ELECTROCARDIOGRAM REPORT: CPT | Performed by: STUDENT IN AN ORGANIZED HEALTH CARE EDUCATION/TRAINING PROGRAM

## 2022-08-05 PROCEDURE — 80053 COMPREHEN METABOLIC PANEL: CPT | Performed by: EMERGENCY MEDICINE

## 2022-08-05 PROCEDURE — 70450 CT HEAD/BRAIN W/O DYE: CPT

## 2022-08-05 PROCEDURE — 96360 HYDRATION IV INFUSION INIT: CPT

## 2022-08-05 PROCEDURE — 12001 RPR S/N/AX/GEN/TRNK 2.5CM/<: CPT | Performed by: EMERGENCY MEDICINE

## 2022-08-05 PROCEDURE — 90715 TDAP VACCINE 7 YRS/> IM: CPT | Performed by: EMERGENCY MEDICINE

## 2022-08-05 PROCEDURE — 96361 HYDRATE IV INFUSION ADD-ON: CPT

## 2022-08-05 RX ORDER — LIDOCAINE HYDROCHLORIDE 10 MG/ML
5 INJECTION, SOLUTION EPIDURAL; INFILTRATION; INTRACAUDAL; PERINEURAL ONCE
Status: COMPLETED | OUTPATIENT
Start: 2022-08-05 | End: 2022-08-05

## 2022-08-05 RX ORDER — LORAZEPAM 0.5 MG/1
0.5 TABLET ORAL ONCE
Status: COMPLETED | OUTPATIENT
Start: 2022-08-05 | End: 2022-08-05

## 2022-08-05 RX ADMIN — TETANUS TOXOID, REDUCED DIPHTHERIA TOXOID AND ACELLULAR PERTUSSIS VACCINE, ADSORBED 0.5 ML: 5; 2.5; 8; 8; 2.5 SUSPENSION INTRAMUSCULAR at 09:35

## 2022-08-05 RX ADMIN — LIDOCAINE HYDROCHLORIDE 5 ML: 10 INJECTION, SOLUTION EPIDURAL; INFILTRATION; INTRACAUDAL at 11:47

## 2022-08-05 RX ADMIN — LORAZEPAM 0.5 MG: 0.5 TABLET ORAL at 16:01

## 2022-08-05 RX ADMIN — SODIUM CHLORIDE 500 ML: 0.9 INJECTION, SOLUTION INTRAVENOUS at 09:36

## 2022-08-05 NOTE — ED NOTES
MARCIN called regarding transport  Awaiting call back        Case management also notified regarding disposition       Hedy Brunner, RN  08/05/22 0289

## 2022-08-05 NOTE — CASE MANAGEMENT
Case Management ED Discharge Planning Note    Patient name Reyna Lee  Location ED 21/ED 21 MRN [id number]  : 1945 Date 2022        OBJECTIVE:  Predictive Model Details         52% Factor Value    Risk of Hospital Admission or ED Visit Model Number of ED Visits 1     Is in Relationship No     Has Atrial Fibrillation Yes     Has CVD Yes     Has CHF Yes     Has PCP Yes            Chief Complaint: Pain     Patient Class: Emergency  Preferred Pharmacy:   [id number], [address],  [address] [address]  Phone: [phone] Fax: [phone]    Primary Care Provider: Yris Brumfield MD    Primary Insurance: [facility]  Secondary Insurance:     ED Discharge Details:    Discharge planning discussed with[de-identified] Iza Roman  Freedom of Choice: Yes     CM contacted family/caregiver?: Yes  Were Treatment Team discharge recommendations reviewed with patient/caregiver?: Yes  Did patient/caregiver verbalize understanding of patient care needs?: Yes  Were patient/caregiver advised of the risks associated with not following Treatment Team discharge recommendations?: Yes    Contacts  Patient Contacts: Iza Roman  Relationship to Patient[de-identified] Family  Contact Method: Phone  Phone Number: [phone]  Reason/Outcome: Emergency Contact, Discharge [address]         Is the patient interested in Shanicereji Goodrich at discharge?: Yes  [address] requested[de-identified] Nursing, Occupational Therapy, Physical Therapy, Speech Language Pathology  Home Health Agency Name[de-identified]  [facility] [address] Provider[de-identified] PCP  Home Health Services Needed[de-identified] Evaluate Functional Status and Safety, Gait/ADL Training, Strengthening/Theraputic Exercises to Improve Function  Homebound Criteria Met[de-identified] Requires the Assistance of Another Person for Safe Ambulation or to Leave the Home, Uses an Assist Device (i e  cane, walker, etc)  Supporting Clincal Findings[de-identified] Limited Endurance, Fatigues Quynh Basilio in [employer]    DME Referral Provided  Referral made for DME?: No    Other Referral/Resources/Interventions Provided:  Government Services[de-identified] Area Agency on Aging (Waiver Program)         Treatment Team Recommendation: Home with [address]  Discharge Destination Plan[de-identified] Home with [address]     CM received a call from pt's RN stating son is interested in additional services for his mother  CM m/w pt and pt's son, Fang Trinh lives with his mother and is his primary caretaker  Pt suffered from a stroke 8 years ago with residual deficits  Pt was brought to the ED due to a fall at home  Pt is currently open with [facility] for PT  They see the pt once a week  Pt's son does not feel this is enough  CM made a referral to [facility] requesting resumption of care for PT as well as OT, RN and Speech  This was accepted  Fang Trinh was also agreeable to a referral to [facility]   Referral made  CM also provided Fang Trinh with information on [employer] and [facility]

## 2022-08-05 NOTE — ED PROVIDER NOTES
History  Chief Complaint   Patient presents with    Fall     Pt felt at home and hit head in a stool, no lost of conciseness, no blood thinners expect for a 81 mg aspiring, previous hx of CVS with left sided paralyzes, previous hx of falls with some old bruises      Patient is a 77 yo F with PMH of neurosarcoidosis, AFib (not on AC), and h/o LEFT frontal ICH in 2013 with residual RIGHT sided weakness  She presents to the ED today with her son, who lives with her, after an unwitnessed fall this morning  Her son reports that he left the house to run an [employer] this morning and returned to find the patient down on the ground with a bleeding head wound  On arrival she is not oriented to place, person, or time and her speech is dysarthric  According to her son, this is her baseline and he has not noted any changes in terms of neuro status after the fall  He also reports that she has had several other, less severe falls over the past 7-10 days due to her attempts to ambulate without assistance  Prior to Admission Medications   Prescriptions Last Dose Informant Patient Reported? Taking?    LORazepam (ATIVAN) 1 mg tablet 8/5/2022 at Unknown time  Yes Yes   Sig: Take 0 5 mg by mouth every 8 (eight) hours as needed for anxiety   allopurinol (ZYLOPRIM) 100 mg tablet 8/5/2022 at Unknown time  Yes Yes   Sig: Take 100 mg by mouth daily   amLODIPine (NORVASC) 5 mg tablet 8/5/2022 at Unknown time  Yes Yes   Sig: Take 5 mg by mouth daily   aspirin (ECOTRIN LOW STRENGTH) 81 mg EC tablet 8/5/2022 at Unknown time  Yes Yes   Sig: Take 81 mg by mouth every other day   atorvastatin (LIPITOR) 10 mg tablet 8/5/2022 at Unknown time  Yes Yes   Sig: Take 10 mg by mouth daily   famotidine (PEPCID) 20 mg tablet 8/5/2022 at Unknown time  Yes Yes   Sig: Take 20 mg by mouth 2 (two) times a day   furosemide (LASIX) 40 mg tablet 8/4/2022 at Unknown time  Yes Yes   Sig: Take 40 mg by mouth daily   lamoTRIgine (LaMICtal) 200 MG tablet 8/5/2022 at Unknown time  Yes Yes   Sig: Take 250 mg by mouth 2 (two) times a day   levothyroxine 50 mcg tablet 8/5/2022 at Unknown time  Yes Yes   Sig: Take 50 mcg by mouth daily   losartan (COZAAR) 100 MG tablet 8/5/2022 at Unknown time  Yes Yes   Sig: Take 100 mg by mouth daily   nystatin (MYCOSTATIN) powder Past Week at Unknown time  Yes Yes   Sig: Apply topically as needed   phenytoin (DILANTIN) 100 mg ER capsule 8/5/2022 at Unknown time  Yes Yes   Sig: Take 100 mg by mouth daily Monday, Wednesday & Friday 100mg  Tuesday, Thursday, Saturday and Sunday- 200mg   potassium chloride (MICRO-K) 10 MEQ CR capsule 8/4/2022 at Unknown time  Yes Yes   Sig: Take 10 mEq by mouth 3 (three) times a day   predniSONE 1 mg tablet 8/5/2022 at Unknown time  Yes Yes   Sig: Take 7 5 mg by mouth daily      Facility-Administered Medications: None       Past Medical History:   Diagnosis Date    Disease of thyroid gland     Hypertension     Seizure ([facility] [address] )     Stroke [facility])        History reviewed  No pertinent surgical history  History reviewed  No pertinent family history  I have reviewed and agree with the history as documented  E-Cigarette/Vaping    E-Cigarette Use Never User      E-Cigarette/Vaping Substances    Nicotine No     THC No     CBD No     Flavoring No     Other No     Unknown No      Social History     Tobacco Use    Smoking status: Never Smoker    Smokeless tobacco: Never Used   Vaping Use    Vaping Use: Never used   Substance Use Topics    Alcohol use: Never    Drug use: Never        Review of Systems   Reason unable to perform ROS: ROS taken from son  Constitutional: Negative for appetite change and fever  HENT: Negative  Eyes: Negative  Respiratory: Negative  Cardiovascular: Negative for chest pain  Gastrointestinal: Negative for abdominal pain, diarrhea and vomiting  Endocrine: Negative  Genitourinary: Negative  Musculoskeletal: Positive for gait problem  Skin: Negative  Allergic/Immunologic: Negative  Neurological: Positive for weakness (at baseline per son)  Negative for seizures and syncope  Psychiatric/Behavioral: Positive for confusion (At baseline per son)  Physical Exam  ED Triage Vitals [08/05/22 0741]   Temperature Pulse Respirations Blood Pressure SpO2   98 4 °F (36 9 °C) 98 18 (!) 184/88 95 %      Temp src Heart Rate Source Patient Position - Orthostatic VS BP Location FiO2 (%)   -- Monitor Lying Right arm --      Pain Score       --             Orthostatic Vital Signs  Vitals:    08/05/22 0741 08/05/22 0815 08/05/22 1015   BP: (!) 184/88 (!) 173/97 170/88   Pulse: 98 66 56   Patient Position - Orthostatic VS: Lying Lying Lying       Physical Exam  Constitutional:       General: She is not in acute distress  HENT:      Head: Laceration (1 cm laceration in middle of occiput) present  Right Ear: External ear normal       Left Ear: External ear normal       Mouth/Throat:      Mouth: Mucous membranes are dry  Pharynx: Oropharynx is clear  No posterior oropharyngeal erythema  Eyes:      Extraocular Movements: Extraocular movements intact  Pupils: Pupils are equal, round, and reactive to light  Neck:      Vascular: No carotid bruit  Cardiovascular:      Rate and Rhythm: Normal rate  Rhythm irregular  Pulses: Normal pulses  Heart sounds: Normal heart sounds  No murmur heard  Pulmonary:      Effort: Pulmonary effort is normal       Breath sounds: Normal breath sounds  Abdominal:      Palpations: Abdomen is soft  Tenderness: There is no abdominal tenderness  Musculoskeletal:         General: Swelling (Mild swelling of RLE  Son states this has been unchanged) present  Cervical back: No tenderness  Skin:     General: Skin is warm and dry  Findings: No rash  Neurological:      Mental Status: Mental status is at baseline  She is disoriented  GCS: GCS eye subscore is 4  GCS verbal subscore is 5   GCS motor subscore is 6  Cranial Nerves: Dysarthria present  No cranial nerve deficit  Sensory: No sensory deficit  Motor: Weakness present  Deep Tendon Reflexes:      Reflex Scores:       Tricep reflexes are 1+ on the right side and 4+ on the left side  Bicep reflexes are 1+ on the right side and 4+ on the left side  Brachioradialis reflexes are 4+ on the left side  Patellar reflexes are 4+ on the left side  Achilles reflexes are 2+ on the right side and 4+ on the left side    Psychiatric:         Mood and Affect: Mood normal          ED Medications  Medications   sodium chloride 0 9 % bolus 500 mL (0 mL Intravenous Stopped 8/5/22 1149)   tetanus-diphtheria-acellular pertussis (BOOSTRIX) IM injection 0 5 mL (0 5 mL Intramuscular Given 8/5/22 0935)   lidocaine (PF) (XYLOCAINE-MPF) 1 % injection 5 mL (5 mL Infiltration Given by Other 8/5/22 1147)   LORazepam (ATIVAN) tablet 0 5 mg (0 5 mg Oral Given 8/5/22 1601)       Diagnostic Studies  Results Reviewed     Procedure Component Value Units Date/Time    Comprehensive metabolic panel [[id number]]  (Abnormal) Collected: 08/05/22 0825    Lab Status: Final result Specimen: Blood from Arm, Left Updated: 08/05/22 0915     Sodium 139 mmol/L      Potassium 4 0 mmol/L      Chloride 101 mmol/L      CO2 32 mmol/L      ANION GAP 6 mmol/L      BUN 12 mg/dL      Creatinine 1 39 mg/dL      Glucose 95 mg/dL      Calcium 8 7 mg/dL      AST 34 U/L      ALT 20 U/L      Alkaline Phosphatase 132 U/L      Total Protein 7 3 g/dL      Albumin 3 7 g/dL      Total Bilirubin 0 62 mg/dL      eGFR 36 ml/min/1 73sq m     Narrative:      Danni guidelines for Chronic Kidney Disease (CKD):     Stage 1 with normal or high GFR (GFR > 90 mL/min/1 73 square meters)    Stage 2 Mild CKD (GFR = 60-89 mL/min/1 73 square meters)    Stage 3A Moderate CKD (GFR = 45-59 mL/min/1 73 square meters)    Stage 3B Moderate CKD (GFR = 30-44 mL/min/1 73 square meters)    Stage 4 Severe CKD (GFR = 15-29 mL/min/1 73 square meters)    Stage 5 End Stage CKD (GFR <15 mL/min/1 73 square meters)  Note: GFR calculation is accurate only with a steady state creatinine    CBC and differential [[id number]]  (Abnormal) Collected: 08/05/22 0825    Lab Status: Final result Specimen: Blood from Arm, Left Updated: 08/05/22 0853     WBC 9 80 Thousand/uL      RBC 4 18 Million/uL      Hemoglobin 13 2 g/dL      Hematocrit 41 4 %      MCV 99 fL      MCH 31 6 pg      MCHC 31 9 g/dL      RDW 15 3 %      MPV 9 5 fL      Platelets 767 Thousands/uL      nRBC 0 /100 WBCs      Neutrophils Relative 63 %      Immat GRANS % 0 %      Lymphocytes Relative 26 %      Monocytes Relative 7 %      Eosinophils Relative 3 %      Basophils Relative 1 %      Neutrophils Absolute 6 16 Thousands/µL      Immature Grans Absolute 0 04 Thousand/uL      Lymphocytes Absolute 2 56 Thousands/µL      Monocytes Absolute 0 65 Thousand/µL      Eosinophils Absolute 0 31 Thousand/µL      Basophils Absolute 0 08 Thousands/µL     Fingerstick Glucose (POCT) [[id number]]  (Normal) Collected: 08/05/22 0738    Lab Status: Final result Updated: 08/05/22 0739     POC Glucose 101 mg/dl                  CT head without contrast   Final Result by Alma Rosa Harrison MD (08/05 1374)      1  No acute intracranial abnormality  Microangiopathic changes with old left frontoparietal infarct  2   Stable decompressed ventricles with  shunt in place to                  Workstation performed: [id number]         CT spine cervical without contrast   Final Result by Alma Rosa Harrison MD (08/05 2228)      No cervical spine fracture or traumatic malalignment  Workstation performed: [id number]               Procedures  Procedures      ED Course  ED Course as of 08/07/22 1316   Fri Aug 05, 2022   0830 Labs, [facility], and CT-c spine ordered   1000 CTH and CT c spine negative for acute injury or bleed   CMP shows NADIYA with creatinine to 1 39   1015 Discussed placement of patient after hospital encounter with son at bedside  He was interested in talking with a  about care going forward  I placed to call to CM and requested they see son in ED, they agreed  1100 CM met with patient and son  Resources discussed  1201 Single staple placed in head lac  Provided staple remover to son  Home visiting PCP will be in the home on 8/10  Plan will be to remove the staple on that date  845 [facility] waiting for medical transport       Medical transport was called and patient was discharged in stable condition  SBIRT 20yo+    Flowsheet Row Most Recent Value   SBIRT (25 yo +)    In order to provide better care to our patients, we are screening all of our patients for alcohol and drug use  Would it be okay to ask you these screening questions? Unable to answer at this time Filed at: 08/05/2022 1126                MDM    Disposition  Final diagnoses:   Fall, initial encounter     Time reflects when diagnosis was documented in both MDM as applicable and the Disposition within this note     Time User Action Codes Description Comment    8/5/2022 12:03 PM Mahogany Restrepo Add [[id number]  Melissa Buckley, initial encounter       ED Disposition     ED Disposition   Discharge    Condition   Stable    Date/Time   Fri Aug 5, 2022 12:03 PM    Comment   Lyndsey Morales discharge to home/self care                 Follow-up Information     Follow up With Specialties Details Why [address] [facility]  Follow up  [address] [facility] [address] [phone]    Doris Pike MD Family Medicine, Internal Medicine Schedule an appointment as soon as possible for a visit   [facility] [address] [facility]  [phone]            Discharge Medication List as of 8/5/2022 12:05 PM      CONTINUE these medications which have NOT CHANGED    Details   allopurinol (ZYLOPRIM) 100 mg tablet Take 100 mg by mouth daily, Historical Med amLODIPine (NORVASC) 5 mg tablet Take 5 mg by mouth daily, Historical Med      aspirin (ECOTRIN LOW STRENGTH) 81 mg EC tablet Take 81 mg by mouth every other day, Historical Med      atorvastatin (LIPITOR) 10 mg tablet Take 10 mg by mouth daily, Historical Med      famotidine (PEPCID) 20 mg tablet Take 20 mg by mouth 2 (two) times a day, Historical Med      furosemide (LASIX) 40 mg tablet Take 40 mg by mouth daily, Historical Med      lamoTRIgine (LaMICtal) 200 MG tablet Take 250 mg by mouth 2 (two) times a day, Historical Med      levothyroxine 50 mcg tablet Take 50 mcg by mouth daily, Historical Med      LORazepam (ATIVAN) 1 mg tablet Take 0 5 mg by mouth every 8 (eight) hours as needed for anxiety, Historical Med      losartan (COZAAR) 100 MG tablet Take 100 mg by mouth daily, Historical Med      nystatin (MYCOSTATIN) powder Apply topically as needed, Historical Med      phenytoin (DILANTIN) 100 mg ER capsule Take 100 mg by mouth daily Monday, Wednesday & Friday 100mg  Tuesday, Thursday, Saturday and Sunday- 200mg, Historical Med      potassium chloride (MICRO-K) 10 MEQ CR capsule Take 10 mEq by mouth 3 (three) times a day, Historical Med      predniSONE 1 mg tablet Take 7 5 mg by mouth daily, Historical Med           No discharge procedures on file  PDMP Review     None           ED Provider  Attending physically available and evaluated Leopoldo Lazar  TANNER managed the patient along with the ED Attending      Electronically Signed by   MD Yana Durham MD  08/07/22 1100

## 2022-08-12 ENCOUNTER — APPOINTMENT (EMERGENCY)
Dept: CT IMAGING | Facility: HOSPITAL | Age: 77
End: 2022-08-12
Payer: COMMERCIAL

## 2022-08-12 ENCOUNTER — HOSPITAL ENCOUNTER (EMERGENCY)
Facility: HOSPITAL | Age: 77
Discharge: HOME | End: 2022-08-12
Attending: EMERGENCY MEDICINE
Payer: COMMERCIAL

## 2022-08-12 VITALS
DIASTOLIC BLOOD PRESSURE: 67 MMHG | SYSTOLIC BLOOD PRESSURE: 155 MMHG | RESPIRATION RATE: 16 BRPM | HEART RATE: 56 BPM | OXYGEN SATURATION: 98 % | TEMPERATURE: 97.7 F

## 2022-08-12 DIAGNOSIS — R11.2 NAUSEA AND VOMITING, UNSPECIFIED VOMITING TYPE: Primary | ICD-10-CM

## 2022-08-12 LAB
ALBUMIN SERPL BCP-MCNC: 3.4 G/DL (ref 3.5–5)
ALP SERPL-CCNC: 127 U/L (ref 46–116)
ALT SERPL W P-5'-P-CCNC: 15 U/L (ref 12–78)
ANION GAP SERPL CALCULATED.3IONS-SCNC: 8 MMOL/L (ref 4–13)
AST SERPL W P-5'-P-CCNC: 21 U/L (ref 5–45)
ATRIAL RATE: 277 BPM
BASOPHILS # BLD AUTO: 0.08 THOUSANDS/ΜL (ref 0–0.1)
BASOPHILS NFR BLD AUTO: 1 % (ref 0–1)
BILIRUB SERPL-MCNC: 0.46 MG/DL (ref 0.2–1)
BUN SERPL-MCNC: 9 MG/DL (ref 5–25)
CALCIUM ALBUM COR SERPL-MCNC: 9 MG/DL (ref 8.3–10.1)
CALCIUM SERPL-MCNC: 8.5 MG/DL (ref 8.3–10.1)
CARDIAC TROPONIN I PNL SERPL HS: 7 NG/L
CHLORIDE SERPL-SCNC: 105 MMOL/L (ref 96–108)
CO2 SERPL-SCNC: 29 MMOL/L (ref 21–32)
CREAT SERPL-MCNC: 1.42 MG/DL (ref 0.6–1.3)
EOSINOPHIL # BLD AUTO: 0.11 THOUSAND/ΜL (ref 0–0.61)
EOSINOPHIL NFR BLD AUTO: 1 % (ref 0–6)
ERYTHROCYTE [DISTWIDTH] IN BLOOD BY AUTOMATED COUNT: 15.5 % (ref 11.6–15.1)
GFR SERPL CREATININE-BSD FRML MDRD: 35 ML/MIN/1.73SQ M
GLUCOSE SERPL-MCNC: 149 MG/DL (ref 65–140)
HCT VFR BLD AUTO: 40.6 % (ref 34.8–46.1)
HGB BLD-MCNC: 13 G/DL (ref 11.5–15.4)
IMM GRANULOCYTES # BLD AUTO: 0.06 THOUSAND/UL (ref 0–0.2)
IMM GRANULOCYTES NFR BLD AUTO: 1 % (ref 0–2)
LIPASE SERPL-CCNC: 54 U/L (ref 73–393)
LYMPHOCYTES # BLD AUTO: 0.91 THOUSANDS/ΜL (ref 0.6–4.47)
LYMPHOCYTES NFR BLD AUTO: 10 % (ref 14–44)
MCH RBC QN AUTO: 31.7 PG (ref 26.8–34.3)
MCHC RBC AUTO-ENTMCNC: 32 G/DL (ref 31.4–37.4)
MCV RBC AUTO: 99 FL (ref 82–98)
MONOCYTES # BLD AUTO: 0.64 THOUSAND/ΜL (ref 0.17–1.22)
MONOCYTES NFR BLD AUTO: 7 % (ref 4–12)
NEUTROPHILS # BLD AUTO: 6.99 THOUSANDS/ΜL (ref 1.85–7.62)
NEUTS SEG NFR BLD AUTO: 80 % (ref 43–75)
NRBC BLD AUTO-RTO: 0 /100 WBCS
PLATELET # BLD AUTO: 172 THOUSANDS/UL (ref 149–390)
PMV BLD AUTO: 10.1 FL (ref 8.9–12.7)
POTASSIUM SERPL-SCNC: 4 MMOL/L (ref 3.5–5.3)
PROT SERPL-MCNC: 7.1 G/DL (ref 6.4–8.4)
QRS AXIS: -40 DEGREES
QRSD INTERVAL: 148 MS
QT INTERVAL: 422 MS
QTC INTERVAL: 424 MS
RBC # BLD AUTO: 4.1 MILLION/UL (ref 3.81–5.12)
SODIUM SERPL-SCNC: 142 MMOL/L (ref 135–147)
T WAVE AXIS: 226 DEGREES
TSH SERPL DL<=0.05 MIU/L-ACNC: 0.54 UIU/ML (ref 0.45–4.5)
VENTRICULAR RATE: 61 BPM
WBC # BLD AUTO: 8.79 THOUSAND/UL (ref 4.31–10.16)

## 2022-08-12 PROCEDURE — 83690 ASSAY OF LIPASE: CPT

## 2022-08-12 PROCEDURE — 93010 ELECTROCARDIOGRAM REPORT: CPT | Performed by: INTERNAL MEDICINE

## 2022-08-12 PROCEDURE — 84484 ASSAY OF TROPONIN QUANT: CPT

## 2022-08-12 PROCEDURE — 74176 CT ABD & PELVIS W/O CONTRAST: CPT

## 2022-08-12 PROCEDURE — 99285 EMERGENCY DEPT VISIT HI MDM: CPT

## 2022-08-12 PROCEDURE — 93005 ELECTROCARDIOGRAM TRACING: CPT

## 2022-08-12 PROCEDURE — 84443 ASSAY THYROID STIM HORMONE: CPT

## 2022-08-12 PROCEDURE — 36415 COLL VENOUS BLD VENIPUNCTURE: CPT

## 2022-08-12 PROCEDURE — 80053 COMPREHEN METABOLIC PANEL: CPT

## 2022-08-12 PROCEDURE — 70450 CT HEAD/BRAIN W/O DYE: CPT

## 2022-08-12 PROCEDURE — 85025 COMPLETE CBC W/AUTO DIFF WBC: CPT

## 2022-08-12 PROCEDURE — 99285 EMERGENCY DEPT VISIT HI MDM: CPT | Performed by: EMERGENCY MEDICINE

## 2022-08-12 RX ORDER — LORAZEPAM 1 MG/1
1 TABLET ORAL ONCE
Status: COMPLETED | OUTPATIENT
Start: 2022-08-12 | End: 2022-08-12

## 2022-08-12 RX ADMIN — LORAZEPAM 1 MG: 1 TABLET ORAL at 10:39

## 2022-08-12 NOTE — ED PROVIDER NOTES
History  Chief Complaint   Patient presents with    Vomiting     Pt arrived via ems from home per ems son said she woke up this morning and was fine and then started vomiting     Patient 26-year-old female presenting for vomiting  Past medical history significant for stroke (left frontal ICH 2013),  shunt, hypertension, AFib, CHF, hypothyroid  Patient brought in by EMS services  Patient has baseline right-sided deficits and is dysarthric and is not oriented to person + place + time (pt's baseline)  Patient's son called EMS services after patient began vomiting  Patient is poor historian, keeps saying I am not sick  Patient did admit she vomited after eating  Denies chest pain  On presentation the patient is easily tearful and disoriented  Ecchymosis present bilaterally on pt's arms  Patient recently presented to the ED on 08/05 due to falls   -Patient's son is present was able to provide some history  Patient's son states that this morning after taking her potassium pills she felt dizzy, vomited once and then began having continuous dry heaves  He became concerned and called EMS services at that time  No other noted symptoms or concerns  No hematemesis  No chest pain  Pt's son did confirm she has had minor falls at home for which she was evaluated on 8/05  Prior to Admission Medications   Prescriptions Last Dose Informant Patient Reported? Taking?    LORazepam (ATIVAN) 1 mg tablet   Yes No   Sig: Take 0 5 mg by mouth every 8 (eight) hours as needed for anxiety   allopurinol (ZYLOPRIM) 100 mg tablet   Yes No   Sig: Take 100 mg by mouth daily   amLODIPine (NORVASC) 5 mg tablet   Yes No   Sig: Take 5 mg by mouth daily   aspirin (ECOTRIN LOW STRENGTH) 81 mg EC tablet   Yes No   Sig: Take 81 mg by mouth every other day   atorvastatin (LIPITOR) 10 mg tablet   Yes No   Sig: Take 10 mg by mouth daily   famotidine (PEPCID) 20 mg tablet   Yes No   Sig: Take 20 mg by mouth 2 (two) times a day furosemide (LASIX) 40 mg tablet   Yes No   Sig: Take 40 mg by mouth daily   lamoTRIgine (LaMICtal) 200 MG tablet   Yes No   Sig: Take 250 mg by mouth 2 (two) times a day   levothyroxine 50 mcg tablet   Yes No   Sig: Take 50 mcg by mouth daily   losartan (COZAAR) 100 MG tablet   Yes No   Sig: Take 100 mg by mouth daily   nystatin (MYCOSTATIN) powder   Yes No   Sig: Apply topically as needed   phenytoin (DILANTIN) 100 mg ER capsule   Yes No   Sig: Take 100 mg by mouth daily Monday, Wednesday & Friday 100mg  Tuesday, Thursday, Saturday and Sunday- 200mg   potassium chloride (MICRO-K) 10 MEQ CR capsule   Yes No   Sig: Take 10 mEq by mouth 3 (three) times a day   predniSONE 1 mg tablet   Yes No   Sig: Take 7 5 mg by mouth daily      Facility-Administered Medications: None       Past Medical History:   Diagnosis Date    Disease of thyroid gland     Hypertension     [facility] ([facility] [address] )     Stroke [facility])        History reviewed  No pertinent surgical history  History reviewed  No pertinent family history  I have reviewed and agree with the history as documented  E-Cigarette/Vaping    E-Cigarette Use Never User      E-Cigarette/Vaping Substances    Nicotine No     THC No     CBD No     Flavoring No     Other No     Unknown No      Social History     Tobacco Use    Smoking status: Never Smoker    Smokeless tobacco: Never Used   Vaping Use    Vaping Use: Never used   Substance Use Topics    Alcohol use: Never    Drug use: Never        Review of Systems   Constitutional: Negative  HENT: Negative  Eyes: Negative  Respiratory: Negative  Cardiovascular: Negative for chest pain  Gastrointestinal: Positive for nausea and vomiting  Endocrine: Negative  Genitourinary: Negative  Musculoskeletal: Negative  Skin:        Ecchymosis present bilaterally up and down pt's arms  Allergic/Immunologic: Negative      Neurological:        Pt at reported baseline   Hematological:        Ecchymosis present bilaterally up and down pt's arms  Psychiatric/Behavioral:        Pt at reported baseline  Physical Exam  ED Triage Vitals [08/12/22 0950]   Temp Pulse Respirations Blood Pressure SpO2   -- 82 18 160/83 97 %      Temp src Heart Rate Source Patient Position - Orthostatic VS BP Location FiO2 (%)   -- -- Lying Right arm --      Pain Score       --             Orthostatic Vital Signs  Vitals:    08/12/22 0950   BP: 160/83   Pulse: 82   Patient Position - Orthostatic VS: Lying       Physical Exam  Vitals and nursing note reviewed  Constitutional:       Appearance: She is obese  Comments: Pt at reported baseline  HENT:      Head: Normocephalic and atraumatic  Right Ear: External ear normal       Left Ear: External ear normal       Nose: Nose normal       Mouth/Throat:      Mouth: Mucous membranes are moist       Pharynx: Oropharynx is clear  Eyes:      Comments: Pt at reported baseline   Cardiovascular:      Rate and Rhythm: Normal rate and regular rhythm  Pulses: Normal pulses  Heart sounds: Normal heart sounds  Pulmonary:      Effort: Pulmonary effort is normal       Breath sounds: Normal breath sounds  Abdominal:      General: Abdomen is flat  Bowel sounds are normal       Palpations: Abdomen is soft  Musculoskeletal:         General: Normal range of motion  Cervical back: Normal range of motion and neck supple  Comments: At reported baseline   Skin:     General: Skin is warm and dry  Capillary Refill: Capillary refill takes less than 2 seconds  Neurological:      Mental Status: She is alert  She is disoriented  Motor: Weakness present  Comments: Right sided deficits  Pt at reported baseline   Psychiatric:      Comments: At reported baseline    Pt appears tearful         ED Medications  Medications - No data to display    Diagnostic Studies  Results Reviewed     Procedure Component Value Units Date/Time    HS Troponin 0hr (reflex protocol) [[id number]] Lab Status: No result Specimen: Blood     CBC and differential [[id number]]     Lab Status: No result Specimen: Blood     Comprehensive metabolic panel [[id number]]     Lab Status: No result Specimen: Blood     TSH [[id number]]     Lab Status: No result Specimen: Blood     Lipase [[id number]]     Lab Status: No result Specimen: Blood                  CT abdomen pelvis wo contrast    (Results Pending)         Procedures  ECG 12 Lead Documentation Only    Date/Time: 8/12/2022 10:29 AM  Performed by: Jovanna Joshi MD  Authorized by: Jovanna Joshi MD     Indications / Diagnosis:  Vomiting  ECG reviewed by me, the ED Provider: yes    Patient location:  ED  Previous ECG:     Previous ECG:  Compared to current    Comparison ECG info:  Concern over increased ST depression in leads III and increased elevations in aVR    Similarity:  Changes noted    Comparison to cardiac monitor: Yes    Interpretation:     Interpretation: abnormal    Rate:     ECG rate assessment: normal    Rhythm:     Rhythm: atrial fibrillation    QRS:     QRS axis:  Left    QRS intervals: Wide  ST segments:     ST segments:  Abnormal    Elevation:  AVR    Depression:  III and II          ED Course  ED Course as of 08/12/22 1331   Fri Aug 12, 2022   1058 Discussed ECG with cardiology  Noted Afib, LBBB  No changes from previous ecg   1317 No noted abnormalities on CT head  CT abomen/pelvis just noted for small right sided pleural effusion and small pericardial effusion  No abdominal/pelvic pathology noted  1322 No concerning lab results  CT results and labs discussed w/ pt and her son  Pt's son understands and is agreeable to discharge  Will arrange transport home                                       MDM  Number of Diagnoses or Management Options  Nausea and vomiting, unspecified vomiting type: new and requires workup  Diagnosis management comments: Patient is 24-year-old woman presenting for vomiting    Ddx: Food poisoning, ACS, Gastroenteritis, Stroke  Given pt's extensive pmh and poor historian, will obtain CBC, CMP, Lipase, Troponin, TSH, and ECG to evaluate for abdominal and cardiac pathology  CT abdomen/pelvis ordered to evaluate for abdominal pathology  Pt has  shunt  Will order CT head  Amount and/or Complexity of Data Reviewed  Clinical lab tests: ordered and reviewed  Tests in the radiology section of CPT®: ordered and reviewed  Obtain history from someone other than the patient: yes  Review and summarize past medical records: yes  Independent visualization of images, tracings, or specimens: yes    Risk of Complications, Morbidity, and/or Mortality  Presenting problems: low  Diagnostic procedures: low  Management options: low    Patient Progress  Patient progress: stable      Disposition  Final diagnoses:   None     ED Disposition     None      Follow-up Information    None         Patient's Medications   Discharge Prescriptions    No medications on file     No discharge procedures on file  PDMP Review     None           ED Provider  Attending physically available and evaluated Nydia Solid  I managed the patient along with the ED Attending      Electronically Signed by         Jesus Ann MD  08/12/22 1882

## 2022-08-30 ENCOUNTER — APPOINTMENT (EMERGENCY)
Dept: CT IMAGING | Facility: HOSPITAL | Age: 77
End: 2022-08-30
Payer: COMMERCIAL

## 2022-08-30 ENCOUNTER — HOSPITAL ENCOUNTER (EMERGENCY)
Facility: HOSPITAL | Age: 77
Discharge: HOME | End: 2022-08-30
Attending: EMERGENCY MEDICINE
Payer: COMMERCIAL

## 2022-08-30 ENCOUNTER — APPOINTMENT (OUTPATIENT)
Dept: RADIOLOGY | Facility: HOSPITAL | Age: 77
End: 2022-08-30
Payer: COMMERCIAL

## 2022-08-30 VITALS
OXYGEN SATURATION: 97 % | SYSTOLIC BLOOD PRESSURE: 174 MMHG | RESPIRATION RATE: 18 BRPM | HEIGHT: 58 IN | BODY MASS INDEX: 32.3 KG/M2 | DIASTOLIC BLOOD PRESSURE: 82 MMHG | WEIGHT: 153.88 LBS | TEMPERATURE: 97.8 F | HEART RATE: 55 BPM

## 2022-08-30 DIAGNOSIS — R42 DIZZINESS: Primary | ICD-10-CM

## 2022-08-30 DIAGNOSIS — E87.6 HYPOKALEMIA: ICD-10-CM

## 2022-08-30 LAB
2HR DELTA HS TROPONIN: 6 NG/L
ANION GAP SERPL CALCULATED.3IONS-SCNC: 10 MMOL/L (ref 4–13)
ATRIAL RATE: 267 BPM
ATRIAL RATE: 288 BPM
ATRIAL RATE: 62 BPM
BASOPHILS # BLD AUTO: 0.1 THOUSANDS/ΜL (ref 0–0.1)
BASOPHILS NFR BLD AUTO: 1 % (ref 0–1)
BUN SERPL-MCNC: 9 MG/DL (ref 5–25)
CALCIUM SERPL-MCNC: 8.8 MG/DL (ref 8.3–10.1)
CARDIAC TROPONIN I PNL SERPL HS: 10 NG/L
CARDIAC TROPONIN I PNL SERPL HS: 16 NG/L
CHLORIDE SERPL-SCNC: 103 MMOL/L (ref 96–108)
CO2 SERPL-SCNC: 30 MMOL/L (ref 21–32)
CREAT SERPL-MCNC: 1.36 MG/DL (ref 0.6–1.3)
EOSINOPHIL # BLD AUTO: 0.3 THOUSAND/ΜL (ref 0–0.61)
EOSINOPHIL NFR BLD AUTO: 3 % (ref 0–6)
ERYTHROCYTE [DISTWIDTH] IN BLOOD BY AUTOMATED COUNT: 15.2 % (ref 11.6–15.1)
GFR SERPL CREATININE-BSD FRML MDRD: 37 ML/MIN/1.73SQ M
GLUCOSE SERPL-MCNC: 140 MG/DL (ref 65–140)
HCT VFR BLD AUTO: 39.8 % (ref 34.8–46.1)
HGB BLD-MCNC: 12.7 G/DL (ref 11.5–15.4)
IMM GRANULOCYTES # BLD AUTO: 0.06 THOUSAND/UL (ref 0–0.2)
IMM GRANULOCYTES NFR BLD AUTO: 1 % (ref 0–2)
LYMPHOCYTES # BLD AUTO: 2.34 THOUSANDS/ΜL (ref 0.6–4.47)
LYMPHOCYTES NFR BLD AUTO: 22 % (ref 14–44)
MCH RBC QN AUTO: 31 PG (ref 26.8–34.3)
MCHC RBC AUTO-ENTMCNC: 31.9 G/DL (ref 31.4–37.4)
MCV RBC AUTO: 97 FL (ref 82–98)
MONOCYTES # BLD AUTO: 0.63 THOUSAND/ΜL (ref 0.17–1.22)
MONOCYTES NFR BLD AUTO: 6 % (ref 4–12)
NEUTROPHILS # BLD AUTO: 7.04 THOUSANDS/ΜL (ref 1.85–7.62)
NEUTS SEG NFR BLD AUTO: 67 % (ref 43–75)
NRBC BLD AUTO-RTO: 0 /100 WBCS
NT-PROBNP SERPL-MCNC: 1345 PG/ML
PLATELET # BLD AUTO: 212 THOUSANDS/UL (ref 149–390)
PMV BLD AUTO: 10.2 FL (ref 8.9–12.7)
POTASSIUM SERPL-SCNC: 3 MMOL/L (ref 3.5–5.3)
QRS AXIS: -58 DEGREES
QRS AXIS: -62 DEGREES
QRS AXIS: 48 DEGREES
QRSD INTERVAL: 156 MS
QRSD INTERVAL: 158 MS
QRSD INTERVAL: 42 MS
QT INTERVAL: 308 MS
QT INTERVAL: 416 MS
QT INTERVAL: 446 MS
QTC INTERVAL: 407 MS
QTC INTERVAL: 436 MS
QTC INTERVAL: 467 MS
RBC # BLD AUTO: 4.1 MILLION/UL (ref 3.81–5.12)
SODIUM SERPL-SCNC: 143 MMOL/L (ref 135–147)
T WAVE AXIS: 163 DEGREES
T WAVE AXIS: 224 DEGREES
T WAVE AXIS: 254 DEGREES
VENTRICULAR RATE: 105 BPM
VENTRICULAR RATE: 66 BPM
VENTRICULAR RATE: 66 BPM
WBC # BLD AUTO: 10.47 THOUSAND/UL (ref 4.31–10.16)

## 2022-08-30 PROCEDURE — 96374 THER/PROPH/DIAG INJ IV PUSH: CPT

## 2022-08-30 PROCEDURE — 99285 EMERGENCY DEPT VISIT HI MDM: CPT | Performed by: EMERGENCY MEDICINE

## 2022-08-30 PROCEDURE — 71045 X-RAY EXAM CHEST 1 VIEW: CPT

## 2022-08-30 PROCEDURE — 96361 HYDRATE IV INFUSION ADD-ON: CPT

## 2022-08-30 PROCEDURE — 83880 ASSAY OF NATRIURETIC PEPTIDE: CPT | Performed by: EMERGENCY MEDICINE

## 2022-08-30 PROCEDURE — G1004 CDSM NDSC: HCPCS

## 2022-08-30 PROCEDURE — 99285 EMERGENCY DEPT VISIT HI MDM: CPT

## 2022-08-30 PROCEDURE — 36415 COLL VENOUS BLD VENIPUNCTURE: CPT | Performed by: EMERGENCY MEDICINE

## 2022-08-30 PROCEDURE — 84484 ASSAY OF TROPONIN QUANT: CPT | Performed by: EMERGENCY MEDICINE

## 2022-08-30 PROCEDURE — 93005 ELECTROCARDIOGRAM TRACING: CPT

## 2022-08-30 PROCEDURE — 96375 TX/PRO/DX INJ NEW DRUG ADDON: CPT

## 2022-08-30 PROCEDURE — 80048 BASIC METABOLIC PNL TOTAL CA: CPT | Performed by: EMERGENCY MEDICINE

## 2022-08-30 PROCEDURE — 96372 THER/PROPH/DIAG INJ SC/IM: CPT

## 2022-08-30 PROCEDURE — 85025 COMPLETE CBC W/AUTO DIFF WBC: CPT | Performed by: EMERGENCY MEDICINE

## 2022-08-30 PROCEDURE — 70450 CT HEAD/BRAIN W/O DYE: CPT

## 2022-08-30 PROCEDURE — 93010 ELECTROCARDIOGRAM REPORT: CPT | Performed by: STUDENT IN AN ORGANIZED HEALTH CARE EDUCATION/TRAINING PROGRAM

## 2022-08-30 RX ORDER — HALOPERIDOL 5 MG/ML
5 INJECTION INTRAMUSCULAR ONCE
Status: COMPLETED | OUTPATIENT
Start: 2022-08-30 | End: 2022-08-30

## 2022-08-30 RX ORDER — LORAZEPAM 2 MG/ML
1 INJECTION INTRAMUSCULAR ONCE
Status: DISCONTINUED | OUTPATIENT
Start: 2022-08-30 | End: 2022-08-30

## 2022-08-30 RX ORDER — NYSTATIN 100000 U/G
CREAM TOPICAL AS NEEDED
COMMUNITY
End: 2022-10-12

## 2022-08-30 RX ORDER — DIPHENHYDRAMINE HYDROCHLORIDE 50 MG/ML
25 INJECTION INTRAMUSCULAR; INTRAVENOUS ONCE
Status: DISCONTINUED | OUTPATIENT
Start: 2022-08-30 | End: 2022-08-30

## 2022-08-30 RX ORDER — ONDANSETRON 2 MG/ML
4 INJECTION INTRAMUSCULAR; INTRAVENOUS ONCE
Status: DISCONTINUED | OUTPATIENT
Start: 2022-08-30 | End: 2022-08-30

## 2022-08-30 RX ORDER — FUROSEMIDE 10 MG/ML
40 INJECTION INTRAMUSCULAR; INTRAVENOUS ONCE
Status: COMPLETED | OUTPATIENT
Start: 2022-08-30 | End: 2022-08-30

## 2022-08-30 RX ORDER — DIPHENHYDRAMINE HYDROCHLORIDE 50 MG/ML
25 INJECTION INTRAMUSCULAR; INTRAVENOUS ONCE
Status: COMPLETED | OUTPATIENT
Start: 2022-08-30 | End: 2022-08-30

## 2022-08-30 RX ORDER — VITAMIN B COMPLEX
1 CAPSULE ORAL DAILY
COMMUNITY

## 2022-08-30 RX ORDER — MELATONIN
1000 DAILY
COMMUNITY

## 2022-08-30 RX ADMIN — DIPHENHYDRAMINE HYDROCHLORIDE 25 MG: 50 INJECTION, SOLUTION INTRAMUSCULAR; INTRAVENOUS at 12:19

## 2022-08-30 RX ADMIN — SODIUM CHLORIDE 500 ML: 0.9 INJECTION, SOLUTION INTRAVENOUS at 12:16

## 2022-08-30 RX ADMIN — HALOPERIDOL LACTATE 5 MG: 5 INJECTION, SOLUTION INTRAMUSCULAR at 11:00

## 2022-08-30 RX ADMIN — FUROSEMIDE 40 MG: 10 INJECTION, SOLUTION INTRAVENOUS at 18:33

## 2022-08-30 NOTE — ED NOTES
RN called MARCIN for update on ride and was told " there are 40 pending rides" will call us with an update if anything changes     Sterling Rg RN  08/30/22 8328

## 2022-08-30 NOTE — ED NOTES
Pt's son is leaving at this time and would like to be updated with pick-up time  Number in chart        Melissa Part  08/30/22 6538

## 2022-08-30 NOTE — DISCHARGE INSTRUCTIONS
Increase your home potassium for the next 3 days to 20mEq (2 capsules) three times a day       Have the BMP repeated in 1 week (lab slip provided)

## 2022-08-30 NOTE — ED NOTES
Meal tray delivered  Pt states she does not want to eat at this time        Dio Bansal RN  08/30/22 5454

## 2022-08-30 NOTE — ED NOTES
RN went to perform pts EKG and pt began kicking and screaming yelling "NO"  Provider made aware        Ceferino Woodward RN  08/30/22 6264

## 2022-08-30 NOTE — ED PROVIDER NOTES
History  Chief Complaint   Patient presents with    Dizziness     Pt presents via EMS from home  Per pt son, pt was complaining of dizziness while sitting on couch  Pt reporting dizziness and nausea starting this morning  Pt denies CP     64y F from home, brought in by son for evaluation of reported dizziness this am   Son states she complained of spinning/vertigo sensation, had some dry heaves  Dizziness has improved, but pt is also coughing up "a lot of mucus/phlegm"  Son reports two other visits for dizziness over the last 1-2 months  Pt does have "severe seasonal allergies" per the son  Pt has not complaints at this time and is only intermittently cooperative     wax/wane mental status that son reports is basline      History provided by:  Patient, medical records and relative  History limited by: wax/wane mental status that son reports is basline   used: No    Dizziness  Quality:  Room spinning  Severity:  Severe  Onset quality:  Sudden  Duration: unclear  Timing:  Intermittent  Progression:  Resolved  Chronicity:  Recurrent  Context: head movement    Relieved by:  Nothing  Worsened by: Movement  Ineffective treatments:  Closing eyes  Associated symptoms: no chest pain, no diarrhea, no headaches, no nausea, no shortness of breath, no vision changes and no vomiting    Risk factors: hx of stroke        Prior to Admission Medications   Prescriptions Last Dose Informant Patient Reported? Taking?    LORazepam (ATIVAN) 1 mg tablet 8/30/2022 at Unknown time  Yes Yes   Sig: Take 0 5 mg by mouth every 8 (eight) hours as needed for anxiety   allopurinol (ZYLOPRIM) 100 mg tablet 8/30/2022 at Unknown time  Yes Yes   Sig: Take 100 mg by mouth daily   amLODIPine (NORVASC) 5 mg tablet 8/30/2022 at Unknown time  Yes Yes   Sig: Take 5 mg by mouth daily   aspirin (ECOTRIN LOW STRENGTH) 81 mg EC tablet 8/29/2022 at Unknown time  Yes Yes   Sig: Take 81 mg by mouth every other day   atorvastatin (LIPITOR) 10 mg tablet 8/30/2022 at Unknown time  Yes Yes   Sig: Take 10 mg by mouth daily   b complex vitamins capsule   Yes Yes   Sig: Take 1 capsule by mouth daily   cholecalciferol (VITAMIN D3) 1,000 units tablet   Yes Yes   Sig: Take 1,000 Units by mouth daily   famotidine (PEPCID) 20 mg tablet 8/30/2022 at Unknown time  Yes Yes   Sig: Take 20 mg by mouth 2 (two) times a day   furosemide (LASIX) 40 mg tablet 8/29/2022 at Unknown time  Yes Yes   Sig: Take 40 mg by mouth daily   lamoTRIgine (LaMICtal) 200 MG tablet 8/30/2022 at Unknown time  Yes Yes   Sig: Take 250 mg by mouth 2 (two) times a day   levothyroxine 50 mcg tablet 8/30/2022 at Unknown time  Yes Yes   Sig: Take 50 mcg by mouth daily   losartan (COZAAR) 100 MG tablet 8/30/2022 at Unknown time  Yes Yes   Sig: Take 100 mg by mouth daily   nystatin (MYCOSTATIN) cream   Yes Yes   Sig: Apply topically as needed   phenytoin (DILANTIN) 100 mg ER capsule 8/30/2022 at Unknown time  Yes Yes   Sig: Take 100 mg by mouth daily Monday, Wednesday & Friday 100mg  Tuesday, Thursday, Saturday and Sunday- 200mg   potassium chloride (MICRO-K) 10 MEQ CR capsule 8/29/2022 at Unknown time  Yes Yes   Sig: Take 10 mEq by mouth 3 (three) times a day   predniSONE 1 mg tablet 8/30/2022 at Unknown time  Yes Yes   Sig: Take 7 5 mg by mouth daily      Facility-Administered Medications: None       Past Medical History:   Diagnosis Date    Disease of thyroid gland     Hypertension     Seizure ([facility] [address] )     Stroke [facility])        History reviewed  No pertinent surgical history  History reviewed  No pertinent family history  I have reviewed and agree with the history as documented      E-Cigarette/Vaping    E-Cigarette Use Never User      E-Cigarette/Vaping Substances    Nicotine No     THC No     CBD No     Flavoring No     Other No     Unknown No      Social History     Tobacco Use    Smoking status: Never Smoker    Smokeless tobacco: Never Used   Vaping Use    Vaping Use: Never used Substance Use Topics    Alcohol use: Never    Drug use: Never       Review of Systems   Respiratory: Negative for shortness of breath  Cardiovascular: Negative for chest pain  Gastrointestinal: Negative for diarrhea, nausea and vomiting  Neurological: Positive for dizziness  Negative for headaches  All other systems reviewed and are negative  Physical Exam  Physical Exam  Vitals and nursing note reviewed  Constitutional:       General: She is not in acute distress  Appearance: She is not ill-appearing, toxic-appearing or diaphoretic  Comments: Appears elderly and chronically ill   HENT:      Nose: Nose normal       Mouth/Throat:      Mouth: Mucous membranes are moist    Eyes:      Extraocular Movements: Extraocular movements intact  Conjunctiva/sclera: Conjunctivae normal       Pupils: Pupils are equal, round, and reactive to light  Cardiovascular:      Rate and Rhythm: Regular rhythm  Bradycardia present  Pulses: Normal pulses  Pulmonary:      Effort: No tachypnea, accessory muscle usage, respiratory distress or retractions  Breath sounds: Normal air entry  Rales (faint scattered bibasilar crackles) present  Abdominal:      Tenderness: There is no abdominal tenderness  Musculoskeletal:         General: No tenderness  Cervical back: Normal range of motion  Skin:     General: Skin is warm  Neurological:      Mental Status: She is alert  Mental status is at baseline  GCS: GCS eye subscore is 3  GCS verbal subscore is 4  GCS motor subscore is 6  Comments: RUE hand/wrist contractures, poor tone overall on the right side   Psychiatric:         Mood and Affect: Affect is labile  Speech: Speech is delayed  Behavior: Behavior is agitated (interspersed w/ episodes of cooperation) and aggressive  Cognition and Memory: Cognition is impaired  Judgment: Judgment is impulsive           Vital Signs  ED Triage Vitals   Temperature Pulse Respirations Blood Pressure SpO2   08/30/22 1054 08/30/22 0924 08/30/22 0924 08/30/22 0924 08/30/22 0924   97 8 °F (36 6 °C) 74 16 166/82 94 %      Temp Source Heart Rate Source Patient Position - Orthostatic VS BP Location FiO2 (%)   08/30/22 1054 08/30/22 0924 08/30/22 0924 08/30/22 0924 --   Oral Monitor Lying Right arm       Pain Score       08/30/22 0924       No Pain           Vitals:    08/30/22 1100 08/30/22 1205 08/30/22 1516 08/30/22 1830   BP: (!) 172/74 126/63 160/75 (!) 174/82   Pulse: 68 64 57 55   Patient Position - Orthostatic VS: Lying Lying Lying Lying         Visual Acuity  Visual Acuity    Flowsheet Row Most Recent Value   L Pupil Size (mm) 3   R Pupil Size (mm) 3          ED Medications  Medications   sodium chloride 0 9 % bolus 500 mL (0 mL Intravenous Stopped 8/30/22 1446)   diphenhydrAMINE (BENADRYL) injection 25 mg (25 mg Intravenous Given 8/30/22 1219)   haloperidol lactate (HALDOL) injection 5 mg (5 mg Intramuscular Given 8/30/22 1100)   furosemide (LASIX) injection 40 mg (40 mg Intravenous Given 8/30/22 1833)       Diagnostic Studies  Results Reviewed     Procedure Component Value Units Date/Time    HS Troponin I 2hr [[id number]]  (Normal) Collected: 08/30/22 1509    Lab Status: Final result Specimen: Blood from Arm, Left Updated: 08/30/22 1541     hs TnI 2hr 16 ng/L      Delta 2hr hsTnI 6 ng/L     Basic metabolic panel [[id number]]  (Abnormal) Collected: 08/30/22 1055    Lab Status: Final result Specimen: Blood from Arm, Left Updated: 08/30/22 1124     Sodium 143 mmol/L      Potassium 3 0 mmol/L      Chloride 103 mmol/L      CO2 30 mmol/L      ANION GAP 10 mmol/L      BUN 9 mg/dL      Creatinine 1 36 mg/dL      Glucose 140 mg/dL      Calcium 8 8 mg/dL      eGFR 37 ml/min/1 73sq m     Narrative:      [place] guidelines for Chronic Kidney Disease (CKD):     Stage 1 with normal or high GFR (GFR > 90 mL/min/1 73 square meters)    Stage 2 Mild CKD (GFR = 60-89 mL/min/1 73 square meters)    Stage 3A Moderate CKD (GFR = 45-59 mL/min/1 73 square meters)    Stage 3B Moderate CKD (GFR = 30-44 mL/min/1 73 square meters)    Stage 4 Severe CKD (GFR = 15-29 mL/min/1 73 square meters)    Stage 5 End Stage CKD (GFR <15 mL/min/1 73 square meters)  Note: GFR calculation is accurate only with a steady state creatinine    NT-BNP PRO [[id number]]  (Abnormal) Collected: 08/30/22 1055    Lab Status: Final result Specimen: Blood from Arm, Left Updated: 08/30/22 1124     NT-proBNP 1,345 pg/mL     HS Troponin 0hr (reflex protocol) [[id number]]  (Normal) Collected: 08/30/22 1055    Lab Status: Final result Specimen: Blood from Arm, Left Updated: 08/30/22 1122     hs TnI 0hr 10 ng/L     CBC and differential [[id number]]  (Abnormal) Collected: 08/30/22 1055    Lab Status: Final result Specimen: Blood from Arm, Left Updated: 08/30/22 1101     WBC 10 47 Thousand/uL      RBC 4 10 Million/uL      Hemoglobin 12 7 g/dL      Hematocrit 39 8 %      MCV 97 fL      MCH 31 0 pg      MCHC 31 9 g/dL      RDW 15 2 %      MPV 10 2 fL      Platelets 214 Thousands/uL      nRBC 0 /100 WBCs      Neutrophils Relative 67 %      Immat GRANS % 1 %      Lymphocytes Relative 22 %      Monocytes Relative 6 %      Eosinophils Relative 3 %      Basophils Relative 1 %      Neutrophils Absolute 7 04 Thousands/µL      Immature Grans Absolute 0 06 Thousand/uL      Lymphocytes Absolute 2 34 Thousands/µL      Monocytes Absolute 0 63 Thousand/µL      Eosinophils Absolute 0 30 Thousand/µL      Basophils Absolute 0 10 Thousands/µL                  CT head wo contrast   ED Interpretation by Sola Topete DO (08/30 1538)   See below      Final Result by Hugo Morin MD (08/30 1534)      No acute intracranial pathology  Stable encephalomalacia  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                    Workstation performed: [id number]         XR chest 1 view portable   ED Interpretation by Lindsay Laird DO (08/30 1151)   Abnormal   Rotated, cardiomeg, increased vascular markings      Final Result by Rik Hinson MD (08/30 1212)      Small right pleural effusion and right basilar atelectasis  Workstation performed: [id number]                    Procedures  ECG 12 Lead Documentation Only    Date/Time: 8/30/2022 10:29 AM  Performed by: Lindsay Laird DO  Authorized by: Lindsay Laird DO     ECG reviewed by me, the ED Provider: yes    Patient location:  ED  Previous ECG:     Previous ECG:  Unavailable  Interpretation:     Interpretation: abnormal    Rate:     ECG rate:  66    ECG rate assessment: normal    Rhythm:     Rhythm: sinus rhythm    QRS:     QRS axis:  Left  Conduction:     Conduction: abnormal      Abnormal conduction: complete LBBB               ED Course  ED Course as of 08/31/22 1855   Tue Aug 30, 2022   1001 Reported iodine allergy - son reports hives  On record review, "cath dye" is noted from 2002 - again w/ no reaction noted  Will pre-treat w/ benadryl prior to imaging   1038 Pt agitated, pulling at leads  Attempted to discuss w/u w/ patient to see if she wanted to refuse and would understand risks, but unfortunately, I feel pt does not have the capacity to understand the risks of refusing a medical work up  Son wants to proceed   1201 NT-proBNP(!): 1,345   1430 Multiple attempts at lines in the department  Has had US placed lines for IV study and both have blown / infiltrated  At this point, will do non-contrasted study and d/w son results of lab/imaging, etc    1436 D/w ekg/lab/rad results  Will give dose of lasix here  Son reports he would prefer to just increase patient's potassium doses at home rather than do either liquid or crushed pill here      Son reports prior to pts fall approx 3 wks ago pt was improving w/ therapy at home  was able to stand / walk a few steps w/ cane in the left hand and he hopes to get her back to her therapy as sson as possible    Will have pt f/u w/ PCM - recommended repeat BMP in a week to recheck the potassium   1543 hs TnI 2hr: 16   1543 Delta 2hr hsTnI: 6  More than 2 hours, but no acute EKG changes noted  SBIRT 22yo+    Flowsheet Row Most Recent Value   SBIRT (23 yo +)    In order to provide better care to our patients, we are screening all of our patients for alcohol and drug use  Would it be okay to ask you these screening questions? No Filed at: 08/30/2022 0930                    MDM  Number of Diagnoses or Management Options  Dizziness: new and requires workup  Hypokalemia: new and requires workup     Amount and/or Complexity of Data Reviewed  Clinical lab tests: reviewed and ordered  Tests in the radiology section of CPT®: reviewed and ordered  Tests in the medicine section of CPT®: reviewed and ordered  Decide to obtain previous medical records or to obtain history from someone other than the patient: yes  Obtain history from someone other than the patient: yes  Independent visualization of images, tracings, or specimens: yes        Disposition  Final diagnoses:   Dizziness   Hypokalemia     Time reflects when diagnosis was documented in both MDM as applicable and the Disposition within this note     Time User Action Codes Description Comment    8/30/2022  3:56 PM Daprameze Tyrel L Add [R42] Dizziness     8/30/2022  3:56 PM Daphene Tyrel L Add [E87 6] Hypokalemia       ED Disposition     ED Disposition   Discharge    Condition   Stable    Date/Time   Tue Aug 30, 2022  3:56 PM    Comment   Syed Hardin discharge to home/self care                 MD Documentation    [address] Nicki Brown  Most Recent Value   Patient Condition The patient has been stabilized such that within reasonable medical probability, no material deterioration of the patient condition or the condition of the unborn child(leslie) is likely to result from the transfer   Reason for Transfer Level of Care needed not available at this facility   Benefits of Transfer Specialized equipment and/or services available at the receiving facility (Include comment)________________________, Continuity of care   Risks of Transfer Potential for delay in receiving treatment, Potential deterioration of medical condition, Increased discomfort during transfer, Possible worsening of condition or death during transfer   Accepting Physician Dr Juan Rodriguez Name, [facility]    (Name & Tel number) PACS   Transported by (Company and Unit #) [facility]   Sending MD Dr Tanya Echeverria   Provider Certification General risk, such as traffic hazards, adverse weather conditions, rough terrain or turbulence, possible failure of equipment (including vehicle or aircraft), or consequences of actions of persons outside the control of the transport personnel, Unanticipated needs of medical equipment and personnel during transport, Risk of worsening condition, The possibility of a transport vehicle being unavailable      RN Documentation    Flowsheet Row Most [address] [facility] Name, [facility]    (Name & Tel number) PACS   Transported by (Company and Unit #) [employer]      Follow-up Information     Follow up With Specialties Details Why [address], MD Family Medicine, Internal Medicine Schedule an appointment as soon as possible for a visit  If symptoms worsen or if no improvement [facility] [address] [place] [address]  [phone]            Discharge Medication List as of 8/30/2022  4:01 PM      CONTINUE these medications which have NOT CHANGED    Details   allopurinol (ZYLOPRIM) 100 mg tablet Take 100 mg by mouth daily, Historical Med      amLODIPine (NORVASC) 5 mg tablet Take 5 mg by mouth daily, Historical Med      aspirin (ECOTRIN LOW STRENGTH) 81 mg EC tablet Take 81 mg by mouth every other day, Historical Med      atorvastatin (LIPITOR) 10 mg tablet Take 10 mg by mouth daily, Historical Med      b complex vitamins capsule Take 1 capsule by mouth daily, Historical Med      cholecalciferol (VITAMIN D3) 1,000 units tablet Take 1,000 Units by mouth daily, Historical Med      famotidine (PEPCID) 20 mg tablet Take 20 mg by mouth 2 (two) times a day, Historical Med      furosemide (LASIX) 40 mg tablet Take 40 mg by mouth daily, Historical Med      lamoTRIgine (LaMICtal) 200 MG tablet Take 250 mg by mouth 2 (two) times a day, Historical Med      levothyroxine 50 mcg tablet Take 50 mcg by mouth daily, Historical Med      LORazepam (ATIVAN) 1 mg tablet Take 0 5 mg by mouth every 8 (eight) hours as needed for anxiety, Historical Med      losartan (COZAAR) 100 MG tablet Take 100 mg by mouth daily, Historical Med      nystatin (MYCOSTATIN) cream Apply topically as needed, Historical Med      phenytoin (DILANTIN) 100 mg ER capsule Take 100 mg by mouth daily Monday, Wednesday & Friday 100mg  Tuesday, Thursday, Saturday and Sunday- 200mg, Historical Med      potassium chloride (MICRO-K) 10 MEQ CR capsule Take 10 mEq by mouth 3 (three) times a day, Historical Med      predniSONE 1 mg tablet Take 7 5 mg by mouth daily, Historical Med             Outpatient Discharge Orders   Basic metabolic panel   Standing Status: Future Standing Exp   Date: 08/30/23       PDMP Review     None          ED Provider  Electronically Signed by           Bella Bolaños DO  08/31/22 0299

## 2022-08-31 ENCOUNTER — HOSPITAL ENCOUNTER (EMERGENCY)
Facility: HOSPITAL | Age: 77
Discharge: HOME | End: 2022-08-31
Attending: EMERGENCY MEDICINE
Payer: COMMERCIAL

## 2022-08-31 VITALS
HEART RATE: 60 BPM | WEIGHT: 142.86 LBS | OXYGEN SATURATION: 99 % | BODY MASS INDEX: 29.86 KG/M2 | SYSTOLIC BLOOD PRESSURE: 141 MMHG | DIASTOLIC BLOOD PRESSURE: 73 MMHG | RESPIRATION RATE: 18 BRPM | TEMPERATURE: 98.4 F

## 2022-08-31 DIAGNOSIS — R62.7 FAILURE TO THRIVE IN ADULT: ICD-10-CM

## 2022-08-31 DIAGNOSIS — R53.1 ASTHENIA DUE TO DISEASE: Primary | ICD-10-CM

## 2022-08-31 LAB
ANION GAP SERPL CALCULATED.3IONS-SCNC: 9 MMOL/L (ref 4–13)
ATRIAL RATE: 250 BPM
BUN SERPL-MCNC: 9 MG/DL (ref 5–25)
CALCIUM SERPL-MCNC: 8.9 MG/DL (ref 8.3–10.1)
CHLORIDE SERPL-SCNC: 103 MMOL/L (ref 96–108)
CO2 SERPL-SCNC: 30 MMOL/L (ref 21–32)
CREAT SERPL-MCNC: 1.24 MG/DL (ref 0.6–1.3)
GFR SERPL CREATININE-BSD FRML MDRD: 41 ML/MIN/1.73SQ M
GLUCOSE SERPL-MCNC: 96 MG/DL (ref 65–140)
POTASSIUM SERPL-SCNC: 4.3 MMOL/L (ref 3.5–5.3)
QRS AXIS: -15 DEGREES
QRSD INTERVAL: 154 MS
QT INTERVAL: 426 MS
QTC INTERVAL: 479 MS
SODIUM SERPL-SCNC: 142 MMOL/L (ref 135–147)
T WAVE AXIS: 163 DEGREES
VENTRICULAR RATE: 76 BPM

## 2022-08-31 PROCEDURE — 80048 BASIC METABOLIC PNL TOTAL CA: CPT | Performed by: EMERGENCY MEDICINE

## 2022-08-31 PROCEDURE — 99284 EMERGENCY DEPT VISIT MOD MDM: CPT

## 2022-08-31 PROCEDURE — 36415 COLL VENOUS BLD VENIPUNCTURE: CPT | Performed by: EMERGENCY MEDICINE

## 2022-08-31 PROCEDURE — 97167 OT EVAL HIGH COMPLEX 60 MIN: CPT

## 2022-08-31 PROCEDURE — 93005 ELECTROCARDIOGRAM TRACING: CPT

## 2022-08-31 PROCEDURE — 99285 EMERGENCY DEPT VISIT HI MDM: CPT | Performed by: EMERGENCY MEDICINE

## 2022-08-31 PROCEDURE — 93010 ELECTROCARDIOGRAM REPORT: CPT

## 2022-08-31 PROCEDURE — 97163 PT EVAL HIGH COMPLEX 45 MIN: CPT

## 2022-08-31 RX ORDER — PHENYTOIN SODIUM 100 MG/1
100 CAPSULE, EXTENDED RELEASE ORAL ONCE
Status: COMPLETED | OUTPATIENT
Start: 2022-08-31 | End: 2022-08-31

## 2022-08-31 RX ORDER — FAMOTIDINE 20 MG/1
20 TABLET, FILM COATED ORAL ONCE
Status: COMPLETED | OUTPATIENT
Start: 2022-08-31 | End: 2022-08-31

## 2022-08-31 RX ORDER — POTASSIUM CHLORIDE 20MEQ/15ML
20 LIQUID (ML) ORAL ONCE
Status: COMPLETED | OUTPATIENT
Start: 2022-08-31 | End: 2022-08-31

## 2022-08-31 RX ORDER — AMLODIPINE BESYLATE 5 MG/1
5 TABLET ORAL ONCE
Status: COMPLETED | OUTPATIENT
Start: 2022-08-31 | End: 2022-08-31

## 2022-08-31 RX ORDER — LOSARTAN POTASSIUM 50 MG/1
100 TABLET ORAL ONCE
Status: COMPLETED | OUTPATIENT
Start: 2022-08-31 | End: 2022-08-31

## 2022-08-31 RX ORDER — ATORVASTATIN CALCIUM 10 MG/1
10 TABLET, FILM COATED ORAL ONCE
Status: COMPLETED | OUTPATIENT
Start: 2022-08-31 | End: 2022-08-31

## 2022-08-31 RX ORDER — LEVOTHYROXINE SODIUM 0.05 MG/1
50 TABLET ORAL ONCE
Status: COMPLETED | OUTPATIENT
Start: 2022-08-31 | End: 2022-08-31

## 2022-08-31 RX ORDER — ALLOPURINOL 100 MG/1
100 TABLET ORAL DAILY
Status: DISCONTINUED | OUTPATIENT
Start: 2022-08-31 | End: 2022-08-31 | Stop reason: HOSPADM

## 2022-08-31 RX ADMIN — LOSARTAN POTASSIUM 100 MG: 50 TABLET, FILM COATED ORAL at 09:30

## 2022-08-31 RX ADMIN — AMLODIPINE BESYLATE 5 MG: 5 TABLET ORAL at 09:30

## 2022-08-31 RX ADMIN — POTASSIUM CHLORIDE 20 MEQ: 20 SOLUTION ORAL at 09:31

## 2022-08-31 RX ADMIN — LEVOTHYROXINE SODIUM 50 MCG: 50 TABLET ORAL at 09:30

## 2022-08-31 RX ADMIN — ATORVASTATIN CALCIUM 10 MG: 10 TABLET, FILM COATED ORAL at 09:30

## 2022-08-31 RX ADMIN — PHENYTOIN SODIUM 100 MG: 100 CAPSULE ORAL at 09:30

## 2022-08-31 RX ADMIN — FAMOTIDINE 20 MG: 20 TABLET ORAL at 09:30

## 2022-08-31 RX ADMIN — LAMOTRIGINE 250 MG: 100 TABLET ORAL at 09:42

## 2022-08-31 RX ADMIN — PREDNISONE 7.5 MG: 2.5 TABLET ORAL at 09:30

## 2022-08-31 RX ADMIN — ALLOPURINOL 100 MG: 100 TABLET ORAL at 09:30

## 2022-08-31 NOTE — DISCHARGE INSTRUCTIONS
See medications given in the ED  She can her usual nighttime meds as scheduled  Return as needed for new concerns

## 2022-08-31 NOTE — ED NOTES
Pts son rang call bell, pt removed IV herself  Bleeding controlled        Jose Poppy, RN  08/31/22 119 Countess Abdullahi, RN  08/31/22 1052

## 2022-08-31 NOTE — PHYSICAL THERAPY NOTE
PHYSICAL THERAPY EVALUATION          Patient Name: Giorgio SOTO Date: 8/31/2022  PT EVALUATION    76 y o     [id number]    Weakness [R53 1]    Past Medical History:   Diagnosis Date    Disease of thyroid gland     Hypertension     Seizure ([facility] [place] [address] )     Stroke [facility])      History reviewed  No pertinent surgical history  08/31/22 1136   PT Last Visit   PT Visit Date 08/31/22   Note Type   Note type Evaluation   Pain Assessment   Pain Assessment Tool FLACC   Pain Rating: FLACC (Rest) - Face 0   Pain Rating: FLACC (Rest) - Legs 0   Pain Rating: FLACC (Rest) - Activity 0   Pain Rating: FLACC (Rest) - Cry 0   Pain Rating: FLACC (Rest) - Consolability 0   Score: FLACC (Rest) 0   Pain Rating: FLACC (Activity) - Face 0   Pain Rating: FLACC (Activity) - Legs 0   Pain Rating: FLACC (Activity) - Activity 0   Pain Rating: FLACC (Activity) - Cry 0   Pain Rating: FLACC (Activity) - Consolability 0   Score: FLACC (Activity) 0   Restrictions/Precautions   Other Precautions Cognitive; Chair Alarm; Bed Alarm; Fall Risk   Home Living   Type of [address] [place] [address] Two level;Performs ADLs on one level;Stairs to enter without rails   [address];Cane;Wheelchair-manual   Additional Comments a few RIO  pt stays on first floor, sleeping on sofa w BSC use  FOS bed/bathroom  has been restricted to first floor for three weeks since fall  is home bound prior   Prior Function   Level of [place] Needs assistance with ADLs and functional mobility; Needs assistance with IADLs   Lives With Son   Receives Help From Family;Home health  (son  Kee Escobar PT x1/wk)   ADL Assistance Needs assistance   IADLs Needs assistance   Falls in the last 6 months 1 to 4   Vocational Retired   Comments per son, pt gets assist for ADLs (sponge bathing for 3 weeks) and IADLs  requires assist to perform stand piv transf to [facility] (without use of DME)   son reports prior to fall three weeks ago was going up/down steps   General   Additional Pertinent History pt admitted 8/31/22  PMHx significant for stroke, seizure   Family/Caregiver Present Yes   Cognition   Overall Cognitive Status Impaired   Arousal/Participation Responsive   Attention Attends with cues to redirect   Orientation Level Oriented to person;Disoriented to place; Disoriented to time;Disoriented to situation   Memory Decreased recall of precautions;Decreased recall of recent events;Decreased short term memory   Following Commands Follows one step commands with increased time or repetition   Comments per son, at baseline is Ax1-2 (place)  hx dementia   Subjective   Subjective "pants"   RLE Assessment   RLE Assessment X  (no active movement noted however able to WB)   LLE Assessment   LLE Assessment X   Bed Mobility   Supine to Sit 2  Maximal assistance   Additional items Assist x 2; Increased time required;Verbal cues;LE management; Other   Sit to Supine 1  Dependent   Additional items Assist x 2; Increased time required;LE management; Other   Additional Comments assist for BLE and trunk   Transfers   Sit to Stand 2  Maximal assistance   Additional items Assist x 2; Increased time required;Verbal cues; Other   Stand to Sit 3  Moderate assistance   Additional items Assist x 2; Increased time required;Verbal cues; Other   Additional Comments bl HHA   Ambulation/Elevation   Gait pattern Not appropriate  (given poor standing balance, weakness, cognition)   Balance   Static Sitting Poor +   Static Standing Zero  (Ax2)   Activity Tolerance   Activity Tolerance Treatment limited secondary to medical complications (Comment)  (cognition, baseline weakness)   Medical Staff Made Aware [address] OT; Esteban    Nurse Made Aware cleared for therapy   Assessment   Prognosis Guarded   Problem List Decreased strength; Impaired balance;Decreased mobility; Decreased cognition;Obesity   Assessment Tory Glover is a 76 y o  female admitted to [facility] on 8/31/2022 for <principal problem not specified>  PT was consulted and pt was seen on 8/31/2022 for mobility assessment and d/c planning  Pt presents w fall risk  Per son, had a fall three weeks ago resulting in functional decline  She was previously going up/down steps to bedroom and bathroom  Currently staying on first floor and sleeping on couch  Son is sole caregiver  Pt is currently functioning at a max-dep Ax2 for bed mobility, mod-max Ax2 for transfers  Pt demonstrated deficits of cognition, balance, strength  Requires Ax2 to safely complete mobility tasks  Does not demonstrate adequate standing balance or strength to perform pivot transf  Would recommend quick move OOB prn  Pt will benefit from continued skilled IP PT to address the above mentioned impairments  in order to maximize recovery and increase functional independence when completing mobility and ADLs  CAt this time PT recommendations for d/c are STR  Pending progress may benefit from transition to LTC facility given caregiver burden at current LOF  Barriers to Discharge Decreased caregiver support   Barriers to Discharge Comments caregiver burden   Goals   Patient Goals none stated   STG Expiration Date 09/14/22   Short Term Goal #1 1)  Pt will perform bed mobility with max Ax1 demonstrating appropriate technique 100% of the time in order to improve function  2)  Perform all transfers with max Ax1 demonstrating safe and appropriate technique 100% of the time in order to improve ability to negotiate safely in home environment  3) Amb with least restrictive AD > 10'x1 with mod Ax2 in order to demonstrate ability to negotiate in home environment  4)  Improve overall strength and balance 1/2 grade in order to optimize ability to perform functional tasks and reduce fall risk  5) Increase activity tolerance to 45 minutes in order to improve endurance to functional tasks  6) PT for ongoing patient and family/caregiver education, DME needs and d/c planning in order to promote highest level of function in least restrictive environment  Plan   Treatment/Interventions Functional transfer training;LE strengthening/ROM; Therapeutic exercise;Cognitive reorientation;Patient/family training;Bed mobility; Equipment eval/education;Gait training; Compensatory technique education;Continued evaluation;Spoke to nursing;OT;Spoke to case management; Family   PT Frequency 3-5x/wk   Recommendation   PT Discharge Recommendation Post acute rehabilitation services   AM-PAC Basic Mobility Inpatient   Turning in Bed Without Bedrails 1   Lying on Back to Sitting on Edge of Flat Bed 1   Moving Bed to Chair 1   Standing Up From Chair 1   Walk in Room 1   Climb 3-5 Stairs 1   Basic Mobility Inpatient Raw Score 6   Turning Head Towards Sound 3   Follow Simple Instructions 2   Low Function Basic Mobility Raw Score 11   Low Function Basic Mobility Standardized Score 16 55   Highest Level Of Mobility   JH-HLM Goal 2: Bed activities/Dependent transfer   JH-HLM Achieved 3: Sit at edge of bed   End of Consult   Patient Position at End of Consult Supine; All needs within reach   History: co - morbidities, social background, fall risk, assist for adl's, cognition  Exam: impairments in systems including musculoskeletal (strength), neuromuscular (balance, transfers, motor function), AM-PAC, cognition  Clinical: unstable/unpredictable  Complexity:high      Mariaelena Standard, PT

## 2022-08-31 NOTE — EMTALA/ACUTE CARE TRANSFER
[facility]  [address] [facility] [address]  Dept: [phone]      EMTALA TRANSFER CONSENT    NAME Lillie Cruz                                         1945                              MRN [id number]    I have been informed of my rights regarding examination, treatment, and transfer   by Dr Wicho Blandon MD    Benefits: Specialized equipment and/or services available at the receiving facility (Include comment)________________________, Continuity of care    Risks: Potential for delay in receiving treatment, Potential deterioration of medical condition, Increased discomfort during transfer, Possible worsening of condition or death during transfer      Consent for Transfer:  I acknowledge that my medical condition has been evaluated and explained to me by the emergency department physician or other qualified medical person and/or my attending physician, who has recommended that I be transferred to the service of  Accepting Physician: Dr Allen Bloom at [address] [facility] Name, [address] : [facility]  The above potential benefits of such transfer, the potential risks associated with such transfer, and the probable risks of not being transferred have been explained to me, and I fully understand them  The doctor has explained that, in my case, the benefits of transfer outweigh the risks  I agree to be transferred  I authorize the performance of emergency medical procedures and treatments upon me in both transit and upon arrival at the receiving facility  Additionally, I authorize the release of any and all medical records to the receiving facility and request they be transported with me, if possible  I understand that the safest mode of transportation during a medical emergency is an ambulance and that the Hospital advocates the use of this mode of transport   Risks of traveling to the receiving facility by car, including absence of medical control, life sustaining equipment, such as oxygen, and medical personnel has been explained to me and I fully understand them  (ELINOR CORRECT BOX BELOW)  [  ]  I consent to the stated transfer and to be transported by ambulance/helicopter  [  ]  I consent to the stated transfer, but refuse transportation by ambulance and accept full responsibility for my transportation by car  I understand the risks of non-ambulance transfers and I exonerate the Hospital and its staff from any deterioration in my condition that results from this refusal     X___________________________________________    DATE  22  TIME________  Signature of patient or legally responsible individual signing on patient behalf           RELATIONSHIP TO PATIENT_________________________          Provider Certification    NAME Shilpa Mckinney                                         1945                              MRN [id number]    A medical screening exam was performed on the above named patient  Based on the examination:    Condition Necessitating Transfer The primary encounter diagnosis was Dizziness  A diagnosis of Hypokalemia was also pertinent to this visit      Patient Condition: The patient has been stabilized such that within reasonable medical probability, no material deterioration of the patient condition or the condition of the unborn child(leslie) is likely to result from the transfer    Reason for Transfer: Level of Care needed not available at this facility    Transfer Requirements: Facility [facility]   · Space available and qualified personnel available for treatment as acknowledged by PACS  · Agreed to accept transfer and to provide appropriate medical treatment as acknowledged by       Dr Heavenly Larson  · Appropriate medical records of the examination and treatment of the patient are provided at the time of transfer   [address] _______  · Transfer will be performed [place] [facility]  and appropriate transfer equipment as required, including the use of necessary and appropriate life support measures  Provider Certification: I have examined the patient and explained the following risks and benefits of being transferred/refusing transfer to the patient/family:  General risk, such as traffic hazards, adverse weather conditions, rough terrain or turbulence, possible failure of equipment (including vehicle or aircraft), or consequences of actions of persons outside the control of the transport personnel, Unanticipated needs of medical equipment and personnel during transport, Risk of worsening condition, The possibility of a transport vehicle being unavailable      Based on these reasonable risks and benefits to the patient and/or the unborn child(leslie), and based upon the information available at the time of the patients examination, I certify that the medical benefits reasonably to be expected from the provision of appropriate medical treatments at another medical facility outweigh the increasing risks, if any, to the individuals medical condition, and in the case of labor to the unborn child, from effecting the transfer      X____________________________________________ DATE 08/30/22        TIME_______      ORIGINAL - SEND TO MEDICAL RECORDS   COPY - SEND WITH PATIENT DURING TRANSFER

## 2022-08-31 NOTE — ED PROVIDER NOTES
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, brought by ambulance from home where she resides with her son who is her main caregiver  Transport possible c/o pain and he said she was indicating shortness of breath  She was just discharged from the ED at 21:45 by [facility] transport after being evaluated yesterday, and today marks her 4th ED visit this month for evaluations initiated by her son for issues associated with her chronic disabilities, and his apparent concerns  She does not appear uncomfortable, and is not in distress  She did follow some commands, but is not oriented to place, no giving any understandable complaint  I reviewed the workup from yesterday, and it came down to her chronic deficits, d/w son for her care needs at home, and decision made to discharge her again, with encouragement for him to seek additional help or resources, or placement for her  Paramedics did not report any abnormal vital signs, she is alert, VSS in ED, deficits at their recorded baseline  Her son arrived in the ED, and affirmed she was transported home about 22:00  He assisted her into her nightgown, and she was on the sofa where stays because she can't get up the stairs  He said she was awake most of the night, and it was this morning that she c/o "pain" and asked to be taken back to the ED  I asked him how he was doing at home, and he affirmed it is difficult to manage her medical condition, but he is hoping she can get better and "we can change what we are doing now "  I assured him her VS are normal, she has no concerning new physical findings, so multiple ED tests are not indicated, but I am going to recheck her electrolytes  Then we can consider what her additional needs are today  History limited by:  Dementia      Prior to Admission Medications   Prescriptions Last Dose Informant Patient Reported? Taking?    LORazepam (ATIVAN) 1 mg tablet 8/30/2022 at Unknown time  Yes Yes   Sig: Take 0 5 mg by mouth every 8 (eight) hours as needed for anxiety   allopurinol (ZYLOPRIM) 100 mg tablet 8/30/2022 at Unknown time  Yes Yes   Sig: Take 100 mg by mouth daily   amLODIPine (NORVASC) 5 mg tablet 8/30/2022 at Unknown time  Yes Yes   Sig: Take 5 mg by mouth daily   aspirin (ECOTRIN LOW STRENGTH) 81 mg EC tablet 8/30/2022 at Unknown time  Yes Yes   Sig: Take 81 mg by mouth every other day   atorvastatin (LIPITOR) 10 mg tablet 8/30/2022 at Unknown time  Yes Yes   Sig: Take 10 mg by mouth daily   b complex vitamins capsule 8/30/2022 at Unknown time  Yes Yes   Sig: Take 1 capsule by mouth daily   cholecalciferol (VITAMIN D3) 1,000 units tablet 8/30/2022 at Unknown time  Yes Yes   Sig: Take 1,000 Units by mouth daily   famotidine (PEPCID) 20 mg tablet 8/30/2022 at Unknown time  Yes Yes   Sig: Take 20 mg by mouth 2 (two) times a day   furosemide (LASIX) 40 mg tablet 8/30/2022 at Unknown time  Yes Yes   Sig: Take 40 mg by mouth daily   lamoTRIgine (LaMICtal) 200 MG tablet 8/30/2022 at Unknown time  Yes Yes   Sig: Take 250 mg by mouth 2 (two) times a day   levothyroxine 50 mcg tablet 8/30/2022 at Unknown time  Yes Yes   Sig: Take 50 mcg by mouth daily   losartan (COZAAR) 100 MG tablet 8/30/2022 at Unknown time  Yes Yes   Sig: Take 100 mg by mouth daily   nystatin (MYCOSTATIN) cream 8/30/2022 at Unknown time  Yes Yes   Sig: Apply topically as needed   phenytoin (DILANTIN) 100 mg ER capsule 8/30/2022 at Unknown time  Yes Yes   Sig: Take 100 mg by mouth daily Monday, Wednesday & Friday 100mg  Tuesday, Thursday, Saturday and Sunday- 200mg   potassium chloride (MICRO-K) 10 MEQ CR capsule 8/30/2022 at Unknown time  Yes Yes   Sig: Take 10 mEq by mouth 3 (three) times a day   predniSONE 1 mg tablet 8/30/2022 at Unknown time  Yes Yes   Sig: Take 7 5 mg by mouth daily      Facility-Administered Medications: None       Past Medical History:   Diagnosis Date    Disease of thyroid gland     Hypertension     Seizure ([address] )     Stroke [facility])        History reviewed  No pertinent surgical history  History reviewed  No pertinent family history  I have reviewed and agree with the history as documented  E-Cigarette/Vaping    E-Cigarette Use Never User      E-Cigarette/Vaping Substances    Nicotine No     THC No     CBD No     Flavoring No     Other No     Unknown No      Social History     Tobacco Use    Smoking status: Never Smoker    Smokeless tobacco: Never Used   Vaping Use    Vaping Use: Never used   Substance Use Topics    Alcohol use: Never    Drug use: Never       Review of Systems   Unable to perform ROS: Dementia       Physical Exam  Physical Exam  Vitals and nursing note reviewed  Constitutional:       General: She is not in acute distress  Appearance: She is not diaphoretic  HENT:      Head: Normocephalic and atraumatic  Right Ear: External ear normal       Left Ear: External ear normal       Nose: Nose normal    Eyes:      Conjunctiva/sclera: Conjunctivae normal       Pupils: Pupils are equal, round, and reactive to light  Cardiovascular:      Rate and Rhythm: Normal rate and regular rhythm  Pulmonary:      Effort: Pulmonary effort is normal    Abdominal:      Palpations: Abdomen is soft  Musculoskeletal:         General: Normal range of motion  Cervical back: Normal range of motion and neck supple  Skin:     General: Skin is warm and dry  Capillary Refill: Capillary refill takes less than 2 seconds  Findings: No rash  Neurological:      Mental Status: She is alert  Mental status is at baseline  Cranial Nerves: Dysarthria (according to baseline) present  Motor: No weakness or tremor        Comments: Moves all 4 extremities purposefully, RUE is weak, according to baseline   Psychiatric:         Speech: Speech is delayed and slurred  Behavior: Behavior is slowed           Vital Signs  ED Triage Vitals   Temperature Pulse Respirations Blood Pressure SpO2   08/31/22 0732 08/31/22 0732 08/31/22 0945 08/31/22 0732 08/31/22 0732   98 4 °F (36 9 °C) 94 20 137/93 98 %      Temp src Heart Rate Source Patient Position - Orthostatic VS BP Location FiO2 (%)   -- 08/31/22 0945 08/31/22 0732 08/31/22 0732 --    Monitor Lying Right arm       Pain Score       08/31/22 0945       No Pain           Vitals:    08/31/22 1219 08/31/22 1400 08/31/22 1427 08/31/22 1714   BP: 138/62  122/60 141/73   Pulse: 60 72  60   Patient Position - Orthostatic VS: Lying   Lying         Visual Acuity      ED Medications  Medications   lamoTRIgine (LaMICtal) tablet 250 mg (250 mg Oral Given 8/31/22 0942)   amLODIPine (NORVASC) tablet 5 mg (5 mg Oral Given 8/31/22 0930)   atorvastatin (LIPITOR) tablet 10 mg (10 mg Oral Given 8/31/22 0930)   famotidine (PEPCID) tablet 20 mg (20 mg Oral Given 8/31/22 0930)   phenytoin (DILANTIN) ER capsule 100 mg (100 mg Oral Given 8/31/22 0930)   levothyroxine tablet 50 mcg (50 mcg Oral Given 8/31/22 0930)   potassium chloride oral solution 20 mEq (20 mEq Oral Given 8/31/22 0931)   losartan (COZAAR) tablet 100 mg (100 mg Oral Given 8/31/22 0930)   predniSONE tablet 7 5 mg (7 5 mg Oral Given 8/31/22 0930)       Diagnostic Studies  Results Reviewed     Procedure Component Value Units Date/Time    Basic metabolic panel [[id number]] Collected: 08/31/22 2913    Lab Status: Final result Specimen: Blood from Arm, Left Updated: 08/31/22 0857     Sodium 142 mmol/L      Potassium 4 3 mmol/L      Chloride 103 mmol/L      CO2 30 mmol/L      ANION GAP 9 mmol/L      BUN 9 mg/dL      Creatinine 1 24 mg/dL      Glucose 96 mg/dL      Calcium 8 9 mg/dL      eGFR 41 ml/min/1 73sq m     Narrative:      [place] guidelines for Chronic Kidney Disease (CKD):   Stage 1 with normal or high GFR (GFR > 90 mL/min/1 73 square meters)    Stage 2 Mild CKD (GFR = 60-89 mL/min/1 73 square meters)    Stage 3A Moderate CKD (GFR = 45-59 mL/min/1 73 square meters)    Stage 3B Moderate CKD (GFR = 30-44 mL/min/1 73 square meters)    Stage 4 Severe CKD (GFR = 15-29 mL/min/1 73 square meters)    Stage 5 End Stage CKD (GFR <15 mL/min/1 73 square meters)  Note: GFR calculation is accurate only with a steady state creatinine                 No orders to display              Procedures  ECG 12 Lead Documentation Only    Date/Time: 8/31/2022 7:41 AM  Performed by: Red Oconnor MD  Authorized by: Red Oconnor MD     Previous ECG:     Previous ECG:  Compared to current    Similarity:  No change  Rate:     ECG rate:  76  Rhythm:     Rhythm: atrial fibrillation    Conduction:     Conduction: abnormal      Abnormal conduction: complete LBBB               ED Course  ED Course as of 09/01/22 0903   Wed Aug 31, 2022   0928 Reviewed results with patient and her son who  makes her medical decisions at bedside and updated on the plan  Her labs are reassuring  [facility], no acute findings  He indicated his issue is reachign the point that he is concerned about continuing to manage her care at home, although at this point he is doing well  He would like to see what options are available through case management  [address] [place] [address] Barbara, has been working through the day for facilities, but she has not been accepted   In addition, she does not have COVID Vaccine which is required at facilities  Her son indicated he was not comfortable getting it yet  I had a discussion with him myself and gave him reassurance about the side effect profile of COVID vaccines, so that he can make an informed decision about it, since being unvaccinated it a barrier to qualifying for a facility    Finally, he is managing her care at home, and she is well cared for, it has just become a burden that he is hoping can be mitigated  However, it is still his goal for her to be at his home long term  For today, we have exhausted the resources and she is being discharged back home  SBIRT 20yo+    Flowsheet Row Most Recent Value   SBIRT (25 yo +)    In order to provide better care to our patients, we are screening all of our patients for alcohol and drug use  Would it be okay to ask you these screening questions? Unable to answer at this time Filed at: 08/31/2022 6760                    MDM    Disposition  Final diagnoses:   Asthenia due to disease   Failure to thrive in adult     Time reflects when diagnosis was documented in both MDM as applicable and the Disposition within this note     Time User Action Codes Description Comment    8/31/2022 10:08 AM Jory Dux L Add [R53 1] Asthenia due to disease     8/31/2022 10:08 AM Jory Dux L Add [R62 51] Failure to thrive (child)     8/31/2022 10:08 AM Jory Dux L Remove [R62 51] Failure to thrive (child)     8/31/2022 10:08 AM Evelyne Oar Add [R62 7] Failure to thrive in adult       ED Disposition     ED Disposition   Discharge    Condition   Good    Date/Time   Wed Aug 31, 2022  3:50 PM    Comment   Dannielle Tomas discharge to home/self care                 Follow-up Information     Follow up With Specialties Details Why Contact Info    Imedla Bonilla MD Family Medicine, Internal Medicine Call  For followup [facility] [address] [facility]  [phone]            Discharge Medication List as of 8/31/2022  3:50 PM      CONTINUE these medications which have NOT CHANGED    Details   allopurinol (ZYLOPRIM) 100 mg tablet Take 100 mg by mouth daily, Historical Med      amLODIPine (NORVASC) 5 mg tablet Take 5 mg by mouth daily, Historical Med      aspirin (ECOTRIN LOW STRENGTH) 81 mg EC tablet Take 81 mg by mouth every other day, Historical Med      atorvastatin (LIPITOR) 10 mg tablet Take 10 mg by mouth daily, Historical Med      b complex vitamins capsule Take 1 capsule by mouth daily, Historical Med      cholecalciferol (VITAMIN D3) 1,000 units tablet Take 1,000 Units by mouth daily, Historical Med      famotidine (PEPCID) 20 mg tablet Take 20 mg by mouth 2 (two) times a day, Historical Med      furosemide (LASIX) 40 mg tablet Take 40 mg by mouth daily, Historical Med      lamoTRIgine (LaMICtal) 200 MG tablet Take 250 mg by mouth 2 (two) times a day, Historical Med      levothyroxine 50 mcg tablet Take 50 mcg by mouth daily, Historical Med      LORazepam (ATIVAN) 1 mg tablet Take 0 5 mg by mouth every 8 (eight) hours as needed for anxiety, Historical Med      losartan (COZAAR) 100 MG tablet Take 100 mg by mouth daily, Historical Med      nystatin (MYCOSTATIN) cream Apply topically as needed, Historical Med      phenytoin (DILANTIN) 100 mg ER capsule Take 100 mg by mouth daily Monday, Wednesday & Friday 100mg  Tuesday, Thursday, Saturday and Sunday- 200mg, Historical Med      potassium chloride (MICRO-K) 10 MEQ CR capsule Take 10 mEq by mouth 3 (three) times a day, Historical Med      predniSONE 1 mg tablet Take 7 5 mg by mouth daily, Historical Med             No discharge procedures on file      PDMP Review     None          ED Provider  Electronically Signed by           Shama Sarmiento MD  09/01/22 8044

## 2022-08-31 NOTE — PLAN OF CARE
Problem: PHYSICAL THERAPY ADULT  Goal: Performs mobility at highest level of function for planned discharge setting  See evaluation for individualized goals  Description: Treatment/Interventions: Functional transfer training, LE strengthening/ROM, Therapeutic exercise, Cognitive reorientation, Patient/family training, Bed mobility, Equipment eval/education, Gait training, Compensatory technique education, Continued evaluation, Spoke to nursing, OT, Spoke to case management, Family          See flowsheet documentation for full assessment, interventions and recommendations  8/31/2022 1243 by Candance Gone, PT  Note: Prognosis: Guarded  Problem List: Decreased strength, Impaired balance, Decreased mobility, Decreased cognition, Obesity  Assessment: Leopoldo Lazar is a 76 y o  female admitted to [facility] on 8/31/2022 for <principal problem not specified>  PT was consulted and pt was seen on 8/31/2022 for mobility assessment and d/c planning  Pt presents w fall risk  Per son, had a fall three weeks ago resulting in functional decline  She was previously going up/down steps to bedroom and bathroom  Currently staying on first floor and sleeping on couch  Son is sole caregiver  Pt is currently functioning at a max-dep Ax2 for bed mobility, mod-max Ax2 for transfers  Pt demonstrated deficits of cognition, balance, strength  Requires Ax2 to safely complete mobility tasks  Does not demonstrate adequate standing balance or strength to perform pivot transf  Would recommend quick move OOB prn  Pt will benefit from continued skilled IP PT to address the above mentioned impairments  in order to maximize recovery and increase functional independence when completing mobility and ADLs  CAt this time PT recommendations for d/c are STR  Pending progress may benefit from transition to LTC facility given caregiver burden at current LOF    Barriers to Discharge: Decreased caregiver support  Barriers to Discharge Comments: caregiver burden  PT Discharge Recommendation: Post acute rehabilitation services    See flowsheet documentation for full assessment  8/31/2022 1243 by Ajay Sarmiento PT  Note: Prognosis: Guarded  Problem List: Decreased strength, Impaired balance, Decreased mobility, Decreased cognition, Obesity  Assessment: Moody Crain is a 76 y o  female admitted to [facility] on 8/31/2022 for <principal problem not specified>  PT was consulted and pt was seen on 8/31/2022 for mobility assessment and d/c planning  Pt presents w fall risk  Per son, had a fall three weeks ago resulting in functional decline  She was previously going up/down steps to bedroom and bathroom  Currently staying on first floor and sleeping on couch  Son is sole caregiver  Pt is currently functioning at a max-dep Ax2 for bed mobility, mod-max Ax2 for transfers  Pt demonstrated deficits of cognition, balance, strength  Requires Ax2 to safely complete mobility tasks  Does not demonstrate adequate standing balance or strength to perform pivot transf  Would recommend quick move OOB prn  Pt will benefit from continued skilled IP PT to address the above mentioned impairments  in order to maximize recovery and increase functional independence when completing mobility and ADLs  CAt this time PT recommendations for d/c are STR  Pending progress may benefit from transition to LTC facility given caregiver burden at current LOF  Barriers to Discharge: Decreased caregiver support  Barriers to Discharge Comments: caregiver burden  PT Discharge Recommendation: Post acute rehabilitation services    See flowsheet documentation for full assessment

## 2022-08-31 NOTE — CASE MANAGEMENT
Case Management ED Discharge Planning Note    Patient name Leopoldo Lazar  Location ED 22/ED 22 MRN [id number]  : 1945 Date 2022        OBJECTIVE:  Predictive Model Details         61% Factor Value    Risk of Hospital Admission or ED Visit Model Number of ED Visits 4     Is in Relationship No     Has Atrial Fibrillation Yes     Has CVD Yes     Has CHF Yes     Has PCP Yes            Chief Complaint: Weakness     Patient Class: Emergency  Preferred Pharmacy:   [id number], [place] [address],[address],  [place] [address]  Phone: [phone] Fax: [phone]    Primary Care Provider: Ashley Martin MD    Primary Insurance: [facility]  Secondary Insurance:     ED Discharge Details:    Discharge planning discussed with[de-identified] Luly Anne / Silvino Gordillo patient's son  Freedom of Choice: Yes                                  Other Referral/Resources/Interventions Provided:  Interventions: SNF, PCP

## 2022-08-31 NOTE — ED NOTES
P/u [id number] via [facility], [address] [place] [address]  08/30/22 9274

## 2022-08-31 NOTE — PLAN OF CARE
Problem: OCCUPATIONAL THERAPY ADULT  Goal: Performs self-care activities at highest level of function for planned discharge setting  See evaluation for individualized goals  Description: Treatment Interventions: ADL retraining, Functional transfer training, UE strengthening/ROM, Cognitive reorientation, Patient/family training, Equipment evaluation/education          See flowsheet documentation for full assessment, interventions and recommendations  Outcome: Progressing  Note: Limitation: Decreased ADL status, Decreased UE ROM, Decreased UE strength, Decreased cognition, Decreased endurance, Decreased fine motor control     Assessment: Pt is a 76 y o  female seen for OT evaluation s/p admit to [facility] on 8/31/2022 w/ c/o weakness and pain  Comorbidities affecting pt's functional performance at time of assessment include: HTN, obesity, limited communication, dementia and hx of ICH with residual R sided weakness, neurosarcoidosis  Personal factors affecting pt at time of IE include:steps to enter environment, difficulty performing ADLS, difficulty performing IADLS , limited insight into deficits and decreased initiation and engagement   Prior to admission, pt was needing assistance with most ADLs and all functional mobility since fall 3 weeks ago  Upon evaluation: Pt requires Maximal Assistance x2 person assist for mobility and Maximal Assistance to total assistance for ADLs 2* the following deficits impacting occupational performance: weakness, decreased ROM, decreased strength, decreased balance, impaired [address], impaired memory, impaired sequencing, impaired problem solving and abnormal tone  Pt to benefit from continued skilled OT tx while in the hospital to address deficits as defined above and maximize level of functional independence w ADL's and functional mobility  Occupational Performance areas to address include: eating, grooming, bathing/shower, toilet hygiene, dressing and functional mobility   From OT standpoint, recommendation at time of d/c would be inpatient rehab (subacute level) given her rapid decreased in functional status since mechanical fall and pt has one primary caregiver and is currently requiring assist x2    Recommendation: Geriatric Consult  OT Discharge Recommendation: Post acute rehabilitation services (subacute rehab)       Orin Rey, OTR/L

## 2022-08-31 NOTE — CASE MANAGEMENT
Case Management ED Progress Note    Patient name Nesha Alvares  Location ED 22/ED 22 MRN [id number]  : 1945 Date 2022        OBJECTIVE:  Predictive Model Details         61% Factor Value    Risk of Hospital Admission or ED Visit Model Number of ED Visits 4     Is in Relationship No     Has Atrial Fibrillation Yes     Has CVD Yes     Has CHF Yes     Has PCP Yes            Chief Complaint: Weakness   Patient Class: Emergency  Preferred Pharmacy:   [facility] # [id number], [place] [address],, [address],  [place]  [address] [address]  Phone: [phone] Fax: [phone]    Primary Care Provider: Ciera Cardozo MD    Primary Insurance: [facility]  Secondary Insurance:     ED Progress Note:    Patient evaluated by PT OT, recommendations for sub acute reg=hab  Referrals to multiple providers completed via [address],  Patient is not vaccinated, and to provider able to accept her as a result of no quarantine beds available  Case consulted with patient's son Melanie Bowman who was a little bit reluctant with the vaccine and side effects  This worker contact [facility] and informed by [employer] they have beds available but they will need [address] and one time agreement for admission with no covid vaccine   Shanta Lu was contact leaving message regarding this issue  Attending contact patient's son and at this time is for patient to be discharge home, and for family to follow up with patient's PCP and placement to a SNF  Patient's son informed to contact this worker for any assistance  Assigned RN informed

## 2022-08-31 NOTE — OCCUPATIONAL THERAPY NOTE
Occupational Therapy Evaluation     Patient Name: Darline Casas  [id number] Date: 8/31/2022    Problem List  Active Problems:    * No active hospital problems  *    Past Medical History  Past Medical History:   Diagnosis Date    Disease of thyroid gland     Hypertension     Seizure ([address] )     Stroke [facility])      Past Surgical History  History reviewed  No pertinent surgical history  08/31/22 1112   OT Last Visit   OT Visit Date 08/31/22   Note Type   Note type Evaluation   Restrictions/Precautions   Weight Bearing Precautions Per Order No   Other Precautions Cognitive; Chair Alarm; Bed Alarm;Limb alert; Fall Risk  (R sided hemiparesis)   Pain Assessment   Pain Assessment Tool FLACC   Pain Score No Pain   Pain Rating: FLACC (Rest) - Face 0   Pain Rating: FLACC (Rest) - Legs 0   Pain Rating: FLACC (Rest) - Activity 0   Pain Rating: FLACC (Rest) - Cry 0   Pain Rating: FLACC (Rest) - Consolability 0   Score: FLACC (Rest) 0   Pain Rating: FLACC (Activity) - Face 0   Pain Rating: FLACC (Activity) - Legs 0   Pain Rating: FLACC (Activity) - Activity 0   Pain Rating: FLACC (Activity) - Cry 0   Pain Rating: FLACC (Activity) - Consolability 0   Score: FLACC (Activity) 0   Home Living   Type of Home House   Home Layout Two level;Bed/bath upstairs;Stairs to enter with rails  (First floor set up since fall ~3 weeks ago  No bathroom on first floor, pt sleeping on couch)   Bathroom Shower/Tub Walk-in shower  (per pt's son, pt has been assisted with sponge bathing in recent weeks)   [address] Walker;Cane;Wheelchair-manual   Prior Function   Level of [place] Needs assistance with ADLs and functional mobility   Lives With Son   Receives Help From Family  (Son is primary caregiver)   ADL Assistance Needs assistance   IADLs Needs assistance   Falls in the last 6 months 1 to 4  (at least 1 fall ~3 weeks ago  Pt fell backward and hit back of head)   Comments Pt unable to provide history 2/2 dementia  Pt's son present during session to provide the above  Pt requires assistance with all ADLs and all mobility since her fall  Currently stand pivot transfer with 1 person assist to/from [facility]  Prior to that she was able to perform stairs to upper level with assistance  She is able to perform light grooming and self feeding using LUE   Psychosocial   Psychosocial (WDL) X   Patient Behaviors/Mood Anxious;Calm; Cooperative   Subjective   Subjective Pt's son present and agreeable to therapy evaluation   ADL   Eating Assistance 3  Moderate Assistance   Grooming Assistance 3  Moderate Assistance   UB Bathing Assistance 2  Maximal Assistance   LB Bathing Assistance 1  Total Assistance   [address] 2  Maximal Assistance   UB Dressing Deficit Thread RUE; Thread LUE; Increased time to complete   LB Dressing Assistance 1  Total Assistance   LB Dressing Deficit Don/doff R sock; Don/doff L sock   Toileting Assistance  1  Total Assistance   Bed Mobility   Supine to Sit 2  Maximal assistance   Additional items Assist x 2;HOB elevated; Increased time required;Verbal cues;LE management   Sit to Supine 1  Dependent   Additional items Assist x 1;Verbal cues;LE management; Increased time required   Transfers   Sit to Stand 2  Maximal assistance   Additional items Assist x 2; Increased time required;Verbal cues   Stand to Sit 3  Moderate assistance   Additional items Assist x 2; Increased time required;Verbal cues   Stand pivot Unable to assess   Functional Mobility   Functional Mobility 2  Maximal assistance   Additional Comments assist x2   Activity Tolerance   Activity Tolerance Patient limited by fatigue;Treatment limited secondary to medical complications (Comment)  (limited cognition)   Medical Staff Made Aware Terri Vaughn PT, Esteban KHAN  Pt seen for co-evaluation with Physical Therapist due to pt's medical complexity, functional limitations and limited activity tolerance      Nurse Made Aware Iqra PRADO   RUE Assessment   RUE Assessment (0/5, no active movement noted  Mild hypertonia noted with PROM)   LUE Assessment   LUE Assessment   (Grossly 3+/5 throughout  Pt able to lift against gravity, but significant generalized weakness noted)   Hand Function   Gross Motor Coordination Functional  (in LUE)   Fine Motor Coordination Impaired  (bilaterally R>>L)   Sensation   Light Touch   (When asked, pt indicated she could feel light touch in RUE)   Vision-Basic Assessment   Current Vision Wears glasses all the time   Vision - Complex Assessment   Acuity   (Pt able to visually locate clock on the wall and state number (suspect inability to read clock related to cognition rather than vision)  Pt able to read writer's digital wrist watch with increased time)   Cognition   Overall Cognitive Status Impaired   Arousal/Participation Cooperative; Alert   Attention Attends with cues to redirect   Orientation Level Oriented to person  (intermittently to hospital with choices, ~50% of the time  Pt's son reports she recognizes when she's home and himself)   Memory Decreased long term memory;Decreased recall of biographical information;Decreased short term memory   Following Commands Follows one step commands with increased time or repetition   Comments Pt with baseline dementia  Per son, she was previously able to perform basic rouine ADLs   Assessment   Limitation Decreased ADL status; Decreased UE ROM; Decreased UE strength;Decreased cognition;Decreased endurance;Decreased fine motor control   Assessment Pt is a 76 y o  female seen for OT evaluation s/p admit to [facility] on 8/31/2022 w/ c/o weakness and pain  Comorbidities affecting pt's functional performance at time of assessment include: HTN, obesity, limited communication, dementia and hx of ICH with residual R sided weakness, neurosarcoidosis   Personal factors affecting pt at time of IE include:steps to enter environment, difficulty performing ADLS, difficulty performing IADLS , limited insight into deficits and decreased initiation and engagement   Prior to admission, pt was needing assistance with most ADLs and all functional mobility since fall 3 weeks ago  Upon evaluation: Pt requires Maximal Assistance x2 person assist for mobility and Maximal Assistance to total assistance for ADLs 2* the following deficits impacting occupational performance: weakness, decreased ROM, decreased strength, decreased balance, impaired [address], impaired memory, impaired sequencing, impaired problem solving and abnormal tone  Pt to benefit from continued skilled OT tx while in the hospital to address deficits as defined above and maximize level of functional independence w ADL's and functional mobility  Occupational Performance areas to address include: eating, grooming, bathing/shower, toilet hygiene, dressing and functional mobility  From OT standpoint, recommendation at time of d/c would be inpatient rehab (subacute level) given her rapid decreased in functional status since mechanical fall and pt has one primary caregiver and is currently requiring assist x2  Goals   Patient Goals None stated 2/2 cognition   Plan   Treatment Interventions ADL retraining;Functional transfer training;UE strengthening/ROM; Cognitive reorientation;Patient/family training;Equipment evaluation/education   Goal Expiration Date 09/14/22   OT Treatment Day 0   OT Frequency 2-3x/wk   Recommendation   Recommendation Geriatric Consult   OT Discharge Recommendation Post acute rehabilitation services  (subacute rehab)   Additional Comments  The patient's raw score on the -PAC Daily Activity inpatient short form is 12, standardized score is 30 6, less than 39 4  Patients at this level are likely to benefit from discharge to post-acute rehabilitation services  Please refer to the recommendation of the Occupational Therapist for safe discharge planning     AM-PAC Daily Activity Inpatient   Lower Body Dressing 1   Bathing 2   Toileting 1   Upper Body Dressing 2   Grooming 3   Eating 3   Daily Activity Raw Score 12   Daily Activity Standardized Score (Calc for Raw Score >=11) 30 6   AM-PAC Applied Cognition Inpatient   Following a Speech/Presentation 2   Understanding Ordinary Conversation 2   Taking Medications 1   Remembering Where Things Are Placed or Put Away 1   Remembering List of 4-5 Errands 1   Taking Care of Complicated Tasks 1   Applied Cognition Raw Score 8   Applied Cognition Standardized Score 19 32       Goals: to be met by 9/14/22    Patient will perform functional bed mobility with Moderate Assistance, with HOB flat, no rails  Patient will perform functional transfers with Moderate Assistance in preparation for ADL tasks, with good safety awareness  Patient will perform UB dressing task with Moderate Assistance while seated  Patient will perform toilet transfer with Moderate Assistance to [facility]  Patient will increase LUE strength by 1 MM grade in preparation to increase ability to participate in ADL tasks  Patient will improve activity tolerance by participating in 20 minutes of session at a time in preparation for participation in ADL tasks  Patient will attend to 100% of cognitive task during session   Patient will initiate face washing task with independence when presented with wash cloth, without verbal cues       Martinez Sanchez OTR/L

## 2022-10-04 ENCOUNTER — APPOINTMENT (OUTPATIENT)
Dept: RADIOLOGY | Facility: HOSPITAL | Age: 77
DRG: 640 | End: 2022-10-04
Payer: COMMERCIAL

## 2022-10-04 ENCOUNTER — HOSPITAL ENCOUNTER (INPATIENT)
Facility: HOSPITAL | Age: 77
LOS: 8 days | Discharge: NURSING, REHAB OR LONG-TERM CARE | DRG: 640 | End: 2022-10-12
Attending: EMERGENCY MEDICINE | Admitting: STUDENT IN AN ORGANIZED HEALTH CARE EDUCATION/TRAINING PROGRAM
Payer: COMMERCIAL

## 2022-10-04 ENCOUNTER — APPOINTMENT (EMERGENCY)
Dept: CT IMAGING | Facility: HOSPITAL | Age: 77
DRG: 640 | End: 2022-10-04
Payer: COMMERCIAL

## 2022-10-04 DIAGNOSIS — I10 ESSENTIAL HYPERTENSION: ICD-10-CM

## 2022-10-04 DIAGNOSIS — R62.7 FAILURE TO THRIVE IN ADULT: Primary | ICD-10-CM

## 2022-10-04 DIAGNOSIS — N17.9 AKI (ACUTE KIDNEY INJURY) (HCC): ICD-10-CM

## 2022-10-04 DIAGNOSIS — E87.6 HYPOKALEMIA: ICD-10-CM

## 2022-10-04 DIAGNOSIS — R77.8 ELEVATED TROPONIN: ICD-10-CM

## 2022-10-04 LAB
2HR DELTA HS TROPONIN: 1 NG/L
ALBUMIN SERPL BCP-MCNC: 3.4 G/DL (ref 3.5–5)
ALP SERPL-CCNC: 122 U/L (ref 46–116)
ALT SERPL W P-5'-P-CCNC: 16 U/L (ref 12–78)
ANION GAP SERPL CALCULATED.3IONS-SCNC: 7 MMOL/L (ref 4–13)
AST SERPL W P-5'-P-CCNC: 38 U/L (ref 5–45)
ATRIAL RATE: 267 BPM
BASOPHILS # BLD AUTO: 0.11 THOUSANDS/ÂΜL (ref 0–0.1)
BASOPHILS NFR BLD AUTO: 1 % (ref 0–1)
BILIRUB SERPL-MCNC: 0.83 MG/DL (ref 0.2–1)
BUN SERPL-MCNC: 16 MG/DL (ref 5–25)
CALCIUM ALBUM COR SERPL-MCNC: 9.4 MG/DL (ref 8.3–10.1)
CALCIUM SERPL-MCNC: 8.9 MG/DL (ref 8.3–10.1)
CARDIAC TROPONIN I PNL SERPL HS: 13 NG/L
CARDIAC TROPONIN I PNL SERPL HS: 14 NG/L
CHLORIDE SERPL-SCNC: 102 MMOL/L (ref 96–108)
CO2 SERPL-SCNC: 31 MMOL/L (ref 21–32)
CREAT SERPL-MCNC: 1.45 MG/DL (ref 0.6–1.3)
EOSINOPHIL # BLD AUTO: 0.23 THOUSAND/ÂΜL (ref 0–0.61)
EOSINOPHIL NFR BLD AUTO: 2 % (ref 0–6)
ERYTHROCYTE [DISTWIDTH] IN BLOOD BY AUTOMATED COUNT: 15.4 % (ref 11.6–15.1)
FLUAV RNA RESP QL NAA+PROBE: NEGATIVE
FLUBV RNA RESP QL NAA+PROBE: NEGATIVE
GFR SERPL CREATININE-BSD FRML MDRD: 34 ML/MIN/1.73SQ M
GLUCOSE SERPL-MCNC: 148 MG/DL (ref 65–140)
GLUCOSE SERPL-MCNC: 75 MG/DL (ref 65–140)
HCT VFR BLD AUTO: 41.7 % (ref 34.8–46.1)
HGB BLD-MCNC: 13.5 G/DL (ref 11.5–15.4)
IMM GRANULOCYTES # BLD AUTO: 0.04 THOUSAND/UL (ref 0–0.2)
IMM GRANULOCYTES NFR BLD AUTO: 0 % (ref 0–2)
LYMPHOCYTES # BLD AUTO: 2.98 THOUSANDS/ÂΜL (ref 0.6–4.47)
LYMPHOCYTES NFR BLD AUTO: 29 % (ref 14–44)
MCH RBC QN AUTO: 31.3 PG (ref 26.8–34.3)
MCHC RBC AUTO-ENTMCNC: 32.4 G/DL (ref 31.4–37.4)
MCV RBC AUTO: 97 FL (ref 82–98)
MONOCYTES # BLD AUTO: 1.13 THOUSAND/ÂΜL (ref 0.17–1.22)
MONOCYTES NFR BLD AUTO: 11 % (ref 4–12)
NEUTROPHILS # BLD AUTO: 5.8 THOUSANDS/ÂΜL (ref 1.85–7.62)
NEUTS SEG NFR BLD AUTO: 57 % (ref 43–75)
NRBC BLD AUTO-RTO: 0 /100 WBCS
PHENYTOIN SERPL-MCNC: 28.3 UG/ML (ref 10–20)
PLATELET # BLD AUTO: 178 THOUSANDS/UL (ref 149–390)
PMV BLD AUTO: 10.2 FL (ref 8.9–12.7)
POTASSIUM SERPL-SCNC: 4 MMOL/L (ref 3.5–5.3)
PROT SERPL-MCNC: 7.4 G/DL (ref 6.4–8.4)
QRS AXIS: -20 DEGREES
QRSD INTERVAL: 162 MS
QT INTERVAL: 402 MS
QTC INTERVAL: 463 MS
RBC # BLD AUTO: 4.31 MILLION/UL (ref 3.81–5.12)
RSV RNA RESP QL NAA+PROBE: NEGATIVE
SARS-COV-2 RNA RESP QL NAA+PROBE: NEGATIVE
SODIUM SERPL-SCNC: 140 MMOL/L (ref 135–147)
T WAVE AXIS: 219 DEGREES
T4 FREE SERPL-MCNC: 0.65 NG/DL (ref 0.76–1.46)
TSH SERPL DL<=0.05 MIU/L-ACNC: 0.29 UIU/ML (ref 0.45–4.5)
VENTRICULAR RATE: 80 BPM
WBC # BLD AUTO: 10.29 THOUSAND/UL (ref 4.31–10.16)

## 2022-10-04 PROCEDURE — 84443 ASSAY THYROID STIM HORMONE: CPT

## 2022-10-04 PROCEDURE — 36415 COLL VENOUS BLD VENIPUNCTURE: CPT

## 2022-10-04 PROCEDURE — G1004 CDSM NDSC: HCPCS

## 2022-10-04 PROCEDURE — 0051A: CPT | Performed by: STUDENT IN AN ORGANIZED HEALTH CARE EDUCATION/TRAINING PROGRAM

## 2022-10-04 PROCEDURE — 91305 COVID-19 PFIZER VAC (TRIS SUCROSE, GRAY CAP FORM) 30 MCG/0.3ML SUSP: CPT | Performed by: STUDENT IN AN ORGANIZED HEALTH CARE EDUCATION/TRAINING PROGRAM

## 2022-10-04 PROCEDURE — 82948 REAGENT STRIP/BLOOD GLUCOSE: CPT

## 2022-10-04 PROCEDURE — XW023S6 INTRODUCTION OF COVID-19 VACCINE DOSE 1 INTO MUSCLE, PERCUTANEOUS APPROACH, NEW TECHNOLOGY GROUP 6: ICD-10-PCS | Performed by: INTERNAL MEDICINE

## 2022-10-04 PROCEDURE — 70450 CT HEAD/BRAIN W/O DYE: CPT

## 2022-10-04 PROCEDURE — 99223 1ST HOSP IP/OBS HIGH 75: CPT | Performed by: STUDENT IN AN ORGANIZED HEALTH CARE EDUCATION/TRAINING PROGRAM

## 2022-10-04 PROCEDURE — 80185 ASSAY OF PHENYTOIN TOTAL: CPT

## 2022-10-04 PROCEDURE — 71045 X-RAY EXAM CHEST 1 VIEW: CPT

## 2022-10-04 PROCEDURE — 84484 ASSAY OF TROPONIN QUANT: CPT

## 2022-10-04 PROCEDURE — 99285 EMERGENCY DEPT VISIT HI MDM: CPT

## 2022-10-04 PROCEDURE — 80053 COMPREHEN METABOLIC PANEL: CPT

## 2022-10-04 PROCEDURE — 96360 HYDRATION IV INFUSION INIT: CPT

## 2022-10-04 PROCEDURE — 0241U HB NFCT DS VIR RESP RNA 4 TRGT: CPT

## 2022-10-04 PROCEDURE — 96361 HYDRATE IV INFUSION ADD-ON: CPT

## 2022-10-04 PROCEDURE — 99285 EMERGENCY DEPT VISIT HI MDM: CPT | Performed by: EMERGENCY MEDICINE

## 2022-10-04 PROCEDURE — 85025 COMPLETE CBC W/AUTO DIFF WBC: CPT

## 2022-10-04 PROCEDURE — 93005 ELECTROCARDIOGRAM TRACING: CPT

## 2022-10-04 PROCEDURE — 84439 ASSAY OF FREE THYROXINE: CPT

## 2022-10-04 PROCEDURE — 93010 ELECTROCARDIOGRAM REPORT: CPT | Performed by: INTERNAL MEDICINE

## 2022-10-04 RX ORDER — ALLOPURINOL 100 MG/1
100 TABLET ORAL DAILY
Status: DISCONTINUED | OUTPATIENT
Start: 2022-10-05 | End: 2022-10-12 | Stop reason: HOSPADM

## 2022-10-04 RX ORDER — LORAZEPAM 0.5 MG/1
0.5 TABLET ORAL EVERY 8 HOURS PRN
Status: DISCONTINUED | OUTPATIENT
Start: 2022-10-04 | End: 2022-10-05

## 2022-10-04 RX ORDER — SODIUM CHLORIDE 9 MG/ML
75 INJECTION, SOLUTION INTRAVENOUS CONTINUOUS
Status: DISCONTINUED | OUTPATIENT
Start: 2022-10-04 | End: 2022-10-05

## 2022-10-04 RX ORDER — ACETAMINOPHEN 325 MG/1
650 TABLET ORAL EVERY 6 HOURS PRN
Status: DISCONTINUED | OUTPATIENT
Start: 2022-10-04 | End: 2022-10-12 | Stop reason: HOSPADM

## 2022-10-04 RX ORDER — ENOXAPARIN SODIUM 100 MG/ML
40 INJECTION SUBCUTANEOUS DAILY
Status: DISCONTINUED | OUTPATIENT
Start: 2022-10-04 | End: 2022-10-04

## 2022-10-04 RX ORDER — PHENYTOIN SODIUM 100 MG/1
100 CAPSULE, EXTENDED RELEASE ORAL DAILY
Status: DISCONTINUED | OUTPATIENT
Start: 2022-10-05 | End: 2022-10-12 | Stop reason: HOSPADM

## 2022-10-04 RX ORDER — ONDANSETRON 2 MG/ML
4 INJECTION INTRAMUSCULAR; INTRAVENOUS EVERY 6 HOURS PRN
Status: DISCONTINUED | OUTPATIENT
Start: 2022-10-04 | End: 2022-10-12 | Stop reason: HOSPADM

## 2022-10-04 RX ORDER — ENOXAPARIN SODIUM 100 MG/ML
30 INJECTION SUBCUTANEOUS DAILY
Status: DISCONTINUED | OUTPATIENT
Start: 2022-10-04 | End: 2022-10-07

## 2022-10-04 RX ORDER — ASPIRIN 81 MG/1
81 TABLET ORAL EVERY OTHER DAY
Status: DISCONTINUED | OUTPATIENT
Start: 2022-10-04 | End: 2022-10-12 | Stop reason: HOSPADM

## 2022-10-04 RX ORDER — ATORVASTATIN CALCIUM 10 MG/1
10 TABLET, FILM COATED ORAL
Status: DISCONTINUED | OUTPATIENT
Start: 2022-10-04 | End: 2022-10-12 | Stop reason: HOSPADM

## 2022-10-04 RX ORDER — FAMOTIDINE 20 MG/1
20 TABLET, FILM COATED ORAL
Status: DISCONTINUED | OUTPATIENT
Start: 2022-10-05 | End: 2022-10-12 | Stop reason: HOSPADM

## 2022-10-04 RX ORDER — LEVOTHYROXINE SODIUM 0.05 MG/1
50 TABLET ORAL
Status: DISCONTINUED | OUTPATIENT
Start: 2022-10-05 | End: 2022-10-12 | Stop reason: HOSPADM

## 2022-10-04 RX ADMIN — ASPIRIN 81 MG: 81 TABLET, COATED ORAL at 15:21

## 2022-10-04 RX ADMIN — PREDNISONE 7.5 MG: 5 TABLET ORAL at 15:21

## 2022-10-04 RX ADMIN — ENOXAPARIN SODIUM 30 MG: 30 INJECTION, SOLUTION INTRAVENOUS; SUBCUTANEOUS at 14:59

## 2022-10-04 RX ADMIN — SODIUM CHLORIDE 75 ML/HR: 0.9 INJECTION, SOLUTION INTRAVENOUS at 14:59

## 2022-10-04 RX ADMIN — BNT162B2 0.3 ML: 0.23 INJECTION, SUSPENSION INTRAMUSCULAR at 18:36

## 2022-10-04 RX ADMIN — LAMOTRIGINE 250 MG: 100 TABLET ORAL at 18:34

## 2022-10-04 RX ADMIN — SODIUM CHLORIDE 1000 ML: 0.9 INJECTION, SOLUTION INTRAVENOUS at 11:29

## 2022-10-04 RX ADMIN — ATORVASTATIN CALCIUM 10 MG: 10 TABLET, FILM COATED ORAL at 18:34

## 2022-10-04 NOTE — ED NOTES
Xray at [address] [place] [address], [address] [place] [address]  10/04/22 7766

## 2022-10-04 NOTE — ED ATTENDING ATTESTATION
10/4/2022  IAdriana MD, saw and evaluated the patient  I have discussed the patient with the resident/non-physician practitioner and agree with the resident's/non-physician practitioner's findings, Plan of Care, and MDM as documented in the resident's/non-physician practitioner's note, except where noted  All available labs and Radiology studies were reviewed  I was present for key portions of any procedure(s) performed by the resident/non-physician practitioner and I was immediately available to provide assistance  At this point I agree with the current assessment done in the Emergency Department  I have conducted an independent evaluation of this patient a history and physical is as follows:  Patient with hx of Stroke, Right hemiparesis, dysarthria, neruosarcoidosis, AFib, accompanied by son and caregiver from home, not eating/drinking, fatigue, vomiting, not taking meds, no fever, cough  On exam, patient is conscious, alert, aphasia, right hemiparesis at baseline, no respiratory distress, pulses are equal, irregular, abdomen soft nontender, no peripheral edema, no calf tenderness swelling  Impression:  Failure to thrive, rule out electrolyte imbalance, dehydration, due to patient's age and risk factors, will check cardiac workup to labs, EKG  ED Course     ER workup results reviewed, including labs, EKG, CT head, mild David I noted, IV fluids were given  Son unable to take care of patient at home  Case management was contacted  Will admit to medical service for further evaluation management      Critical Care Time  Procedures

## 2022-10-04 NOTE — ACP (ADVANCE CARE PLANNING)
Serious Illness Conversation    1  What is your understanding now of where you are with your illness? Prognostic Understanding: son has appropriate understanding of prognosis     2  How much information about what is likely to be ahead with your illness would you like to have? Information: patient son wants to be informed       4  If your health situation worsens, what are your most important goals? Goals: be at home, be physically comfortable     5  What are the biggest fears and worries about the future and your health? Fears/Worries: being a physical burden     6  What abilities are so critical to your life that you cannot imagine living without them? Unacceptable Function: being chronically confused or not being myself, being in chronic severe pain, being in pain or very uncomfortable     7  What gives you strength as you think about the future with your illness? 8  If you become sicker, how much are you willing to go through for the possibility of gaining more time? Be in the hospital: No Have a feeding tube: No   Be in the ICU: No Live in a nursing home: No   Be on a ventilator: No Be uncomfortable: No   Be on dialysis: No Undergo aggressive test and/or procedures: No   9  How much does your proxy and family know about your priorities and wishes? Discussion Discussion: extensive discussion with family about goals and wishes      At this time, family is not yet ready for hospice, requesting that patient tries rehab, and then evaluated if she is able to go back home  Discussed code status as well, discussed at length regarding CPR, intubation as needed as she will likely be a poor candidate given her age and comorbidities  Son still request that patient is a full code at this time but is aware that if she were decline will consider again in the future

## 2022-10-04 NOTE — ED NOTES
Spoke to the lab about pts troponin lab that was sent around 1050   Lab said it should be resulted in the next 10-15 minutes      Aurea Santo RN  10/04/22 8851

## 2022-10-04 NOTE — ASSESSMENT & PLAN NOTE
Wt Readings from Last 3 Encounters:   08/31/22 64 8 kg (142 lb 13 7 oz)   08/30/22 69 8 kg (153 lb 14 1 oz)   08/05/22 76 kg (167 lb 8 8 oz)     Patient appears to be hypovolemic, will give gentle IV fluids  Hold diuretics

## 2022-10-04 NOTE — ASSESSMENT & PLAN NOTE
38-year-old female with past medical history of neurosarcoidosis, seizures, hypertension and AFib presents with failure to thrive  CT head negative for acute processes  Some reporting that over the past several months, patient has had worsening appetite, dysphagia and appears to be confused at times  Patient requesting placement for short-term rehab  Discussed goals of care, family not yet ready for hospice, continues to be full code  Case management following, patient will need to be option, not yet vaccinated family agreeing for COVID vaccine  PT, OT and speech therapy  Will give gentle IV fluids

## 2022-10-04 NOTE — CASE MANAGEMENT
Case Management ED Assessment    Patient name Jennifer El  Location ED 28/ED 28 MRN [id number]  : 1945 Date 10/4/2022        OBJECTIVE:  Predictive Model Details         61% Factor Value    Risk of Hospital Admission or ED Visit Model Number of ED Visits 4     Is in Relationship No     Has Atrial Fibrillation Yes     Has CVD Yes     Has CHF Yes     Has PCP Yes            Chief Complaint: Failure to thrive in adult   Patient Class: Inpatient  Preferred Pharmacy:   [id number], [place] [address],, [address],  [address]   Phone: [phone] Fax: [phone]    Primary Care Provider: Juan Jose Farnsworth MD    Primary Insurance: [facility]  Secondary Insurance:     ED ASSESSMENT:  Padmaja Dozier Proxies    There are no active Health Care Proxies on file  Readmission Root Cause  30 Day Readmission: No    Patient Information  Admitted from[de-identified] Home  Mental Status: Confused  During Assessment patient was accompanied by:  Son  Assessment information provided by[de-identified] Son  Primary Caregiver: Family  Caregiver's Name[de-identified] Son Ryan Marie Relationship to Patient[de-identified] Family Member  Support Systems: Son, Home care staff, Family members  [place] of Residence: [address] do you live in?: [address]  Type of Current Residence: 2 story home  Upon entering residence, is there a bedroom on the main floor (no further steps)?: Yes (Pt stays on a couch on 1st level)  Upon entering residence, is there a bathroom on the main floor (no further steps)?: Yes (Pt uses commode)  Homeless/housing insecurity resource given?: N/A  Living Arrangements: Lives w/ Son    Patient Information Continued  Income Source: Pension/shelter  Does patient have prescription coverage?: Yes  Within the past 12 months, you worried that your food would run out before you got the money to buy more : Never true  Within the past 12 months, the food you bought just didn't last and you didn't have money to get more : Never true  Food insecurity resource given?: N/A  Does patient receive dialysis treatments?: No  Does patient have a history of substance abuse?: No  Does patient have a history of Mental Health Diagnosis?: No

## 2022-10-04 NOTE — H&P
[facility] 1945, 76 y o  female MRN: [id number]  Unit/Bed#: ED 28 Encounter: [id number]  Primary Care Provider: Samuel Hernandez MD   Date and time admitted to hospital: 10/4/2022  9:48 AM    * Failure to thrive in adult  Assessment & Plan  61-year-old female with past medical history of neurosarcoidosis, seizures, hypertension and AFib presents with failure to thrive  CT head negative for acute processes  Some reporting that over the past several months, patient has had worsening appetite, dysphagia and appears to be confused at times  Patient requesting placement for short-term rehab  Discussed goals of care, family not yet ready for hospice, continues to be full code  Case management following, patient will need to be option, not yet vaccinated family agreeing for COVID vaccine  PT, OT and speech therapy  Will give gentle IV fluids    Neurosarcoidosis  Assessment & Plan  Continue home prednisone dose    Chronic systolic heart failure (HCC)  Assessment & Plan  Wt Readings from Last 3 Encounters:   08/31/22 64 8 kg (142 lb 13 7 oz)   08/30/22 69 8 kg (153 lb 14 1 oz)   08/05/22 76 kg (167 lb 8 8 oz)     Patient appears to be hypovolemic, will give gentle IV fluids  Hold diuretics        Atrial fibrillation (HCC)  Assessment & Plan  EKG showing Afib  Not on anticoagulation  Rate control not on AV shelly blockers    Essential hypertension  Assessment & Plan  Hold home BP meds, blood pressure currently in range      Seizures (HCC)  Assessment & Plan  No recent seizures  Continue lamictal and phenytoin      VTE Prophylaxis: Enoxaparin (Lovenox)  / sequential compression device   Code Status: FC  POLST: There is no POLST form on file for this patient (pre-hospital)  Discussion with family: son at bedside    Anticipated Length of Stay:  Patient will be admitted on an Inpatient basis with an anticipated length of stay of 2 midnights     Justification for Hospital Stay: Case mgmt following for placement    Total Time for Visit, including Counseling / Coordination of Care: 45 minutes  Greater than 50% of this total time spent on direct patient counseling and coordination of care  Chief Complaint:   Failure to Thrive    History of Present Illness:    Hamzah Vasques is a 76 y o  female who presents with failure to thrive  Past medical history of neurosarcoidosis, seizures, AFib and hypertension  Son brought patient in via EMS, stating that he is no longer able to care for her  He states that over the last several months, he has noticed a decline in her mental status, appetite and activity  He states that she has been difficult to take her medications, has become mainly bed-bound no longer ambulating  Current studies in the ED has been negative, CT head without acute processes  UA has been ordered, x-ray without infection  Labs have been fairly unremarkable  Patient did have slightly worsening in her renal functions  Discussed with son at bedside, requesting that patient try for rehab to see if she can get stronger back to her previous baseline  Case management following at this time  Discussed code status, request that patient is a full code at this time  Review of Systems:    Review of Systems   Unable to perform ROS: Mental status change        Past Medical and Surgical History:     Past Medical History:   Diagnosis Date   • Disease of thyroid gland    • Hypertension    • Seizure ([facility] [address] )    • Stroke [facility])        History reviewed  No pertinent surgical history  Meds/Allergies:    Prior to Admission medications    Medication Sig Start Date End Date Taking?  Authorizing Provider   allopurinol (ZYLOPRIM) 100 mg tablet Take 100 mg by mouth daily   Yes Historical Provider, MD   amLODIPine (NORVASC) 5 mg tablet Take 5 mg by mouth daily   Yes Historical Provider, MD   aspirin (ECOTRIN LOW STRENGTH) 81 mg EC tablet Take 81 mg by mouth every other day   Yes Historical Provider, MD atorvastatin (LIPITOR) 10 mg tablet Take 10 mg by mouth daily   Yes Historical Provider, MD   b complex vitamins capsule Take 1 capsule by mouth daily   Yes Historical Provider, MD   cholecalciferol (VITAMIN D3) 1,000 units tablet Take 1,000 Units by mouth daily   Yes Historical Provider, MD   famotidine (PEPCID) 20 mg tablet Take 20 mg by mouth 2 (two) times a day   Yes Historical Provider, MD   furosemide (LASIX) 40 mg tablet Take 40 mg by mouth daily   Yes Historical Provider, MD   lamoTRIgine (LaMICtal) 200 MG tablet Take 250 mg by mouth 2 (two) times a day   Yes Historical Provider, MD   levothyroxine 50 mcg tablet Take 50 mcg by mouth daily   Yes Historical Provider, MD   LORazepam (ATIVAN) 1 mg tablet Take 0 5 mg by mouth every 8 (eight) hours as needed for anxiety   Yes Historical Provider, MD   losartan (COZAAR) 100 MG tablet Take 100 mg by mouth daily   Yes Historical Provider, MD   nystatin (MYCOSTATIN) cream Apply topically as needed   Yes Historical Provider, MD   phenytoin (DILANTIN) 100 mg ER capsule Take 100 mg by mouth daily Monday, Wednesday & Friday 100mg  Tuesday, Thursday, Saturday and Sunday- 200mg   Yes Historical Provider, MD   potassium chloride (MICRO-K) 10 MEQ CR capsule Take 10 mEq by mouth 3 (three) times a day   Yes Historical Provider, MD   predniSONE 1 mg tablet Take 7 5 mg by mouth daily   Yes Historical Provider, MD     I have reviewed home medications with patient personally  Allergies: Allergies   Allergen Reactions   • Iodine - Food Allergy        Social History:     Marital Status:     Occupation: none  Patient Pre-hospital Living Situation: home with son  Patient Pre-hospital Level of Mobility: bedbound  Patient Pre-hospital Diet Restrictions: dysphagia  Substance Use History:   Social History     Substance and Sexual Activity   Alcohol Use Never     Social History     Tobacco Use   Smoking Status Never Smoker   Smokeless Tobacco Never Used     Social History Substance and Sexual Activity   Drug Use Never       Family History:    History reviewed  No pertinent family history  Physical Exam:     Vitals:   Blood Pressure: 138/77 (10/04/22 1300)  Pulse: 80 (10/04/22 1300)  Temperature: 99 6 °F (37 6 °C) (10/04/22 1227)  Temp Source: Rectal (10/04/22 1227)  Respirations: 18 (10/04/22 1300)  SpO2: 100 % (10/04/22 1300)    Physical Exam  Vitals and nursing note reviewed  Constitutional:       General: She is not in acute distress  Appearance: She is ill-appearing  HENT:      Head: Normocephalic and atraumatic  Eyes:      General: No scleral icterus  Conjunctiva/sclera: Conjunctivae normal    Cardiovascular:      Rate and Rhythm: Normal rate  Rhythm irregular  Pulmonary:      Breath sounds: Normal breath sounds  No wheezing or rhonchi  Abdominal:      General: Bowel sounds are normal  There is no distension  Palpations: Abdomen is soft  Musculoskeletal:         General: No swelling  Right lower leg: No edema  Left lower leg: No edema  Skin:     General: Skin is warm and dry  Neurological:      Mental Status: She is disoriented  Motor: Weakness present  Additional Data:     Lab Results: I have personally reviewed pertinent reports  Results from last 7 days   Lab Units 10/04/22  1047   WBC Thousand/uL 10 29*   HEMOGLOBIN g/dL 13 5   HEMATOCRIT % 41 7   PLATELETS Thousands/uL 178   NEUTROS PCT % 57   LYMPHS PCT % 29   MONOS PCT % 11   EOS PCT % 2     Results from last 7 days   Lab Units 10/04/22  1047   SODIUM mmol/L 140   POTASSIUM mmol/L 4 0   CHLORIDE mmol/L 102   CO2 mmol/L 31   BUN mg/dL 16   CREATININE mg/dL 1 45*   ANION GAP mmol/L 7   CALCIUM mg/dL 8 9   ALBUMIN g/dL 3 4*   TOTAL BILIRUBIN mg/dL 0 83   ALK PHOS U/L 122*   ALT U/L 16   AST U/L 38   GLUCOSE RANDOM mg/dL 75                       Imaging: I have personally reviewed pertinent reports        CT head without contrast   Final Result by Yolande Stoner Jewels Ley MD (10/04 8993)      No evidence of acute intracranial process or significant interval change  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

## 2022-10-04 NOTE — CASE MANAGEMENT
Case Management Discharge Planning Note    Patient name Kristen Ferreira  Location ED 28/ED 28 MRN [id number]  : 1945 Date 10/4/2022       Current Admission Date: 10/4/2022  Current Admission Diagnosis:Failure to thrive in adult   Patient Active Problem List    Diagnosis Date Noted   • Failure to thrive in adult 10/04/2022   • Seizures ([facility] [address] ) 2021   • Stroke ([facility] [address] ) 2021   • Essential hypertension 2021   • Hypothyroidism 2021   • Hyperlipidemia 2021   • Atrial fibrillation ([facility] [address] ) 2021   • Chronic systolic heart failure ([facility] [address] ) 2021   • Neurosarcoidosis 2021      LOS (days): 0  Geometric Mean LOS (GMLOS) (days):   Days to GMLOS:     OBJECTIVE:            Current admission status: Inpatient   Preferred Pharmacy:   [id number], [facility] [address] [place] [address]  Phone: [phone] Fax: [phone]    Primary Care Provider: Angel Shin MD    Primary Insurance: [facility]  Secondary Insurance:     DISCHARGE DETAILS:    Additional Comments: CM received message from physician informing that pt's son has concern about caring for pt at home alone and is interested in STR placement  CM attempted to meet with pt's son at bedside however he was not there, CM called pt's son and left a VM requesting a return call  Barriers to placement could include possible need for long term care and pt being unvaccinated for Covid   department to follow

## 2022-10-04 NOTE — ED PROVIDER NOTES
History  Chief Complaint   Patient presents with   • Failure To Thrive     Pt arrives via ems from home per ems caregiver c/o pt having decreased appetite and not drinking as much  Caregiver with pt for about 2 weeks and said since she has been with her she is getting harder to understand  Pt as hx of stroke with right sided deficits      HPI  22-year-old female with past medical history significant for neurosarcoidosis, stroke in 2013 with residual right-sided weakness, seizure on Dilantin Lamictal, hypertension, hyperlipidemia, hypothyroidism, paroxysmal atrial fibrillation on ASA presents to the ED via EMS from home for decreased p o  intake and fatigue over the past week  Son and caretaker in room helped provide the history  He states that patient has been not eating and drinking, has been refusing to take her medicines  They also endorse vomiting  Denies fever, cough, difficulty breathing, diarrhea  Prior to Admission Medications   Prescriptions Last Dose Informant Patient Reported? Taking?    LORazepam (ATIVAN) 1 mg tablet 10/4/2022 at Unknown time  Yes Yes   Sig: Take 0 5 mg by mouth every 8 (eight) hours as needed for anxiety   allopurinol (ZYLOPRIM) 100 mg tablet 10/3/2022 at Unknown time  Yes Yes   Sig: Take 100 mg by mouth daily   amLODIPine (NORVASC) 5 mg tablet 10/3/2022 at Unknown time  Yes Yes   Sig: Take 5 mg by mouth daily   aspirin (ECOTRIN LOW STRENGTH) 81 mg EC tablet Past Week at Unknown time  Yes Yes   Sig: Take 81 mg by mouth every other day   atorvastatin (LIPITOR) 10 mg tablet 10/3/2022 at Unknown time  Yes Yes   Sig: Take 10 mg by mouth daily   b complex vitamins capsule 10/3/2022 at Unknown time  Yes Yes   Sig: Take 1 capsule by mouth daily   cholecalciferol (VITAMIN D3) 1,000 units tablet 10/3/2022 at Unknown time  Yes Yes   Sig: Take 1,000 Units by mouth daily   famotidine (PEPCID) 20 mg tablet 10/4/2022 at Unknown time  Yes Yes   Sig: Take 20 mg by mouth 2 (two) times a day furosemide (LASIX) 40 mg tablet 10/3/2022 at Unknown time  Yes Yes   Sig: Take 40 mg by mouth daily   lamoTRIgine (LaMICtal) 200 MG tablet 10/3/2022 at Unknown time  Yes Yes   Sig: Take 250 mg by mouth 2 (two) times a day   levothyroxine 50 mcg tablet 10/4/2022 at Unknown time  Yes Yes   Sig: Take 50 mcg by mouth daily   losartan (COZAAR) 100 MG tablet 10/3/2022 at Unknown time  Yes Yes   Sig: Take 100 mg by mouth daily   nystatin (MYCOSTATIN) cream 10/3/2022 at Unknown time  Yes Yes   Sig: Apply topically as needed   phenytoin (DILANTIN) 100 mg ER capsule 10/3/2022 at Unknown time  Yes Yes   Sig: Take 100 mg by mouth daily Monday, Wednesday & Friday 100mg  Tuesday, Thursday, Saturday and Sunday- 200mg   potassium chloride (MICRO-K) 10 MEQ CR capsule 10/3/2022 at Unknown time  Yes Yes   Sig: Take 10 mEq by mouth 3 (three) times a day   predniSONE 1 mg tablet 10/3/2022 at Unknown time  Yes Yes   Sig: Take 7 5 mg by mouth daily      Facility-Administered Medications: None       Past Medical History:   Diagnosis Date   • Disease of thyroid gland    • Hypertension    • Seizure ([facility] [address] )    • Stroke [facility])        History reviewed  No pertinent surgical history  History reviewed  No pertinent family history  I have reviewed and agree with the history as documented  E-Cigarette/Vaping   • E-Cigarette Use Never User      E-Cigarette/Vaping Substances   • Nicotine No    • THC No    • CBD No    • Flavoring No    • Other No    • Unknown No      Social History     Tobacco Use   • Smoking status: Never Smoker   • Smokeless tobacco: Never Used   Vaping Use   • Vaping Use: Never used   Substance Use Topics   • Alcohol use: Never   • Drug use: Never        Review of Systems   Unable to perform ROS: Dementia   Constitutional: Positive for fatigue  Negative for fever  Respiratory: Negative for cough and shortness of breath  Cardiovascular:        H/o paroxysmal afib    Gastrointestinal: Positive for vomiting     Neurological: H/o stroke with residual right sided weakness        Physical Exam  ED Triage Vitals   Temperature Pulse Respirations Blood Pressure SpO2   10/04/22 1201 10/04/22 0955 10/04/22 0955 10/04/22 0955 10/04/22 0955   97 7 °F (36 5 °C) 92 17 132/79 100 %      Temp Source Heart Rate Source Patient Position - Orthostatic VS BP Location FiO2 (%)   10/04/22 1201 -- 10/04/22 0955 10/04/22 0955 --   Rectal  Lying Right arm       Pain Score       10/04/22 1351       No Pain             Orthostatic Vital Signs  Vitals:    10/04/22 0955 10/04/22 1300   BP: 132/79 138/77   Pulse: 92 80   Patient Position - Orthostatic VS: Lying Lying       Physical Exam  Vitals and nursing note reviewed  Constitutional:       General: She is not in acute distress  Appearance: She is not toxic-appearing or diaphoretic  Comments: Appears chronically ill and deconditioned    HENT:      Head: Normocephalic and atraumatic  Mouth/Throat:      Mouth: Mucous membranes are dry  Eyes:      Conjunctiva/sclera: Conjunctivae normal       Pupils: Pupils are equal, round, and reactive to light  Cardiovascular:      Rate and Rhythm: Normal rate  Rhythm irregular  Pulses: Normal pulses  Heart sounds: Normal heart sounds  No murmur heard  Pulmonary:      Effort: Pulmonary effort is normal  No respiratory distress  Breath sounds: Normal breath sounds  Abdominal:      Palpations: Abdomen is soft  Tenderness: There is no abdominal tenderness  Skin:     General: Skin is warm and dry  Capillary Refill: Capillary refill takes more than 3 seconds  Neurological:      Mental Status: She is alert  Comments: Unable to follow commands  Unable to tell me her name and age  Psychiatric:         Speech: Speech is delayed  Behavior: Behavior is slowed           ED Medications  Medications   COVID-19 Pfizer vac (bautista sucrose, gray cap form) 0 3 mL (has no administration in time range)   sodium chloride 0 9 % infusion (75 mL/hr Intravenous New Bag 10/4/22 2259)   acetaminophen (TYLENOL) tablet 650 mg (has no administration in time range)   ondansetron (ZOFRAN) injection 4 mg (has no administration in time range)   enoxaparin (LOVENOX) subcutaneous injection 30 mg (30 mg Subcutaneous Given 10/4/22 1459)   allopurinol (ZYLOPRIM) tablet 100 mg (has no administration in time range)   aspirin (ECOTRIN LOW STRENGTH) EC tablet 81 mg (has no administration in time range)   atorvastatin (LIPITOR) tablet 10 mg (has no administration in time range)   famotidine (PEPCID) tablet 20 mg (has no administration in time range)   lamoTRIgine (LaMICtal) tablet 250 mg (has no administration in time range)   levothyroxine tablet 50 mcg (has no administration in time range)   LORazepam (ATIVAN) tablet 0 5 mg (has no administration in time range)   phenytoin (DILANTIN) ER capsule 100 mg (has no administration in time range)   predniSONE tablet 7 5 mg (has no administration in time range)   sodium chloride 0 9 % bolus 1,000 mL (1,000 mL Intravenous New Bag 10/4/22 1129)       Diagnostic Studies  Results Reviewed     Procedure Component Value Units Date/Time    Urinalysis with microscopic [[id number]]     Lab Status: No result Specimen: Urine     HS Troponin I 2hr [[id number]]  (Normal) Collected: 10/04/22 1325    Lab Status: Final result Specimen: Blood from Arm, Left Updated: 10/04/22 1400     hs TnI 2hr 14 ng/L      Delta 2hr hsTnI 1 ng/L     HS Troponin 0hr (reflex protocol) [[id number]]  (Normal) Collected: 10/04/22 1047    Lab Status: Final result Specimen: Blood from Arm, Left Updated: 10/04/22 1304     hs TnI 0hr 13 ng/L     COVID/FLU/RSV [[id number]]  (Normal) Collected: 10/04/22 1102    Lab Status: Final result Specimen: Nares from Nasopharyngeal Swab Updated: 10/04/22 1144     SARS-CoV-2 Negative     INFLUENZA A PCR Negative     INFLUENZA B PCR Negative     RSV PCR Negative    Narrative:      FOR PEDIATRIC PATIENTS - copy/paste COVID Guidelines URL to browser: [email or web address]    SARS-CoV-2 assay is a Nucleic Acid Amplification assay intended for the  qualitative detection of nucleic acid from SARS-CoV-2 in nasopharyngeal  swabs  Results are for the presumptive identification of SARS-CoV-2 RNA  Positive results are indicative of infection with SARS-CoV-2, the virus  causing COVID-19, but do not rule out bacterial infection or co-infection  with other viruses  Laboratories within the [place] and its  territories are required to report all positive results to the appropriate  public health authorities  Negative results do not preclude SARS-CoV-2  infection and should not be used as the sole basis for treatment or other  patient management decisions  Negative results must be combined with  clinical observations, patient history, and epidemiological information  This test has not been FDA cleared or approved  This test has been authorized by FDA under an Emergency Use Authorization  (EUA)  This test is only authorized for the duration of time the  declaration that circumstances exist justifying the authorization of the  emergency use of an in vitro diagnostic tests for detection of SARS-CoV-2  virus and/or diagnosis of COVID-19 infection under section 564(b)(1) of  the Act, 21 U  S C  167FJI-5(L)(4), unless the authorization is terminated  or revoked sooner  The test has been validated but independent review by FDA  and CLIA is pending  Test performed using [employer] Gene[employer]pert: This RT-PCR assay targets N2,  a region unique to SARS-CoV-2  A conserved region in the E-gene was chosen  for pan-Sarbecovirus detection which includes SARS-CoV-2  According to CMS-2020-01-R, this platform meets the definition of high-throughput technology      Phenytoin level, total [[id number]]  (Abnormal) Collected: 10/04/22 1047    Lab Status: Final result Specimen: Blood from Arm, Left Updated: 10/04/22 1118     Phenytoin Lvl 28 3 ug/mL     TSH, 3rd generation with Free T4 reflex [[id number]]  (Abnormal) Collected: 10/04/22 1047    Lab Status: Final result Specimen: Blood from Arm, Left Updated: 10/04/22 1118     TSH 3RD GENERATON 0 287 uIU/mL     Narrative:      Patients undergoing fluorescein dye angiography may retain small amounts of fluorescein in the body for 48-72 hours post procedure  Samples containing fluorescein can produce falsely depressed TSH values  If the patient had this procedure,a specimen should be resubmitted post fluorescein clearance  T4, free [[id number]] Collected: 10/04/22 1047    Lab Status:  In process Specimen: Blood from Arm, Left Updated: 10/04/22 1118    Comprehensive metabolic panel [[id number]]  (Abnormal) Collected: 10/04/22 1047    Lab Status: Final result Specimen: Blood from Arm, Left Updated: 10/04/22 1113     Sodium 140 mmol/L      Potassium 4 0 mmol/L      Chloride 102 mmol/L      CO2 31 mmol/L      ANION GAP 7 mmol/L      BUN 16 mg/dL      Creatinine 1 45 mg/dL      Glucose 75 mg/dL      Calcium 8 9 mg/dL      Corrected Calcium 9 4 mg/dL      AST 38 U/L      ALT 16 U/L      Alkaline Phosphatase 122 U/L      Total Protein 7 4 g/dL      Albumin 3 4 g/dL      Total Bilirubin 0 83 mg/dL      eGFR 34 ml/min/1 73sq m     Narrative:      [facility] guidelines for Chronic Kidney Disease (CKD):   •  Stage 1 with normal or high GFR (GFR > 90 mL/min/1 73 square meters)  •  Stage 2 Mild CKD (GFR = 60-89 mL/min/1 73 square meters)  •  Stage 3A Moderate CKD (GFR = 45-59 mL/min/1 73 square meters)  •  Stage 3B Moderate CKD (GFR = 30-44 mL/min/1 73 square meters)  •  Stage 4 Severe CKD (GFR = 15-29 mL/min/1 73 square meters)  •  Stage 5 End Stage CKD (GFR <15 mL/min/1 73 square meters)  Note: GFR calculation is accurate only with a steady state creatinine    CBC and differential [[id number]]  (Abnormal) Collected: 10/04/22 1047    Lab Status: Final result Specimen: Blood from Arm, Left Updated: 10/04/22 1054     WBC 10 29 Thousand/uL      RBC 4 31 Million/uL      Hemoglobin 13 5 g/dL      Hematocrit 41 7 %      MCV 97 fL      MCH 31 3 pg      MCHC 32 4 g/dL      RDW 15 4 %      MPV 10 2 fL      Platelets 561 Thousands/uL      nRBC 0 /100 WBCs      Neutrophils Relative 57 %      Immat GRANS % 0 %      Lymphocytes Relative 29 %      Monocytes Relative 11 %      Eosinophils Relative 2 %      Basophils Relative 1 %      Neutrophils Absolute 5 80 Thousands/µL      Immature Grans Absolute 0 04 Thousand/uL      Lymphocytes Absolute 2 98 Thousands/µL      Monocytes Absolute 1 13 Thousand/µL      Eosinophils Absolute 0 23 Thousand/µL      Basophils Absolute 0 11 Thousands/µL     POCT urinalysis dipstick [[id number]]     Lab Status: No result Specimen: Urine                  CT head without contrast   Final Result by Omar Mahoney MD (10/04 1077)      No evidence of acute intracranial process or significant interval change  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  Workstation performed: [id number]         XR chest 1 view portable   Final Result by Red Cosme MD (10/04 1101)      Lungs clear  Trace right effusion, similar to the abdomen CT from August 2022  Workstation performed: [id number]               Procedures  ECG 12 Lead Documentation Only    Date/Time: 10/4/2022 9:57 AM  Performed by: Zahraa Rebolledo MD  Authorized by:  Zahraa Rebolledo MD     Indications / Diagnosis:  Ams  ECG reviewed by me, the ED Provider: yes    Patient location:  ED  Previous ECG:     Previous ECG:  Compared to current    Comparison ECG info:  8/31/2022  Rate:     ECG rate:  80    ECG rate assessment: normal    Rhythm:     Rhythm: atrial fibrillation    Conduction:     Conduction: abnormal      Abnormal conduction: complete LBBB    T waves:     T waves: inverted      Inverted:  II, III, aVF, aVL, I, V3, V4, V5 and V6          ED Course  ED Course as of 10/04/22 1503   Tue Oct 04, 2022   9037 Blood Pressure: 132/79   0956 Pulse: 92   0956 Respirations: 17   0956 SpO2: 100 %   1115 Hemoglobin: 13 5  wnl   1115 WBC(!): 10 29  Stable, was 10 47 one month ago   1115 Platelet Count: 616  wnl   1116 Creatinine(!): 1 45  NADIYA  Was 1 24 one month ago   1116 CXR:    Lungs clear  Trace right effusion, similar to the abdomen CT from August 2022   1153 PHENYTOIN LEVEL (DILANTIN)(!): 28 3   1153 TSH 3RD GENERATON(!): 0 287   1153 SARS-COV-2: Negative   1153 INFLU A PCR: Negative   1153 INFLU B PCR: Negative   1153 RSV PCR: Negative   1204 Temperature: 97 7 °F (36 5 °C)   1204 Temp Source: Rectal   1204 U/A delayed due to contaminated sample, patient used bedpan and also had a bowel movement  [address]  out to ED care management  1250 CT head: No evidence of acute intracranial process or significant interval change  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   1318 hs TnI 0hr: 13   1328 Reached out to [facility]    1347 Admitted to [facility] for failure to thrive and NADIYA  MDM  75-year-old female with past medical history significant for neurosarcoidosis, stroke in 2013 with residual right-sided weakness, seizure on Dilantin and Lamictal, paroxysmal atrial fibrillation on aspirin, hypertension, hyperlipidemia, hypothyroidism presents to the ED via EMS from home for evaluation of decreased p o  intake, vomiting, fatigue x1 week  Patient is accompanied by her son and caretaker who helped provide the history  Afebrile  Vitals stable  No acute distress but does appear chronically ill and deconditioned  Exam significant for dry mucous membranes, delayed capillary refill, irregularly irregular heart rhythm  Patient unable to follow any commands, speech is delayed, behavior is slowed    Will evaluate with CBC, CMP, troponin, TSH, urinalysis, COVID/flu/RSV, phenytoin level, EKG, chest x-ray, CT head without contrast   1 L of normal saline IV fluids ordered  Will reassess periodically  CBC within normal limits  CMP significant for NADIYA with creatinine of 1 45, was 1 24 one month ago  Phenytoin level slightly elevated at 28 3, was 19 5 one year ago  Initial troponin was 13, delta troponin was 1  COVID/flu/RSV negative  Urinalysis was delayed due to contaminated sample, no urine sample was obtained at time of admission  Chest x-ray showed trace right effusion, similar to abdomen CT from August 2022  CT head without contrast showed no evidence of acute intracranial process or significant interval change, old left frontal infarct noted  Patient's son expressed concerns about caring for patient at home alone  Although home care was recently started 2 weeks ago he feels as though she requires a higher level of care  Patient does not feel comfortable taking the patient home if she were to be discharged  Discussed with him about potential barriers to placement could include being unvaccinated for COVID  Reached out to ED case management [facility], she will come see the patient and her son  PT/OT evaluation ordered  Admitted to [facility] for failure to thrive, NADIYA, possible SNF placement       Disposition  Final diagnoses:   Failure to thrive in adult   NADIYA (acute kidney injury) ([facility] )   Elevated troponin     Time reflects when diagnosis was documented in both MDM as applicable and the Disposition within this note     Time User Action Codes Description Comment    10/4/2022  1:45 PM Tano Michael T Add [R62 7] Failure to thrive in adult     10/4/2022  1:45 PM Kimberly Barr T Add [N17 9] NADIYA (acute kidney injury) ([facility] )     10/4/2022  1:45 PM Tano Michael T Add [R77 8] Elevated troponin       ED Disposition     ED Disposition   Admit    Condition   Stable    Date/Time   Tue Oct 4, 2022  1:44 PM    Comment   Case was discussed with Dr Danni Zavala and the patient's admission status was agreed to be Admission Status: inpatient status to the service of Dr Grace Bray  Follow-up Information    None         Patient's Medications   Discharge Prescriptions    No medications on file     No discharge procedures on file  PDMP Review     None           ED Provider  Attending physically available and evaluated Becca Mooney I managed the patient along with the ED Attending      Electronically Signed by         Pete العلي MD  10/04/22 7477

## 2022-10-04 NOTE — ED NOTES
Patient transported to [address] [place] [address],[address], [facility]  10/04/22 6488

## 2022-10-04 NOTE — ED NOTES
RN unable to obtain IV access   Filiberto PRADO attempting to get IV access with ultrasound at this time     Noe Valle RN  10/04/22 1110

## 2022-10-04 NOTE — CASE MANAGEMENT
Case Management ED Discharge Planning Note    Patient name Jennifer El  Location ED 28/ED 28 MRN [id number]  : 1945 Date 10/4/2022        OBJECTIVE:  Predictive Model Details         61% Factor Value    Risk of Hospital Admission or ED Visit Model Number of ED Visits 4     Is in Relationship No     Has Atrial Fibrillation Yes     Has CVD Yes     Has CHF Yes     Has PCP Yes            Chief Complaint: Failure to thrive in adult   Patient Class: Inpatient  Preferred Pharmacy:   [id number], [place] [address],, [address],  [address] [address]  Phone: [phone] Fax: [phone]    Primary Care Provider: Juan Jose Farnsworth MD    Primary Insurance: [facility]  Secondary Insurance:     ED Discharge Details:    Discharge planning discussed with[de-identified] Pt's son  Freedom of Choice: Yes  Comments - Freedom of Choice: Pt's son interested in [address] placement for pt at this time, he reports he would like pt to return home at time of discharge from a facility if possible but believes she needs to be stronger in order to manage her needs    CM contacted family/caregiver?: Yes (Spoke with son via phone)  Were Treatment Team discharge recommendations reviewed with patient/caregiver?: Yes  Did patient/caregiver verbalize understanding of patient care needs?: Yes  Were patient/caregiver advised of the risks associated with not following Treatment Team discharge recommendations?: Yes    Contacts  Patient Contacts: Adeel Bridges  Relationship to Patient[de-identified] Family  Contact Method: Phone  Phone Number: [phone]  Reason/Outcome: Continuity of Care, Emergency Contact, Referral, Discharge [address]         Is the patient interested in [address] at discharge?: No    DME Referral Provided  Referral made for DME?: No    Other Referral/Resources/Interventions Provided:  Interventions: Short Term Rehab  Referral Comments: [facility] referrals placed per pt's son's request to assess available options  Referrals placed via [facility]  Treatment Team Recommendation: Short Term Rehab  Discharge Destination Plan[de-identified] Short Term Rehab     Transport at Discharge : [facility] Ambulance     Additional Comments: Pt not vaccinated for Covid at this time, first dose administered on 10/4/2022  Pt's son aware this could be a barrier to discharge to STR

## 2022-10-05 LAB
ANION GAP SERPL CALCULATED.3IONS-SCNC: 8 MMOL/L (ref 4–13)
BUN SERPL-MCNC: 11 MG/DL (ref 5–25)
CALCIUM SERPL-MCNC: 8.7 MG/DL (ref 8.3–10.1)
CHLORIDE SERPL-SCNC: 105 MMOL/L (ref 96–108)
CO2 SERPL-SCNC: 27 MMOL/L (ref 21–32)
CREAT SERPL-MCNC: 1.09 MG/DL (ref 0.6–1.3)
ERYTHROCYTE [DISTWIDTH] IN BLOOD BY AUTOMATED COUNT: 15.2 % (ref 11.6–15.1)
GFR SERPL CREATININE-BSD FRML MDRD: 48 ML/MIN/1.73SQ M
GLUCOSE SERPL-MCNC: 100 MG/DL (ref 65–140)
GLUCOSE SERPL-MCNC: 109 MG/DL (ref 65–140)
GLUCOSE SERPL-MCNC: 139 MG/DL (ref 65–140)
GLUCOSE SERPL-MCNC: 59 MG/DL (ref 65–140)
GLUCOSE SERPL-MCNC: 68 MG/DL (ref 65–140)
GLUCOSE SERPL-MCNC: 70 MG/DL (ref 65–140)
HCT VFR BLD AUTO: 41.8 % (ref 34.8–46.1)
HGB BLD-MCNC: 13.5 G/DL (ref 11.5–15.4)
MCH RBC QN AUTO: 31.3 PG (ref 26.8–34.3)
MCHC RBC AUTO-ENTMCNC: 32.3 G/DL (ref 31.4–37.4)
MCV RBC AUTO: 97 FL (ref 82–98)
PLATELET # BLD AUTO: 175 THOUSANDS/UL (ref 149–390)
PMV BLD AUTO: 10.1 FL (ref 8.9–12.7)
POTASSIUM SERPL-SCNC: 3.9 MMOL/L (ref 3.5–5.3)
RBC # BLD AUTO: 4.32 MILLION/UL (ref 3.81–5.12)
SODIUM SERPL-SCNC: 140 MMOL/L (ref 135–147)
TSH SERPL DL<=0.05 MIU/L-ACNC: 0.19 UIU/ML (ref 0.45–4.5)
WBC # BLD AUTO: 8.65 THOUSAND/UL (ref 4.31–10.16)

## 2022-10-05 PROCEDURE — 82948 REAGENT STRIP/BLOOD GLUCOSE: CPT

## 2022-10-05 PROCEDURE — 85027 COMPLETE CBC AUTOMATED: CPT | Performed by: STUDENT IN AN ORGANIZED HEALTH CARE EDUCATION/TRAINING PROGRAM

## 2022-10-05 PROCEDURE — 84443 ASSAY THYROID STIM HORMONE: CPT | Performed by: STUDENT IN AN ORGANIZED HEALTH CARE EDUCATION/TRAINING PROGRAM

## 2022-10-05 PROCEDURE — 97163 PT EVAL HIGH COMPLEX 45 MIN: CPT

## 2022-10-05 PROCEDURE — 92610 EVALUATE SWALLOWING FUNCTION: CPT

## 2022-10-05 PROCEDURE — 97167 OT EVAL HIGH COMPLEX 60 MIN: CPT

## 2022-10-05 PROCEDURE — 99232 SBSQ HOSP IP/OBS MODERATE 35: CPT | Performed by: STUDENT IN AN ORGANIZED HEALTH CARE EDUCATION/TRAINING PROGRAM

## 2022-10-05 PROCEDURE — 80048 BASIC METABOLIC PNL TOTAL CA: CPT | Performed by: STUDENT IN AN ORGANIZED HEALTH CARE EDUCATION/TRAINING PROGRAM

## 2022-10-05 RX ORDER — MIRTAZAPINE 15 MG/1
15 TABLET, FILM COATED ORAL
Status: DISCONTINUED | OUTPATIENT
Start: 2022-10-05 | End: 2022-10-05

## 2022-10-05 RX ORDER — QUETIAPINE FUMARATE 25 MG/1
25 TABLET, FILM COATED ORAL
Status: DISCONTINUED | OUTPATIENT
Start: 2022-10-05 | End: 2022-10-12 | Stop reason: HOSPADM

## 2022-10-05 RX ORDER — LORAZEPAM 2 MG/ML
0.5 INJECTION INTRAMUSCULAR EVERY 6 HOURS PRN
Status: DISCONTINUED | OUTPATIENT
Start: 2022-10-05 | End: 2022-10-05

## 2022-10-05 RX ORDER — DEXTROSE AND SODIUM CHLORIDE 5; .9 G/100ML; G/100ML
75 INJECTION, SOLUTION INTRAVENOUS CONTINUOUS
Status: DISCONTINUED | OUTPATIENT
Start: 2022-10-05 | End: 2022-10-06

## 2022-10-05 RX ORDER — DEXTROSE MONOHYDRATE 25 G/50ML
25 INJECTION, SOLUTION INTRAVENOUS ONCE
Status: COMPLETED | OUTPATIENT
Start: 2022-10-05 | End: 2022-10-05

## 2022-10-05 RX ORDER — LORAZEPAM 0.5 MG/1
0.5 TABLET ORAL EVERY 8 HOURS PRN
Status: DISCONTINUED | OUTPATIENT
Start: 2022-10-05 | End: 2022-10-12 | Stop reason: HOSPADM

## 2022-10-05 RX ORDER — OLANZAPINE 10 MG/1
5 INJECTION, POWDER, LYOPHILIZED, FOR SOLUTION INTRAMUSCULAR EVERY 6 HOURS PRN
Status: DISCONTINUED | OUTPATIENT
Start: 2022-10-05 | End: 2022-10-12 | Stop reason: HOSPADM

## 2022-10-05 RX ORDER — OLANZAPINE 5 MG/1
2.5 TABLET ORAL EVERY 8 HOURS PRN
Status: DISCONTINUED | OUTPATIENT
Start: 2022-10-05 | End: 2022-10-05

## 2022-10-05 RX ORDER — GABAPENTIN 100 MG/1
100 CAPSULE ORAL
Status: DISCONTINUED | OUTPATIENT
Start: 2022-10-05 | End: 2022-10-05

## 2022-10-05 RX ADMIN — PREDNISONE 7.5 MG: 5 TABLET ORAL at 07:57

## 2022-10-05 RX ADMIN — SODIUM CHLORIDE 75 ML/HR: 0.9 INJECTION, SOLUTION INTRAVENOUS at 07:20

## 2022-10-05 RX ADMIN — DEXTROSE AND SODIUM CHLORIDE 75 ML/HR: 5; .9 INJECTION, SOLUTION INTRAVENOUS at 08:15

## 2022-10-05 RX ADMIN — ALLOPURINOL 100 MG: 100 TABLET ORAL at 07:59

## 2022-10-05 RX ADMIN — DEXTROSE MONOHYDRATE 25 ML: 25 INJECTION, SOLUTION INTRAVENOUS at 07:52

## 2022-10-05 RX ADMIN — ATORVASTATIN CALCIUM 10 MG: 10 TABLET, FILM COATED ORAL at 17:38

## 2022-10-05 RX ADMIN — ENOXAPARIN SODIUM 30 MG: 30 INJECTION, SOLUTION INTRAVENOUS; SUBCUTANEOUS at 07:55

## 2022-10-05 RX ADMIN — LAMOTRIGINE 250 MG: 100 TABLET ORAL at 17:38

## 2022-10-05 RX ADMIN — QUETIAPINE FUMARATE 25 MG: 25 TABLET ORAL at 21:53

## 2022-10-05 RX ADMIN — LAMOTRIGINE 250 MG: 100 TABLET ORAL at 07:57

## 2022-10-05 NOTE — SPEECH THERAPY NOTE
Speech Language/Pathology  Speech/Language Pathology  Assessment    Patient Name: Adriana Russ  [id number] Date: 10/5/2022     Problem List  Principal Problem:    Failure to thrive in adult  Active Problems:    Seizures ([facility] )    Essential hypertension    Atrial fibrillation ([facility] )    Chronic systolic heart failure ([facility] )    Neurosarcoidosis    Past Medical History  Past Medical History:   Diagnosis Date   • Disease of thyroid gland    • Hypertension    • Sarcoidosis    • Seizure ([facility] )    • Stroke [facility])      Past Surgical History  Past Surgical History:   Procedure Laterality Date   • APPENDECTOMY     • BRAIN SURGERY     • EYE SURGERY     • HYSTERECTOMY          Bedside Swallow Evaluation:    Summary:  Pt sleeping but alerted for lunch  Noted minimal amt of breakfast was taken  Pt presented w/ mild/mod oral stage, no pharyngeal s/s this session  Limited assessment of soft solids, as pt was only agreeable to 1 tsp of the ground meat and 1 of the chopped broccoli and then refused any more  Edentulous  H/o having an upper plate but none here that I could locate  Prolonged oral manipulation of both  Disliked the mashed potatoes  Only had 1 tsp  Completed all of the vanilla pudding,  and apple juice by straw  w/ adequate lip seal, control, and transfer  Prompt swallow  No cough or wet vocal quality  Nurse noted pt was eating for me  Pt was then given the crushed meds she had refused earlier  Recommendations:  Diet: Level 2 mechanical for now  May need to downgrade  Liquid: thin  Meds: crush for now  Supervision: full  Positioning:Upright  Strategies: pt seems to prefer sweets  Pt to take PO/Meds only when fully alert and upright  Oral care  Aspiration precautions  Reflux precautions  Therapy Prognosis: fair   ? close to baseline  Frequency: ? 2-4 f/u visits    Consider consult w/:  Nutrition    Goal(s):  Dysphagia LTG  -Patient will demonstrate safe and effective oral intake (without overt s/s significant oral/pharyngeal dysphagia including s/s penetration or aspiration) for the highest appropriate diet level  1 Pt will tolerate least restrictive diet w/out s/s aspiration or oral/pharyngeal difficulties  2 Pt will will effectively masticate and transfer solids w/out s/s dysphagia/aspiration  3 Pt will tolerate thin liquids w/out s/s aspiration  H&P/Admit info/ pertinent provider notes: (PMH noted above)  Chief Complaint:   Failure to Thrive  History of Present Illness:  Timur Wasserman is a 76 y o  female who presents with failure to thrive  Past medical history of neurosarcoidosis, seizures, AFib and hypertension  Son brought patient in via EMS, stating that he is no longer able to care for her  He states that over the last several months, he has noticed a decline in her mental status, appetite and activity  He states that she has been difficult to take her medications, has become mainly bed-bound no longer ambulating  Current studies in the ED has been negative, CT head without acute processes  UA has been ordered, x-ray without infection  Labs have been fairly unremarkable  Patient did have slightly worsening in her renal functions  Discussed with son at bedside, requesting that patient try for rehab to see if she can get stronger back to her previous baseline  Case management following at this time  Discussed code status, request that patient is a full code at this time  Special Studies:  cxr  Lungs clear  Trace right effusion, similar to the abdomen   Ct head:  No evidence of acute intracranial process or significant interval change    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 If yes, was nursing notified/was it addressed?     Reason for consult:  R/o aspiration  Determine safest and least restrictive diet  h/o dysphagia   H/o poor intake    Precautions:  Fall  Food allergies:  iodine  Current diet:  Level 2 [facility] soft and thin  Premorbid diet[de-identified]  ? similar  O2 requirement:  none  Voice/Speech:  Mumbles at times, seems to have some word-finding dififuclty  Social/Prior living environment:  Lives w/ children  Follows commands:  poorly                    Cognitive Status:  Alerted for PO  [facility]:  Dentition:edentulous ? If her upper plate is available and if it still fits  Lips (VII):wfl  Tongue (XII):did not follow commands    Mandible (V):wfl  Face/oral sensation (V):unableto assess acurately  Velum (X):wfl  Secretion management:wnl    Esophageal stage:  No s/s reported    Aspiration precautions posted    Results d/w:  Pt, nursing

## 2022-10-05 NOTE — PLAN OF CARE
Problem: Potential for Falls  Goal: Patient will remain free of falls  Description: INTERVENTIONS:  - Educate patient/family on patient safety including physical limitations  - Instruct patient to call for assistance with activity   - Consult OT/PT to assist with strengthening/mobility   - Keep Call bell within reach  - Keep bed low and locked with side rails adjusted as appropriate  - Keep care items and personal belongings within reach  - Initiate and maintain comfort rounds  - Make Fall Risk Sign visible to staff  - Offer Toileting every 2 Hours, in advance of need  - Initiate/Maintain bed alarm  - Obtain necessary fall risk management equipment:   - Apply yellow socks and bracelet for high fall risk patients  - Consider moving patient to room near nurses station  Outcome: Progressing     Problem: Nutrition/Hydration-ADULT  Goal: Nutrient/Hydration intake appropriate for improving, restoring or maintaining nutritional needs  Description: Monitor and assess patient's nutrition/hydration status for malnutrition  Collaborate with interdisciplinary team and initiate plan and interventions as ordered  Monitor patient's weight and dietary intake as ordered or per policy  Utilize nutrition screening tool and intervene as necessary  Determine patient's food preferences and provide high-protein, high-caloric foods as appropriate       INTERVENTIONS:  - Monitor oral intake, urinary output, labs, and treatment plans  - Assess nutrition and hydration status and recommend course of action  - Evaluate amount of meals eaten  - Assist patient with eating if necessary   - Allow adequate time for meals  - Recommend/ encourage appropriate diets, oral nutritional supplements, and vitamin/mineral supplements  - Order, calculate, and assess calorie counts as needed  - Recommend, monitor, and adjust tube feedings and TPN/PPN based on assessed needs  - Assess need for intravenous fluids  - Provide specific nutrition/hydration education as appropriate  - Include patient/family/caregiver in decisions related to nutrition  Outcome: Progressing     Problem: Prexisting or High Potential for Compromised Skin Integrity  Goal: Skin integrity is maintained or improved  Description: INTERVENTIONS:  - Identify patients at risk for skin breakdown  - Assess and monitor skin integrity  - Assess and monitor nutrition and hydration status  - Monitor labs   - Assess for incontinence   - Turn and reposition patient  - Assist with mobility/ambulation  - Relieve pressure over bony prominences  - Avoid friction and shearing  - Provide appropriate hygiene as needed including keeping skin clean and dry  - Evaluate need for skin moisturizer/barrier cream  - Collaborate with interdisciplinary team   - Patient/family teaching  - Consider wound care consult   Outcome: Progressing     Problem: MOBILITY - ADULT  Goal: Maintain or return to baseline ADL function  Description: INTERVENTIONS:  -  Assess patient's ability to carry out ADLs; assess patient's baseline for ADL function and identify physical deficits which impact ability to perform ADLs (bathing, care of mouth/teeth, toileting, grooming, dressing, etc )  - Assess/evaluate cause of self-care deficits   - Assess range of motion  - Assess patient's mobility; develop plan if impaired  - Assess patient's need for assistive devices and provide as appropriate  - Encourage maximum independence but intervene and supervise when necessary  - Involve family in performance of ADLs  - Assess for home care needs following discharge   - Consider OT consult to assist with ADL evaluation and planning for discharge  - Provide patient education as appropriate  Outcome: Progressing

## 2022-10-05 NOTE — UTILIZATION REVIEW
Initial Clinical Review    Admission: Date/Time/Statement:   Admission Orders (From admission, onward)     Ordered        10/04/22 1346  INPATIENT ADMISSION  Once                      Orders Placed This Encounter   Procedures   • INPATIENT ADMISSION     Standing Status:   Standing     Number of Occurrences:   1     Order Specific Question:   Level of Care     Answer:   Med Surg [16]     Order Specific Question:   Estimated length of stay     Answer:   More than 2 Midnights     Order Specific Question:   Certification     Answer:   I certify that inpatient services are medically necessary for this patient for a duration of greater than two midnights  See H&P and MD Progress Notes for additional information about the patient's course of treatment  ED Arrival Information     Expected   -    Arrival   10/4/2022 09:48    Acuity   Urgent            Means of arrival   Ambulance    Escorted by   [facility] ([address])    Service   Hospitalist    Admission type   Emergency            Arrival complaint   Medical Problem           Chief Complaint   Patient presents with   • Failure To Thrive     Pt arrives via ems from home per ems caregiver c/o pt having decreased appetite and not drinking as much  Caregiver with pt for about 2 weeks and said since she has been with her she is getting harder to understand  Pt as hx of stroke with right sided deficits        Initial Presentation: 76 y o  female presents to the ED via EMS from home with c/o decline in mental status, appetite, activity, failure to thrive, bed bound, difficulty with med administration  Son feels he can no longer care for her  PMH: neurosarcoidosis on Prednisone, seizures, chronic sCHF, seizures, Afib, HTN  In the ED she has elevated creat, mild leukocytosis  Imagining is negative for acute disease  She was treated with IV fluids, ASA, Prednisone  On exam she is ill appearing, has irregular rhythm, is disoriented and weak    She is admitted to INPATIENT status with Failure to thrive - therapy evals, IV fluids, CM consult for poss placement  Date: 10/5   Day 2:  Pt is agitated, screaming loudly when awake  Has soft restraints on intermittently  Creat and WBC WNL  Pt requesting ST rehab  Not ready for hospice yet  Continue IV fluids  Starting Mirtazapine and Zyprexa PRN  Holding diuretics  May d/c BP meds on d/c  Continues restraint use for safety  On exam disoriented and weak  Trouble taking meds        ED Triage Vitals   Temperature Pulse Respirations Blood Pressure SpO2   10/04/22 1201 10/04/22 0955 10/04/22 0955 10/04/22 0955 10/04/22 0955   97 7 °F (36 5 °C) 92 17 132/79 100 %      Temp Source Heart Rate Source Patient Position - Orthostatic VS BP Location FiO2 (%)   10/04/22 1201 10/05/22 0738 10/04/22 0955 10/04/22 0955 --   Rectal Monitor Lying Right arm       Pain Score       10/04/22 1351       No Pain          Wt Readings from Last 1 Encounters:   10/05/22 63 9 kg (140 lb 14 oz)     Additional Vital Signs:   10/05/22 1543 97 °F (36 1 °C) Abnormal  84 20 120/62 83 95 % None (Room air) Lying   10/05/22 1139 97 5 °F (36 4 °C) 78 20 129/65 73 97 % None (Room air) Lying     10/05/22 0738 96 °F (35 6 °C) Abnormal  88 20 161/97 108 98 % None (Room air) Lying   10/05/22 0715 -- -- -- -- -- -- None (Room air) --   10/05/22 0331 97 6 °F (36 4 °C) 71 16 140/73 101 98 % None (Room air) Lying   10/04/22 2326 97 7 °F (36 5 °C) 81 20 113/68 86 96 % None (Room air) Lying   10/04/22 2014 -- -- -- -- -- 96 % None (Room air) --   10/04/22 2012 97 5 °F (36 4 °C) 97 -- 121/64 83 82 % Abnormal  None (Room air) Lying   10/04/22 1745 -- -- -- -- -- -- None (Room air) --   10/04/22 1657 97 2 °F (36 2 °C) Abnormal  62 18 137/70 94 98 % None (Room air) Lying   10/04/22 1300 -- 80 18 138/77 101 100 % None (Room air) Lying   10/04/22 1227 99 6 °F (37 6 °C) -- -- -- -- -- -- --     Pertinent Labs/Diagnostic Test Results:     10/4 ECG - Atrial fibrillation  Left bundle branch block  Abnormal ECG    CT head without contrast   Final Result by Benson Amador MD (10/04 1248)      No evidence of acute intracranial process or significant interval change  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  Workstation performed: [id number]         XR chest 1 view portable   Final Result by Dina Park MD (10/04 1106)      Lungs clear  Trace right effusion, similar to the abdomen CT from August 2022                    Workstation performed: [id number]           Results from last 7 days   Lab Units 10/04/22  1102   SARS-COV-2  Negative     Results from last 7 days   Lab Units 10/05/22  0502 10/04/22  1047   WBC Thousand/uL 8 65 10 29*   HEMOGLOBIN g/dL 13 5 13 5   HEMATOCRIT % 41 8 41 7   PLATELETS Thousands/uL 175 178   NEUTROS ABS Thousands/µL  --  5 80         Results from last 7 days   Lab Units 10/05/22  0502 10/04/22  1047   SODIUM mmol/L 140 140   POTASSIUM mmol/L 3 9 4 0   CHLORIDE mmol/L 105 102   CO2 mmol/L 27 31   ANION GAP mmol/L 8 7   BUN mg/dL 11 16   CREATININE mg/dL 1 09 1 45*   EGFR ml/min/1 73sq m 48 34   CALCIUM mg/dL 8 7 8 9     Results from last 7 days   Lab Units 10/04/22  1047   AST U/L 38   ALT U/L 16   ALK PHOS U/L 122*   TOTAL PROTEIN g/dL 7 4   ALBUMIN g/dL 3 4*   TOTAL BILIRUBIN mg/dL 0 83     Results from last 7 days   Lab Units 10/05/22  0835 10/05/22  0738 10/04/22  2104   POC GLUCOSE mg/dl 68 59* 148*     Results from last 7 days   Lab Units 10/05/22  0502 10/04/22  1047   GLUCOSE RANDOM mg/dL 70 75     Results from last 7 days   Lab Units 10/04/22  1325 10/04/22  1047   HS TNI 0HR ng/L  --  13   HS TNI 2HR ng/L 14  --    HSTNI D2 ng/L 1  --              Results from last 7 days   Lab Units 10/05/22  0502 10/04/22  1047   TSH 3RD GENERATON uIU/mL 0 194* 0 287*     Results from last 7 days   Lab Units 10/04/22  1102   INFLUENZA A PCR  Negative   INFLUENZA B PCR  Negative   RSV PCR Negative     ED Treatment:   Medication Administration from 10/04/2022 0947 to 10/04/2022 1649       Date/Time Order Dose Route Action     10/04/2022 1129 sodium chloride 0 9 % bolus 1,000 mL 1,000 mL Intravenous New Bag     10/04/2022 1459 sodium chloride 0 9 % infusion 75 mL/hr Intravenous New Bag     10/04/2022 1459 enoxaparin (LOVENOX) subcutaneous injection 30 mg 30 mg Subcutaneous Given     10/04/2022 1521 aspirin (ECOTRIN LOW STRENGTH) EC tablet 81 mg 81 mg Oral Given     10/04/2022 1521 predniSONE tablet 7 5 mg 7 5 mg Oral Given        Past Medical History:   Diagnosis Date   • Disease of thyroid gland    • Hypertension    • Sarcoidosis    • Seizure ([facility] )    • Stroke [facility])      Present on Admission:  • Essential hypertension  • Atrial fibrillation (HCC)  • Chronic systolic heart failure (HCC)  • Neurosarcoidosis      Admitting Diagnosis: Elevated troponin [R77 8]  Failure to thrive in adult [R62 7]  NADIYA (acute kidney injury) ([facility] ) [N17 9]  Age/Sex: 76 y o  female  Admission Orders:  Scheduled Medications:  allopurinol, 100 mg, Oral, Daily  aspirin, 81 mg, Oral, Every Other Day  atorvastatin, 10 mg, Oral, Daily With Dinner  enoxaparin, 30 mg, Subcutaneous, Daily  famotidine, 20 mg, Oral, Early Morning  gabapentin, 100 mg, Oral, HS  lamoTRIgine, 250 mg, Oral, BID  levothyroxine, 50 mcg, Oral, Early Morning  phenytoin, 100 mg, Oral, Daily  predniSONE, 7 5 mg, Oral, Daily      Continuous IV Infusions:  dextrose 5 % and sodium chloride 0 9 %, 75 mL/hr, Intravenous, Continuous      PRN Meds:  acetaminophen, 650 mg, Oral, Q6H PRN  LORazepam, 0 5 mg, Intravenous, Q6H PRN  OLANZapine, 5 mg, Intramuscular, Q6H PRN  ondansetron, 4 mg, Intravenous, Q6H PRN    Tele  Nonviolent restraints  Dysphagia diet  IP CONSULT TO CASE MANAGEMENT    Network Utilization Review Department  ATTENTION: Please call with any questions or concerns to [phone] and carefully listen to the prompts so that you are directed to the right person  All voicemails are confidential   Basilio Sees all requests for admission clinical reviews, approved or denied determinations and any other requests to dedicated fax number below belonging to the campus where the patient is receiving treatment   List of dedicated fax numbers for the Facilities:  [address] DENIALS (Administrative/Medical Necessity) [phone]   [address] (Maternity/NICU/Pediatrics) [phone]   91 Josephine Lu [phone]   [facility] [phone]   1306 [facility] [address] [place] [address] [address] Yudith Hargrove [facility] 28 [phone]   [address] Buffy Whittaker [facility] 134 815 [facility] [phone]

## 2022-10-05 NOTE — PROGRESS NOTES
[address] [facility]  Progress Note - Kavitha Loge 1945, 76 y o  female MRN: [id number]  Unit/Bed#: E4 -01 Encounter: [id number]  Primary Care Provider: Delma David MD   Date and time admitted to hospital: 10/4/2022  9:48 AM    * Failure to thrive in adult  Assessment & Plan  64-year-old female with past medical history of neurosarcoidosis, seizures, hypertension and AFib presents with failure to thrive  CT head negative for acute processes  Son reporting that over the past several months, patient has had worsening appetite, dysphagia and appears to be confused at times and now mainly bedbound  Patient requesting placement for short-term rehab  Discussed goals of care, today, family agreeable for hospice given decline  Code status discussed, now DNR/DNI  PT, OT and speech therapy  Continue IV fluids with D5 at 75cc/hr  Will start mirtazapine and zyprexa prn    Neurosarcoidosis  Assessment & Plan  Continue home prednisone dose    Chronic systolic heart failure (HCC)  Assessment & Plan  Wt Readings from Last 3 Encounters:   10/05/22 63 9 kg (140 lb 14 oz)   08/31/22 64 8 kg (142 lb 13 7 oz)   08/30/22 69 8 kg (153 lb 14 1 oz)     Continue to hold diuretics, hypovolemic    Atrial fibrillation (HCC)  Assessment & Plan  EKG showing Afib  Not on anticoagulation  Rate control not on AV shelly blockers    Essential hypertension  Assessment & Plan  Hold home BP meds, blood pressure currently in range  May discontinue bp meds on discharge    Seizures ([facility] )  Assessment & Plan  No recent seizures  Continue lamictal and phenytoin    VTE Pharmacologic Prophylaxis:   Pharmacologic: Enoxaparin (Lovenox)  Mechanical VTE Prophylaxis in Place: Yes    Patient Centered Rounds: I have performed bedside rounds with nursing staff today  Discussions with Specialists or Other Care Team Provider: none    Education and Discussions with Family / Patient: son on phone    Time Spent for Care: 30 minutes    More than 50% of total time spent on counseling and coordination of care as described above  Current Length of Stay: 1 day(s)    Current Patient Status: Inpatient   Certification Statement: The patient will continue to require additional inpatient hospital stay due to pending placement for STR vs LTC    Discharge Plan: pending    Code Status: Level 3 - DNAR and DNI       Subjective:   Overnight, agitated requiring restraints  Difficult to take medications but able to work with speech therapy  Objective:     Vitals:   Temp (24hrs), Av 2 °F (36 2 °C), Min:96 °F (35 6 °C), Max:97 7 °F (36 5 °C)    Temp:  [96 °F (35 6 °C)-97 7 °F (36 5 °C)] 97 °F (36 1 °C)  HR:  [62-97] 84  Resp:  [16-20] 20  BP: (113-161)/(62-97) 120/62  SpO2:  [82 %-98 %] 95 %  Body mass index is 29 44 kg/m²  Input and Output Summary (last 24 hours):     No intake or output data in the 24 hours ending 10/05/22 1615    Physical Exam:     Physical Exam  Vitals and nursing note reviewed  Constitutional:       General: She is not in acute distress  Appearance: She is ill-appearing  HENT:      Head: Normocephalic and atraumatic  Eyes:      General: No scleral icterus  Conjunctiva/sclera: Conjunctivae normal    Cardiovascular:      Rate and Rhythm: Normal rate  Rhythm irregular  Pulmonary:      Breath sounds: Normal breath sounds  No wheezing or rhonchi  Abdominal:      General: Bowel sounds are normal  There is no distension  Palpations: Abdomen is soft  Musculoskeletal:         General: No swelling  Right lower leg: No edema  Left lower leg: No edema  Skin:     General: Skin is warm and dry  Neurological:      Mental Status: She is disoriented  Motor: Weakness present         Additional Data:     Labs:    Results from last 7 days   Lab Units 10/05/22  0502 10/04/22  1047   WBC Thousand/uL 8 65 10 29*   HEMOGLOBIN g/dL 13 5 13 5   HEMATOCRIT % 41 8 41 7   PLATELETS Thousands/uL 175 178   NEUTROS PCT % --  57   LYMPHS PCT %  --  29   MONOS PCT %  --  11   EOS PCT %  --  2     Results from last 7 days   Lab Units 10/05/22  0502 10/04/22  1047   SODIUM mmol/L 140 140   POTASSIUM mmol/L 3 9 4 0   CHLORIDE mmol/L 105 102   CO2 mmol/L 27 31   BUN mg/dL 11 16   CREATININE mg/dL 1 09 1 45*   ANION GAP mmol/L 8 7   CALCIUM mg/dL 8 7 8 9   ALBUMIN g/dL  --  3 4*   TOTAL BILIRUBIN mg/dL  --  0 83   ALK PHOS U/L  --  122*   ALT U/L  --  16   AST U/L  --  38   GLUCOSE RANDOM mg/dL 70 75         Results from last 7 days   Lab Units 10/05/22  1537 10/05/22  1129 10/05/22  0835 10/05/22  0738 10/04/22  2104   POC GLUCOSE mg/dl 139 109 68 59* 148*                   * I Have Reviewed All Lab Data Listed Above  * Additional Pertinent Lab Tests Reviewed: All Labs For Current Hospital Admission Reviewed    Imaging:    XR chest 1 view portable    Result Date: 10/4/2022  Impression: Lungs clear  Trace right effusion, similar to the abdomen CT from August 2022  Workstation performed: [id number]     CT head without contrast    Result Date: 10/4/2022  Impression: No evidence of acute intracranial process or significant interval change  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   Workstation performed: [id number]       Recent Cultures (last 7 days):           Last 24 Hours Medication List:   Current Facility-Administered Medications   Medication Dose Route Frequency Provider Last Rate   • acetaminophen  650 mg Oral Q6H PRN Alex Roman MD     • allopurinol  100 mg Oral Daily Alex Roman MD     • aspirin  81 mg Oral Every Other Day Alex Roman MD     • atorvastatin  10 mg Oral Daily With Brit Menezes MD     • dextrose 5 % and sodium chloride 0 9 %  75 mL/hr Intravenous Continuous Alex Roman MD Stopped (10/05/22 1445)   • enoxaparin  30 mg Subcutaneous Daily Alex Roman MD     • famotidine  20 mg Oral Early Morning Alex Roman MD     • gabapentin  100 mg Oral HS Alex Roman MD     • lamoTRIgine  250 mg Oral BID Carline Stephenson MD     • levothyroxine  50 mcg Oral Early Morning Carline Stephenson MD     • LORazepam  0 5 mg Intravenous Q6H PRN Carline Stephenson MD     • OLANZapine  5 mg Intramuscular Q6H PRN Carline Stephenson MD     • ondansetron  4 mg Intravenous Q6H PRN Carline Stephenson MD     • phenytoin  100 mg Oral Daily Carline Stephenson MD     • predniSONE  7 5 mg Oral Daily Carline Stephenson MD          Today, Patient Was Seen By: Carline Stephenson    ** Please Note: Dictation voice to text software may have been used in the creation of this document   **

## 2022-10-05 NOTE — NURSING NOTE
Pt continues on L hand soft restraints  Pt not following command, falls sleep quickly and wakes up frequently  Pt agitated and screaming loudly while awake and while providing care  Attempts to pull the IV access on restraint reassessment  Pt refused AM medication  Bed locked in low position and bed alarm in place for safety  Pt currently resting  Will continue to monitor

## 2022-10-05 NOTE — ASSESSMENT & PLAN NOTE
57-year-old female with past medical history of neurosarcoidosis, seizures, hypertension and AFib presents with failure to thrive  CT head negative for acute processes  Son reporting that over the past several months, patient has had worsening appetite, dysphagia and appears to be confused at times and now mainly bedbound  Patient requesting placement for short-term rehab  Discussed goals of care, family not yet ready for hospice, continues to be full code  PT, OT and speech therapy  Continue IV fluids with D5 at 75cc/hr  Will start mirtazapine and zyprexa prn

## 2022-10-05 NOTE — ASSESSMENT & PLAN NOTE
Wt Readings from Last 3 Encounters:   10/05/22 63 9 kg (140 lb 14 oz)   08/31/22 64 8 kg (142 lb 13 7 oz)   08/30/22 69 8 kg (153 lb 14 1 oz)     Continue to hold diuretics, hypovolemic

## 2022-10-05 NOTE — OCCUPATIONAL THERAPY NOTE
Occupational Therapy Evaluation     Patient Name: Ericka Marsh  [facility]'S Date: 10/5/2022  Problem List  Principal Problem:    Failure to thrive in adult  Active Problems:    Seizures ([address] )    Essential hypertension    Atrial fibrillation ([facility] [address] )    Chronic systolic heart failure ([facility] [address] )    Neurosarcoidosis    Past Medical History  Past Medical History:   Diagnosis Date    Disease of thyroid gland     Hypertension     Sarcoidosis     Seizure ([facility] [address] )     Stroke [facility])      Past Surgical History  Past Surgical History:   Procedure Laterality Date    APPENDECTOMY      BRAIN SURGERY      EYE SURGERY      HYSTERECTOMY             10/05/22 0826   OT Last Visit   OT Visit Date 10/05/22   Note Type   Note type Evaluation   Restrictions/Precautions   Weight Bearing Precautions Per Order No   Other Precautions Cognitive; Chair Alarm; Bed Alarm; Restraints;Agitated;Multiple lines; Fall Risk;Combative  (L soft wrist restraint)   Pain Assessment   Pain Assessment Tool FLACC   Pain Location/Orientation Location: Generalized   Pain Rating: FLACC (Rest) - Face 0   Pain Rating: FLACC (Rest) - Legs 0   Pain Rating: FLACC (Rest) - Activity 0   Pain Rating: FLACC (Rest) - Cry 0   Pain Rating: FLACC (Rest) - Consolability 0   Score: FLACC (Rest) 0   Pain Rating: FLACC (Activity) - Face 1   Pain Rating: FLACC (Activity) - Legs 1   Pain Rating: FLACC (Activity) - Activity 1   Pain Rating: FLACC (Activity) - Cry 1   Pain Rating: FLACC (Activity) - Consolability 1   Score: FLACC (Activity) 5   Home Living   Type of Home House   Home Layout Two level;Performs ADLs on one level;Stairs to enter with rails  (1 RIO, sleeps on couch on 1st floor and uses BSC)   Bathroom Shower/Tub   (sponge bathes at baseline)   Bathroom Toilet   (use of BSC)   Bathroom Equipment Commode   Home Equipment Walker;Cane;Wheelchair-manual   Additional Comments Pt is poor historian and non-verbal during eval, info on home setup and PLOF obtained via chart   Pt lives with son in a two level house with 1st floor setup  Per previous notes: Pt sleeps on couch on 1st floor and uses BSC  Son is pt's caregiver  Prior Function   Level of [place] Needs assistance with ADLs and functional mobility; Needs assistance with IADLs   Lives With Son  (primary caregiver)   Receives Help From Family   ADL Assistance Needs assistance   IADLs Needs assistance   Falls in the last 6 months 1 to 4   Vocational Retired   Comments At baseline, pt required assist w/ ADLs, IADLs, and functional transfers  Per OT eval on 8/31/22, pt required Ax2 for stand pivot transfers  (+) fall PTA  Lifestyle   Autonomy At baseline, pt required assist w/ ADLs, IADLs, and functional transfers  Per OT eval on 8/31/22, pt required Ax2 for stand pivot transfers  (+) fall PTA  Reciprocal Relationships Son   Service to Others Retired   ADL   Where Assessed Edge of bed   Eating Assistance 2  Maximal Assistance   Grooming Assistance 2  Maximal Assistance   [address] 2  Maximal Assistance   LB [address] 1  Total Assistance   [address] 2  Maximal [address] 1  Total [address]  1  Total [address] 1  Total Assistance   Bed Mobility   Supine to Sit 1  Dependent   Additional items Assist x 2;HOB elevated; Increased time required;Verbal cues;LE management   Sit to Supine 2  Maximal assistance   Additional items Assist x 2; Increased time required;Verbal cues;LE management   Additional Comments Mod A required to maintain upright seated position while seated EOB with assist of 2nd person to prevent pt from pulling her IV out w/ L UE  Pt lying supine with bed alarm activated at end of session  L hand soft restraints in place  All needs met and pt reports no further questions for OT at this time  Transfers   Sit to Stand 2  Maximal assistance   Additional items Assist x 2; Increased time required;Verbal cues   Stand to Sit 2  Maximal assistance   Additional items Assist x 2; Increased time required;Verbal cues   Functional Mobility   Functional Mobility 2  Maximal assistance   Additional Comments Assist x2 to laterally step towards [facility]   Additional items Hand hold assistance   Balance   Static Sitting Poor +   Dynamic Sitting Poor   Static Standing Poor   Dynamic Standing Poor -   Ambulatory Poor -   Activity Tolerance   Activity Tolerance Treatment limited secondary to medical complications (Comment); Treatment limited secondary to agitation   Medical Staff Made Aware Vladislav, PT   Nurse Made Aware yes; EVE Coy   RUE Assessment   RUE Assessment X  (no AROM observed; Hx of R residual weaknes sper chart  PROM=WFL)   LUE Assessment   LUE Assessment WFL  (ROM=grossly WFL as observed w/ functional tasks; Pt unable to follow commands for ROM assessment)   Hand Function   Gross Motor Coordination   (L=functional, R=impaired)   Fine Motor Coordination   (L=functional, R=impaired)   Cognition   Overall Cognitive Status Impaired   Arousal/Participation Alert   Attention GABY   Orientation Level Unable to assess   Memory Unable to assess   Following Commands Unable to follow one step commands   Comments Pt combative at time of eval, pulling at IV lines  Unable to follow commands, will continue to assess cognition   Assessment   Limitation Decreased ADL status; Decreased UE strength;Decreased UE ROM; Decreased Safe judgement during ADL;Decreased cognition;Decreased endurance;Decreased self-care trans;Decreased high-level ADLs; Decreased fine motor control;Non-func R UE   Prognosis Poor   Assessment Pt is a 76 y o  female seen for OT evaluation s/p adm to [facility] on 10/4/2022 w/ Failure to thrive in adult   Comorbidities affecting pt’s functional performance include a significant PMH of A-Fib, HTN, Neurosarcoidosis, Seizure, and Stroke  Pt with active OT orders and activity orders for Up and OOB as tolerated    Pt is poor historian and non-verbal during eval, info on home setup and PLOF obtained via chart  Pt lives with son in a two level house with 1st floor setup  Per previous notes: Pt sleeps on couch on 1st floor and uses BSC  Son is pt's caregiver  At baseline, pt required assist w/ ADLs, IADLs, and functional transfers  Per OT eval on 22, pt required Ax2 for stand pivot transfers  (+) fall PTA  Upon evaluation, pt currently requires Max A for UB ADLs, Total A for LB ADLs, Total A for toileting, Dependent Ax2 for bed mobility, and Max A of 2 for functional mobility/transfers 2* the following deficits impacting occupational performance: decreased ROM, decreased strength, decreased balance, decreased tolerance, impaired [address], impaired [address] [place], impaired problem solving, impulsivity, decreased safety awareness and increased pain  These impairments, as well at pt’s steps to enter environment, difficulty performing ADLS, limited insight into deficits and decreased initiation and engagement  limit pt’s ability to safely engage in all baseline areas of occupation  Based on the aforementioned OT evaluation, functional performance deficits, and assessments, pt has been identified as a High complexity evaluation  Pt to continue to benefit from continued acute OT services during hospital stay to address defined deficits and to maximize level of functional independence in the following Occupational Performance areas: eating, grooming, bathing/shower, toilet hygiene, dressing, health maintenance, functional mobility, clothing management and social participation  From OT standpoint, recommend STR vs LTC placement upon D/C  OT will continue to follow pt 2-3x/wk to address the following goals to  w/in 10-14 days:   Goals   Patient Goals None stated 2* cognitive deficits   LTG Time Frame 10-14   Long Term Goal Please refer to LTGs listed below   Plan   Treatment Interventions ADL retraining;Functional transfer training;UE strengthening/ROM; Endurance training;Patient/family training;Equipment evaluation/education; Compensatory technique education;Continued evaluation; Activityengagement   Goal Expiration Date 10/19/22   OT Treatment Day 0   OT Frequency 2-3x/wk   Recommendation   OT Discharge Recommendation Post acute rehabilitation services  (vs LTC pending progress)   Additional Comments  The patient's raw score on the AM-PAC Daily Activity inpatient short form low function score is 12, standardized score is Low Function Daily Activity Standardized Score: 21 38  Patients with a standardized score less than 39 4 are likely to benefit from discharge to post-acute rehab services  Please refer to the recommendation of the Occupational Therapist for safe discharge planning     AM-PAC Daily Activity Inpatient   Lower Body Dressing 1   Bathing 1   Toileting 1   Upper Body Dressing 2   Grooming 2   Eating 2   Daily Activity Raw Score 9   Turning Head Towards Sound 2   Follow Simple Instructions 1   Low Function Daily Activity Raw Score 12   Low Function Daily Activity Standardized Score 21 38   AM-PAC Applied Cognition Inpatient   Following a Speech/Presentation 1   Understanding Ordinary Conversation 1   Taking Medications 1   Remembering Where Things Are Placed or Put Away 1   Remembering List of 4-5 Errands 1   Taking Care of Complicated Tasks 1   Applied Cognition Raw Score 6   Applied Cognition Standardized Score 7 69       GOALS    Pt will improve activity tolerance to G for min 30 min txment sessions for increase engagement in functional tasks    Pt will complete bed mobility at a Mod A level w/ G balance/safety demonstrated to decrease caregiver assistance required     Pt will increase seated tolerance to 10-15 with Fair dynamic seated balance to increase safety during participation in ADLs     Pt will complete UB dressing/self care w/ min A using adaptive device and DME as needed     Pt will complete LB dressing/self care w/ mod A using adaptive device and DME as needed    Pt will complete toileting w/ mod A w/ G hygiene/thoroughness using DME as needed    Pt will improve functional transfers to Mod A on/off all surfaces using DME as needed w/ G balance/safety     Pt will improve functional mobility during ADL/IADL/leisure tasks to Mod A using DME as needed w/ G balance/safety     Pt will be attentive 100% of the time during ongoing cognitive assessment w/ G participation to assist w/ safe d/c planning/recommendations    Pt will increase BUE strength by 1MM grade via AROM/AAROM/PROM exercises to increase independence in ADLs and transfers      Pt will increase standing tolerance to 3-5 mins with Poor+ dynamic standing balance to increase safety during participation in ADLs    Pt will follow 50% simple one step verbal commands to increase participation in therapy sessions         Emily Kilpatrick OTR/L

## 2022-10-05 NOTE — PLAN OF CARE
Problem: OCCUPATIONAL THERAPY ADULT  Goal: Performs self-care activities at highest level of function for planned discharge setting  See evaluation for individualized goals  Description: Treatment Interventions: ADL retraining, Functional transfer training, UE strengthening/ROM, Endurance training, Patient/family training, Equipment evaluation/education, Compensatory technique education, Continued evaluation, Activityengagement          See flowsheet documentation for full assessment, interventions and recommendations  Note: Limitation: Decreased ADL status, Decreased UE strength, Decreased UE ROM, Decreased Safe judgement during ADL, Decreased cognition, Decreased endurance, Decreased self-care trans, Decreased high-level ADLs, Decreased fine motor control, Non-func R UE  Prognosis: Poor  Assessment: Pt is a 76 y o  female seen for OT evaluation s/p adm to [facility] on 10/4/2022 w/ Failure to thrive in adult   Comorbidities affecting pt’s functional performance include a significant PMH of A-Fib, HTN, Neurosarcoidosis, Seizure, and Stroke  Pt with active OT orders and activity orders for Up and OOB as tolerated   Pt is poor historian and non-verbal during eval, info on home setup and PLOF obtained via chart  Pt lives with son in a two level house with 1st floor setup  Per previous notes: Pt sleeps on couch on 1st floor and uses BSC  Son is pt's caregiver  At baseline, pt required assist w/ ADLs, IADLs, and functional transfers  Per OT eval on 8/31/22, pt required Ax2 for stand pivot transfers  (+) fall PTA   Upon evaluation, pt currently requires Max A for UB ADLs, Total A for LB ADLs, Total A for toileting, Dependent Ax2 for bed mobility, and Max A of 2 for functional mobility/transfers 2* the following deficits impacting occupational performance: decreased ROM, decreased strength, decreased balance, decreased tolerance, impaired [address], impaired [address] [place], impaired problem solving, impulsivity, decreased safety awareness and increased pain  These impairments, as well at pt’s steps to enter environment, difficulty performing ADLS, limited insight into deficits and decreased initiation and engagement  limit pt’s ability to safely engage in all baseline areas of occupation  Based on the aforementioned OT evaluation, functional performance deficits, and assessments, pt has been identified as a High complexity evaluation  Pt to continue to benefit from continued acute OT services during hospital stay to address defined deficits and to maximize level of functional independence in the following Occupational Performance areas: eating, grooming, bathing/shower, toilet hygiene, dressing, health maintenance, functional mobility, clothing management and social participation  From OT standpoint, recommend STR vs LTC placement upon D/C   OT will continue to follow pt 2-3x/wk to address the following goals to  w/in 10-14 days:     OT Discharge Recommendation: Post acute rehabilitation services (vs LTC pending progress)

## 2022-10-05 NOTE — PHYSICAL THERAPY NOTE
PT EVALUATION    Pt  Name: Camilla Robert  Pt  Age: 76 y o  MRN: [id number]  LENGTH OF STAY: 1      Admitting Diagnoses:   Elevated troponin [R77 8]  Failure to thrive in adult [R62 7]  NADIYA (acute kidney injury) ([facility] [address] ) [N17 9]    Past Medical History:   Diagnosis Date    Disease of thyroid gland     Hypertension     Sarcoidosis     Seizure ([facility] [address] )     Stroke [facility])        Past Surgical History:   Procedure Laterality Date    APPENDECTOMY      BRAIN SURGERY      EYE SURGERY      HYSTERECTOMY         Imaging Studies:  CT head without contrast   Final Result by Ga Zurita MD (10/04 1248)      No evidence of acute intracranial process or significant interval change  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  Workstation performed: [id number]         XR chest 1 view portable   Final Result by Prateek Barnett MD (10/04 1106)      Lungs clear  Trace right effusion, similar to the abdomen CT from August 2022  Workstation performed: [id number]               10/05/22 0827   PT Last Visit   PT Visit Date 10/05/22   Note Type   Note type Evaluation   Pain Assessment   Pain Assessment Tool FLACC   Pain Rating: FLACC (Rest) - Face 0   Pain Rating: FLACC (Rest) - Legs 0   Pain Rating: FLACC (Rest) - Activity 0   Pain Rating: FLACC (Rest) - Cry 0   Pain Rating: FLACC (Rest) - Consolability 0   Score: FLACC (Rest) 0   Pain Rating: FLACC (Activity) - Face 1   Pain Rating: FLACC (Activity) - Legs 1   Pain Rating: FLACC (Activity) - Activity 1   Pain Rating: FLACC (Activity) - Cry 1   Pain Rating: FLACC (Activity) - Consolability 1   Score: FLACC (Activity) 5   Restrictions/Precautions   Weight Bearing Precautions Per Order No   Other Precautions Cognitive; Chair Alarm; Bed Alarm; Restraints;Multiple lines;Agitated;Combative; Fall Risk  (L soft wrist restraint)   Home Living   Type of [address] [place] [address] Two level;Stairs to enter with rails; Performs ADLs on one level; Other (Comment)  (1STE; 1st flr set up; sleeps on the couch; BSC on 1st flr)   Bathroom Shower/Tub   (sponge bathes at baseline)   Bathroom Toilet   (uses BSC on 1st flr)   [address];Cane;Wheelchair-manual   Additional Comments Pt poor historian, unable to provide any history; Above information gathered from chart  Prior Function   Level of [place] Needs assistance with ADLs and functional mobility  (per chart)   Lives With Son  (primary caregiver)   Receives Help From Family   ADL Assistance Needs assistance  (per chart)   Falls in the last 6 months 1 to 4   Comments Pt poor historian, unable to provide any history; Above information gathered from chart  Per chart, pt nonambulatory at baseline but able to pivot transfer to her w/c w/ assistance  General   Family/Caregiver Present No   Cognition   Overall Cognitive Status Impaired   Arousal/Participation Arousable   Orientation Level Disoriented X4   Following Commands Unable to follow one step commands   Comments pt can be combative; pulling lines; emotionally labile   Subjective   Subjective RN cleared to attempt PT/OT evals  RUE Assessment   RUE Assessment   (refer to OT)   LUE Assessment   LUE Assessment   (refer to OT)   RLE Assessment   RLE Assessment X  (unable to formally assess MMT as pt unable to follow commands; [facility]; MMT appears to be at least 2+/5 grossly)   LLE Assessment   LLE Assessment X  (unable to formally assess MMT as pt unable to follow commands; [facility]; MMT appears to be at least 2+/5 grossly)   Bed Mobility   Supine to Sit 1  Dependent   Additional items Assist x 2;HOB elevated; Increased time required;LE management;Verbal cues   Sit to Supine 1  Dependent   Additional items Assist x 2; Increased time required;Verbal cues;LE management   Additional Comments pt require modAx1 to maintain sitting balance at EOB + assist of another to hold LUE down as pt kept trying to pull her IV line out   Transfers   Sit to Stand 2  Maximal assistance   Additional items Assist x 2; Increased time required;Verbal cues   Stand to Sit 2  Maximal assistance   Additional items Assist x 2; Increased time required;Verbal cues   Additional Comments cues for techniques & safety; require holding LUE down to keep pt from pulling her IV line   Ambulation/Elevation   Gait pattern Wide GREG; Decreased foot clearance;Shuffling; Short stride; Step to;Excessively slow; Forward Flexion;Knees flexed;Decreased hip extension;Decreased heel strike   Gait Assistance 2  Maximal assist   Additional items Assist x 2;Verbal cues; Tactile cues   Assistive Device Other (Comment)  (B/L hand hold assistance)   Distance 2'x1 lateral steps to [facility]   Ambulation/Elevation Additional Comments unsteady gait; cues for techniques & safety   Balance   Static Sitting Poor +   Dynamic Sitting Poor   Static Standing Poor   Dynamic Standing Poor -   Ambulatory Poor -   Activity Tolerance   Activity Tolerance Treatment limited secondary to medical complications (Comment); Treatment limited secondary to agitation   Medical Staff [facility]   Nurse Made Aware RN Anneliese   Assessment   Prognosis Guarded   Problem List Decreased strength;Decreased endurance; Impaired balance;Decreased mobility; Decreased cognition; Impaired judgement;Decreased safety awareness   Assessment Pt  76 y  o female admitted for Failure to thrive in adult & afib  Pt referred to PT for mobility assessment & D/C planning  Please see above for information re: home set-up & PLOF as well as objective findings during PT assessment  On eval, pt functioning below baseline hence will continue skilled PT to improve function & safety  Pt require dependentx2 for bed mobility & maxAx2 for transfers & amb + cues for techniques & safety  Gait deviations as above but no gross LOB noted  Pt w/ severely impaired cognition, disoriented x4, can be combative & emotionally labile   The patient's AM-PAC Basic Mobility Inpatient Short Form Raw Score is 6  A Raw score of less than or equal to 16 suggests the patient may benefit from discharge to post-acute rehabilitation services  Please also refer to the recommendation of the Physical Therapist for safe discharge planning  From PT standpoint, due to above mentioned deficits & high risk for falls, pt will benefit from inpt rehab at D/C  Pt may need LTC placement if not making progress in PT  Will continue to monitor progress  No SOB & dizziness reported t/o session  Nsg staff most recent vital signs as follows: /65 (BP Location: Left leg)   Pulse 78   Temp 97 5 °F (36 4 °C) (Temporal)   Resp 20   Wt 63 9 kg (140 lb 14 oz)   SpO2 97%   BMI 29 44 kg/m²   At end of session, pt back in bed in stable condition, call bell & phone in reach, bed alarm activated, all lines intact, L soft wrist restraint reapplied  Fall precautions reinforced w/ good understanding  CM to follow  Nsg staff to continue to mobilized pt (via napoleon lift) as tolerated to prevent further decline in function  Nsg notified  Co-eval was necessary to complete this PT eval for the pt's best interest given pt's medical acuity & complexity     Goals   Patient Goals none stated 2* to impaired cognition   STG Expiration Date 10/19/22   Short Term Goal #1 Goals to be met in 14 days; pt will be able to: 1) inc strength & balance by 1/2 grade to improve overall functional mobility & dec fall risk; 2) inc bed mobility to modAx1 for pt to be able to get in/OOB safely w/ proper techniques 100% of the time, to dec caregiver burden & safely function at home; 3) inc transfers to modAx1 for pt to transition safely from one surface to another w/o % of the time, to dec caregiver burden & safely function at home; 4) inc amb w/ appropriate AD approx  >10'x1 w/ modAx1 for pt to ambulate as tolerated w/o any % of the time, to dec caregiver burden & safely function at home; 5) minAx1 for w/c mobility & mgt on level surface approx  150'; 6) pt/caregiver ed   PT Treatment Day 0   Plan   Treatment/Interventions Functional transfer training;LE strengthening/ROM; Therapeutic exercise; Endurance training;Patient/family training;Bed mobility;Gait training;Spoke to nursing;OT;Spoke to case management   PT Frequency 2-3x/wk   Recommendation   PT Discharge Recommendation Post acute rehabilitation services  (STR vs LTC placement, pending progress)   Equipment Recommended Other (Comment)  (monitor)   AM-PAC Basic Mobility Inpatient   Turning in Bed Without Bedrails 1   Lying on Back to Sitting on Edge of Flat Bed 1   Moving Bed to Chair 1   Standing Up From Chair 1   Walk in Room 1   Climb 3-5 Stairs 1   Basic Mobility Inpatient Raw Score 6   Highest Level Of Mobility   -HLM Goal 2: Bed activities/Dependent transfer   -HL Achieved 3: Sit at edge of bed   End of Consult   Patient Position at End of Consult Supine;Bed/Chair alarm activated; All needs within reach   End of Consult Comments Pt in stable condition  All needs in reach  All lines intact  Bed alarm activated  L soft wrist restraint reapplied     Hx/personal factors: co-morbidities, inaccessible home, advanced age, mutliple lines, use of AD, dec cognition, pain, h/o of falls, fall risk, and assist w/ ADL's  Examination: dec mobility, dec balance, dec endurance, dec amb, risk for falls, pain, dec cognition  Clinical: unpredictable (ongoing medical status, abnormal lab values, and risk for falls)  Complexity: high    [employer]

## 2022-10-05 NOTE — PLAN OF CARE
Problem: PHYSICAL THERAPY ADULT  Goal: Performs mobility at highest level of function for planned discharge setting  See evaluation for individualized goals  Description: Treatment/Interventions: Functional transfer training, LE strengthening/ROM, Therapeutic exercise, Endurance training, Patient/family training, Bed mobility, Gait training, Spoke to nursing, OT, Spoke to case management  Equipment Recommended: Other (Comment) (monitor)       See flowsheet documentation for full assessment, interventions and recommendations  Note: Prognosis: Guarded  Problem List: Decreased strength, Decreased endurance, Impaired balance, Decreased mobility, Decreased cognition, Impaired judgement, Decreased safety awareness  Assessment: Pt  76 y  o female admitted for Failure to thrive in adult & afib  Pt referred to PT for mobility assessment & D/C planning  Please see above for information re: home set-up & PLOF as well as objective findings during PT assessment  On eval, pt functioning below baseline hence will continue skilled PT to improve function & safety  Pt require dependentx2 for bed mobility & maxAx2 for transfers & amb + cues for techniques & safety  Gait deviations as above but no gross LOB noted  Pt w/ severely impaired cognition, disoriented x4, can be combative & emotionally labile  The patient's AM-PAC Basic Mobility Inpatient Short Form Raw Score is 6  A Raw score of less than or equal to 16 suggests the patient may benefit from discharge to post-acute rehabilitation services  Please also refer to the recommendation of the Physical Therapist for safe discharge planning  From PT standpoint, due to above mentioned deficits & high risk for falls, pt will benefit from inpt rehab at D/C  Pt may need LTC placement if not making progress in PT  Will continue to monitor progress  No SOB & dizziness reported t/o session   Nsg staff most recent vital signs as follows: /65 (BP Location: Left leg)   Pulse 78   Temp 97 5 °F (36 4 °C) (Temporal)   Resp 20   Wt 63 9 kg (140 lb 14 oz)   SpO2 97%   BMI 29 44 kg/m²   At end of session, pt back in bed in stable condition, call bell & phone in reach, bed alarm activated, all lines intact, L soft wrist restraint reapplied  Fall precautions reinforced w/ good understanding  CM to follow  Nsg staff to continue to mobilized pt (via napoleon lift) as tolerated to prevent further decline in function  Nsg notified  Co-eval was necessary to complete this PT eval for the pt's best interest given pt's medical acuity & complexity  PT Discharge Recommendation: Post acute rehabilitation services (STR vs LTC placement, pending progress)    See flowsheet documentation for full assessment

## 2022-10-05 NOTE — CASE MANAGEMENT
Case Management Discharge Planning Note    Patient name Mariajose Wilkerson  Location [facility] /E4 [facility]-* MRN [id number]  : 1945 Date 10/5/2022       Current Admission Date: 10/4/2022  Current Admission Diagnosis:Failure to thrive in adult   Patient Active Problem List    Diagnosis Date Noted   • Failure to thrive in adult 10/04/2022   • Seizures ([facility] [address] ) 2021   • Stroke ([facility] [address] ) 2021   • Essential hypertension 2021   • Hypothyroidism 2021   • Hyperlipidemia 2021   • Atrial fibrillation ([facility] [address] ) 2021   • Chronic systolic heart failure ([facility] [address] ) 2021   • Neurosarcoidosis 2021      LOS (days): 1  Geometric Mean LOS (GMLOS) (days): 3 10  Days to GMLOS:2     OBJECTIVE:  Risk of Unplanned Readmission Score: 19 94         Current admission status: Inpatient   Preferred Pharmacy:   [facility] [id number], [address]  Phone: [phone] Fax: [phone]    Primary Care Provider: Alia Rivera MD    Primary Insurance: [facility]  Secondary Insurance:     DISCHARGE DETAILS:                                 Additional Comments: Rec a consult stating pt justed started with [facility] [employer] at home for 3x a week for 4 hours  MD reports son can no longer manage pt at home  Pt had her first COVID dose 10/4/2022  PT/OT are recommending IP Rehab  CM in ED started referrals  Will print a list and discuss with son

## 2022-10-06 LAB
ANION GAP SERPL CALCULATED.3IONS-SCNC: 8 MMOL/L (ref 4–13)
BASOPHILS # BLD AUTO: 0.08 THOUSANDS/ÂΜL (ref 0–0.1)
BASOPHILS NFR BLD AUTO: 1 % (ref 0–1)
BUN SERPL-MCNC: 8 MG/DL (ref 5–25)
CALCIUM SERPL-MCNC: 8.6 MG/DL (ref 8.3–10.1)
CHLORIDE SERPL-SCNC: 108 MMOL/L (ref 96–108)
CO2 SERPL-SCNC: 27 MMOL/L (ref 21–32)
CREAT SERPL-MCNC: 1 MG/DL (ref 0.6–1.3)
EOSINOPHIL # BLD AUTO: 0.23 THOUSAND/ÂΜL (ref 0–0.61)
EOSINOPHIL NFR BLD AUTO: 3 % (ref 0–6)
ERYTHROCYTE [DISTWIDTH] IN BLOOD BY AUTOMATED COUNT: 15.5 % (ref 11.6–15.1)
GFR SERPL CREATININE-BSD FRML MDRD: 53 ML/MIN/1.73SQ M
GLUCOSE SERPL-MCNC: 105 MG/DL (ref 65–140)
GLUCOSE SERPL-MCNC: 117 MG/DL (ref 65–140)
GLUCOSE SERPL-MCNC: 73 MG/DL (ref 65–140)
GLUCOSE SERPL-MCNC: 86 MG/DL (ref 65–140)
HCT VFR BLD AUTO: 39.2 % (ref 34.8–46.1)
HGB BLD-MCNC: 12.5 G/DL (ref 11.5–15.4)
IMM GRANULOCYTES # BLD AUTO: 0.03 THOUSAND/UL (ref 0–0.2)
IMM GRANULOCYTES NFR BLD AUTO: 0 % (ref 0–2)
LYMPHOCYTES # BLD AUTO: 1.8 THOUSANDS/ÂΜL (ref 0.6–4.47)
LYMPHOCYTES NFR BLD AUTO: 24 % (ref 14–44)
MCH RBC QN AUTO: 31.1 PG (ref 26.8–34.3)
MCHC RBC AUTO-ENTMCNC: 31.9 G/DL (ref 31.4–37.4)
MCV RBC AUTO: 98 FL (ref 82–98)
MONOCYTES # BLD AUTO: 0.7 THOUSAND/ÂΜL (ref 0.17–1.22)
MONOCYTES NFR BLD AUTO: 10 % (ref 4–12)
NEUTROPHILS # BLD AUTO: 4.54 THOUSANDS/ÂΜL (ref 1.85–7.62)
NEUTS SEG NFR BLD AUTO: 62 % (ref 43–75)
NRBC BLD AUTO-RTO: 0 /100 WBCS
PLATELET # BLD AUTO: 177 THOUSANDS/UL (ref 149–390)
PMV BLD AUTO: 10.2 FL (ref 8.9–12.7)
POTASSIUM SERPL-SCNC: 3.8 MMOL/L (ref 3.5–5.3)
RBC # BLD AUTO: 4.02 MILLION/UL (ref 3.81–5.12)
SODIUM SERPL-SCNC: 143 MMOL/L (ref 135–147)
WBC # BLD AUTO: 7.38 THOUSAND/UL (ref 4.31–10.16)

## 2022-10-06 PROCEDURE — 80048 BASIC METABOLIC PNL TOTAL CA: CPT | Performed by: STUDENT IN AN ORGANIZED HEALTH CARE EDUCATION/TRAINING PROGRAM

## 2022-10-06 PROCEDURE — 99232 SBSQ HOSP IP/OBS MODERATE 35: CPT | Performed by: STUDENT IN AN ORGANIZED HEALTH CARE EDUCATION/TRAINING PROGRAM

## 2022-10-06 PROCEDURE — 85025 COMPLETE CBC W/AUTO DIFF WBC: CPT | Performed by: STUDENT IN AN ORGANIZED HEALTH CARE EDUCATION/TRAINING PROGRAM

## 2022-10-06 PROCEDURE — 82948 REAGENT STRIP/BLOOD GLUCOSE: CPT

## 2022-10-06 RX ADMIN — ATORVASTATIN CALCIUM 10 MG: 10 TABLET, FILM COATED ORAL at 17:28

## 2022-10-06 RX ADMIN — PREDNISONE 7.5 MG: 5 TABLET ORAL at 09:01

## 2022-10-06 RX ADMIN — ENOXAPARIN SODIUM 30 MG: 30 INJECTION, SOLUTION INTRAVENOUS; SUBCUTANEOUS at 09:03

## 2022-10-06 RX ADMIN — LAMOTRIGINE 250 MG: 100 TABLET ORAL at 09:01

## 2022-10-06 RX ADMIN — ASPIRIN 81 MG: 81 TABLET, COATED ORAL at 09:01

## 2022-10-06 RX ADMIN — ALLOPURINOL 100 MG: 100 TABLET ORAL at 09:01

## 2022-10-06 RX ADMIN — PHENYTOIN SODIUM 100 MG: 100 CAPSULE ORAL at 09:01

## 2022-10-06 RX ADMIN — LAMOTRIGINE 250 MG: 100 TABLET ORAL at 17:28

## 2022-10-06 RX ADMIN — LORAZEPAM 0.5 MG: 0.5 TABLET ORAL at 21:45

## 2022-10-06 RX ADMIN — QUETIAPINE FUMARATE 25 MG: 25 TABLET ORAL at 21:38

## 2022-10-06 NOTE — MALNUTRITION/BMI
This medical record reflects one or more clinical indicators suggestive of malnutrition  Malnutrition Findings:   Adult Malnutrition type: Chronic illness  Adult Degree of Malnutrition: Other severe protein calorie malnutrition  Malnutrition Characteristics: Inadequate energy, Weight loss                  360 Statement: in setting of Failure to Thrive, poor po intake over the last several months, <75% energy intake compared to estimated needs for > 1 mo, wt loss (*8/5/2022 167 5 lbs - *10/6/22 141 lbs  *16% wt loss x 2 mo), treated with diet and supplements as tolerated    BMI Findings: Body mass index is 29 44 kg/m²  See Nutrition note dated 10/5/22 for additional details  Completed nutrition assessment is viewable in the nutrition documentation

## 2022-10-06 NOTE — ASSESSMENT & PLAN NOTE
64-year-old female with past medical history of neurosarcoidosis, seizures, hypertension and AFib presents with failure to thrive  CT head negative for acute processes  Son reporting that over the past several months, patient has had worsening appetite, dysphagia and appears to be confused at times and now mainly bedbound  Patient requesting placement for short-term rehab  Discussed goals of care, family not yet ready for hospice, continues to be full code  PT, OT and speech therapy  Continue mirtazapine at bedtime and zyprexa prn  Pending placement for short-term rehab, eventual hospice if declining

## 2022-10-06 NOTE — CASE MANAGEMENT
Case Management Discharge Planning Note    Patient name Jahaira Howard  Location [address] [place] [address] /[address] [facility]-* MRN [id number]  : 1945 Date 10/6/2022       Current Admission Date: 10/4/2022  Current Admission Diagnosis:Failure to thrive in adult   Patient Active Problem List    Diagnosis Date Noted   • Failure to thrive in adult 10/04/2022   • Seizures ([facility] [address] ) 2021   • Stroke ([facility] [address] ) 2021   • Essential hypertension 2021   • Hypothyroidism 2021   • Hyperlipidemia 2021   • Atrial fibrillation ([facility] [address] ) 2021   • Chronic systolic heart failure ([facility] [address] ) 2021   • Neurosarcoidosis 2021      LOS (days): 2  Geometric Mean LOS (GMLOS) (days): 3 10  Days to GMLOS:1 1     OBJECTIVE:  Risk of Unplanned Readmission Score: 19 97         Current admission status: Inpatient   Preferred Pharmacy:   [address] [facility] [id number], [facility] ,, [place], [address] [place] [address]  Phone: [phone] Fax: [phone]    Primary Care Provider: Harpreet Arzola MD    Primary Insurance: [facility]  Secondary Insurance:     DISCHARGE DETAILS:          Comments - Freedom of Choice: Son is agreeable to [facility]   Additional Comments: MD stated son wanted hospice now  TC to son to discuss hospice and explained about hospice  Son would like to try IP Rehab first to see if pt can get some quality and hopfully come home  If she does not do well then he would look into hospice  Left a list of SNF that are willing to accept pt ([address] , [facility]  and [place]) in room  TC to son and he is agreeable to [facility]   Pt will need COVID test and insurnce auth

## 2022-10-06 NOTE — NURSING NOTE
[employer] message sent to [facility], on call for SLIM, to make provider aware that IV access was not able to be obtained  Return message stated that continuous IV fluids could be held for tonight, and patient could remain without IV access

## 2022-10-06 NOTE — PROGRESS NOTES
[facility]  Progress Note - Srinivas Regan 1945, 76 y o  female MRN: [id number]  Unit/Bed#: E4 -01 Encounter: [id number]  Primary Care Provider: Bianca David MD   Date and time admitted to hospital: 10/4/2022  9:48 AM    * Failure to thrive in adult  Assessment & Plan  64-year-old female with past medical history of neurosarcoidosis, seizures, hypertension and AFib presents with failure to thrive  CT head negative for acute processes  Son reporting that over the past several months, patient has had worsening appetite, dysphagia and appears to be confused at times and now mainly bedbound  Patient requesting placement for short-term rehab  Discussed goals of care, family not yet ready for hospice, continues to be full code  PT, OT and speech therapy  Continue mirtazapine at bedtime and zyprexa prn  Pending placement for short-term rehab, eventual hospice if declining    Neurosarcoidosis  Assessment & Plan  Continue home prednisone dose    Chronic systolic heart failure (HCC)  Assessment & Plan  Wt Readings from Last 3 Encounters:   10/06/22 64 6 kg (142 lb 6 7 oz)   08/31/22 64 8 kg (142 lb 13 7 oz)   08/30/22 69 8 kg (153 lb 14 1 oz)     Continue to hold diuretics, poor appetite, resume it patient's weight continues to gain    Atrial fibrillation (HCC)  Assessment & Plan  EKG showing Afib  Not on anticoagulation  Rate control not on AV shelly blockers    Essential hypertension  Assessment & Plan  Hold home BP meds, blood pressure currently in range  May discontinue bp meds on discharge    Seizures ([address] )  Assessment & Plan  No recent seizures  Continue lamictal and phenytoin        VTE Pharmacologic Prophylaxis:   Pharmacologic: Enoxaparin (Lovenox)  Mechanical VTE Prophylaxis in Place: Yes    Patient Centered Rounds: I have performed bedside rounds with nursing staff today      Discussions with Specialists or Other Care Team Provider: none    Education and Discussions with Family / Patient: family bedside    Time Spent for Care: 30 minutes  More than 50% of total time spent on counseling and coordination of care as described above  Current Length of Stay: 2 day(s)    Current Patient Status: Inpatient   Certification Statement: The patient will continue to require additional inpatient hospital stay due to pending placement    Discharge Plan: pending    Code Status: Level 3 - DNAR and DNI      Subjective:   No events overnight, slept well, less confused  Continues to decline medications but with family at bedside, was agreeable once convinced  No complaints at this time  Continues to have poor appetite  Objective:     Vitals:   Temp (24hrs), Av 2 °F (36 2 °C), Min:97 °F (36 1 °C), Max:97 5 °F (36 4 °C)    Temp:  [97 °F (36 1 °C)-97 5 °F (36 4 °C)] 97 °F (36 1 °C)  HR:  [] 70  Resp:  [20] 20  BP: (100-129)/(56-96) 127/67  SpO2:  [95 %-100 %] 97 %  Body mass index is 29 77 kg/m²  Input and Output Summary (last 24 hours): Intake/Output Summary (Last 24 hours) at 10/6/2022 1045  Last data filed at 10/6/2022 0359  Gross per 24 hour   Intake --   Output 500 ml   Net -500 ml       Physical Exam:     Physical Exam  Vitals and nursing note reviewed  Constitutional:       General: She is not in acute distress  Appearance: She is well-developed  She is ill-appearing  HENT:      Head: Normocephalic and atraumatic  Eyes:      General: No scleral icterus  Conjunctiva/sclera: Conjunctivae normal    Cardiovascular:      Rate and Rhythm: Normal rate  Rhythm irregular  Heart sounds: No murmur heard  Pulmonary:      Effort: Pulmonary effort is normal  No respiratory distress  Breath sounds: Normal breath sounds  No wheezing or rhonchi  Abdominal:      General: Bowel sounds are normal  There is no distension  Palpations: Abdomen is soft  Tenderness: There is no abdominal tenderness  Musculoskeletal:         General: No swelling        Cervical back: Neck supple  Right lower leg: No edema  Left lower leg: No edema  Skin:     General: Skin is warm and dry  Neurological:      Mental Status: She is alert  She is disoriented  Motor: Weakness present  Additional Data:     Labs:    Results from last 7 days   Lab Units 10/06/22  0519   WBC Thousand/uL 7 38   HEMOGLOBIN g/dL 12 5   HEMATOCRIT % 39 2   PLATELETS Thousands/uL 177   NEUTROS PCT % 62   LYMPHS PCT % 24   MONOS PCT % 10   EOS PCT % 3     Results from last 7 days   Lab Units 10/06/22  0519 10/05/22  0502 10/04/22  1047   SODIUM mmol/L 143   < > 140   POTASSIUM mmol/L 3 8   < > 4 0   CHLORIDE mmol/L 108   < > 102   CO2 mmol/L 27   < > 31   BUN mg/dL 8   < > 16   CREATININE mg/dL 1 00   < > 1 45*   ANION GAP mmol/L 8   < > 7   CALCIUM mg/dL 8 6   < > 8 9   ALBUMIN g/dL  --   --  3 4*   TOTAL BILIRUBIN mg/dL  --   --  0 83   ALK PHOS U/L  --   --  122*   ALT U/L  --   --  16   AST U/L  --   --  38   GLUCOSE RANDOM mg/dL 86   < > 75    < > = values in this interval not displayed  Results from last 7 days   Lab Units 10/06/22  0729 10/05/22  2108 10/05/22  1537 10/05/22  1129 10/05/22  0835 10/05/22  0738 10/04/22  2104   POC GLUCOSE mg/dl 73 100 139 109 68 59* 148*                   * I Have Reviewed All Lab Data Listed Above  * Additional Pertinent Lab Tests Reviewed: All Labs For Current Hospital Admission Reviewed    Imaging:    XR chest 1 view portable    Result Date: 10/4/2022  Impression: Lungs clear  Trace right effusion, similar to the abdomen CT from August 2022  Workstation performed: [id number]     CT head without contrast    Result Date: 10/4/2022  Impression: No evidence of acute intracranial process or significant interval change  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   Workstation performed: [id number]       Recent Cultures (last 7 days):           Last 24 Hours Medication List:   Current Facility-Administered Medications   Medication Dose Route Frequency Provider Last Rate   • acetaminophen  650 mg Oral Q6H PRN Alex Roman MD     • allopurinol  100 mg Oral Daily Alex Roman MD     • aspirin  81 mg Oral Every Other Day Alex Roman MD     • atorvastatin  10 mg Oral Daily With Brit Menezes MD     • enoxaparin  30 mg Subcutaneous Daily Alex Roman MD     • famotidine  20 mg Oral Early Morning Alex Roman MD     • lamoTRIgine  250 mg Oral BID Alex Roman MD     • levothyroxine  50 mcg Oral Early Morning Alex Roman MD     • LORazepam  0 5 mg Oral Q8H PRN Alex Roman MD     • OLANZapine  5 mg Intramuscular Q6H PRN Alex Roman MD     • ondansetron  4 mg Intravenous Q6H PRN Alex Roman MD     • phenytoin  100 mg Oral Daily Alex Roman MD     • predniSONE  7 5 mg Oral Daily Alex Roman MD     • QUEtiapine  25 mg Oral HS Alex Roman MD          Today, Patient Was Seen By: Alex Roman    ** Please Note: Dictation voice to text software may have been used in the creation of this document   **

## 2022-10-06 NOTE — NURSING NOTE
Reached out to Dr Gene Calderon to make him aware that patient hasn't voided all shift  Urinary retention protocol has been followed all shift, bladder scanning every 4 hours  See flowsheet for values  No concern at this time since patient has had poor oral intake

## 2022-10-06 NOTE — ASSESSMENT & PLAN NOTE
Wt Readings from Last 3 Encounters:   10/06/22 64 6 kg (142 lb 6 7 oz)   08/31/22 64 8 kg (142 lb 13 7 oz)   08/30/22 69 8 kg (153 lb 14 1 oz)     Continue to hold diuretics, poor appetite, resume it patient's weight continues to gain

## 2022-10-06 NOTE — PLAN OF CARE
Problem: Potential for Falls  Goal: Patient will remain free of falls  Description: INTERVENTIONS:  - Educate patient/family on patient safety including physical limitations  - Instruct patient to call for assistance with activity   - Consult OT/PT to assist with strengthening/mobility   - Keep Call bell within reach  - Keep bed low and locked with side rails adjusted as appropriate  - Keep care items and personal belongings within reach  - Initiate and maintain comfort rounds  - Make Fall Risk Sign visible to staff  - Offer Toileting every 2 Hours, in advance of need  - Initiate/Maintain bed alarm  - Obtain necessary fall risk management equipment: alarms  - Apply yellow socks and bracelet for high fall risk patients  - Consider moving patient to room near nurses station  Outcome: Progressing     Problem: Nutrition/Hydration-ADULT  Goal: Nutrient/Hydration intake appropriate for improving, restoring or maintaining nutritional needs  Description: Monitor and assess patient's nutrition/hydration status for malnutrition  Collaborate with interdisciplinary team and initiate plan and interventions as ordered  Monitor patient's weight and dietary intake as ordered or per policy  Utilize nutrition screening tool and intervene as necessary  Determine patient's food preferences and provide high-protein, high-caloric foods as appropriate       INTERVENTIONS:  - Monitor oral intake, urinary output, labs, and treatment plans  - Assess nutrition and hydration status and recommend course of action  - Evaluate amount of meals eaten  - Assist patient with eating if necessary   - Allow adequate time for meals  - Recommend/ encourage appropriate diets, oral nutritional supplements, and vitamin/mineral supplements  - Order, calculate, and assess calorie counts as needed  - Recommend, monitor, and adjust tube feedings and TPN/PPN based on assessed needs  - Assess need for intravenous fluids  - Provide specific nutrition/hydration education as appropriate  - Include patient/family/caregiver in decisions related to nutrition  Outcome: Progressing     Problem: Prexisting or High Potential for Compromised Skin Integrity  Goal: Skin integrity is maintained or improved  Description: INTERVENTIONS:  - Identify patients at risk for skin breakdown  - Assess and monitor skin integrity  - Assess and monitor nutrition and hydration status  - Monitor labs   - Assess for incontinence   - Turn and reposition patient  - Assist with mobility/ambulation  - Relieve pressure over bony prominences  - Avoid friction and shearing  - Provide appropriate hygiene as needed including keeping skin clean and dry  - Evaluate need for skin moisturizer/barrier cream  - Collaborate with interdisciplinary team   - Patient/family teaching  - Consider wound care consult   Outcome: Progressing     Problem: MOBILITY - ADULT  Goal: Maintain or return to baseline ADL function  Description: INTERVENTIONS:  -  Assess patient's ability to carry out ADLs; assess patient's baseline for ADL function and identify physical deficits which impact ability to perform ADLs (bathing, care of mouth/teeth, toileting, grooming, dressing, etc )  - Assess/evaluate cause of self-care deficits   - Assess range of motion  - Assess patient's mobility; develop plan if impaired  - Assess patient's need for assistive devices and provide as appropriate  - Encourage maximum independence but intervene and supervise when necessary  - Involve family in performance of ADLs  - Assess for home care needs following discharge   - Consider OT consult to assist with ADL evaluation and planning for discharge  - Provide patient education as appropriate  Outcome: Progressing  Goal: Maintains/Returns to pre admission functional level  Description: INTERVENTIONS:  - Perform BMAT or MOVE assessment daily    - Set and communicate daily mobility goal to care team and patient/family/caregiver     - Collaborate with rehabilitation services on mobility goals if consulted  - Perform Range of Motion 3 times a day  - Reposition patient every 2 hours    - Dangle patient 3 times a day  - Stand patient 3 times a day  - Ambulate patient 3 times a day  - Out of bed to chair 3 times a day   - Out of bed for meals 3 times a day  - Out of bed for toileting  - Record patient progress and toleration of activity level   Outcome: Progressing

## 2022-10-07 LAB — GLUCOSE SERPL-MCNC: 89 MG/DL (ref 65–140)

## 2022-10-07 PROCEDURE — 97110 THERAPEUTIC EXERCISES: CPT

## 2022-10-07 PROCEDURE — 82948 REAGENT STRIP/BLOOD GLUCOSE: CPT

## 2022-10-07 PROCEDURE — 97530 THERAPEUTIC ACTIVITIES: CPT

## 2022-10-07 PROCEDURE — 99232 SBSQ HOSP IP/OBS MODERATE 35: CPT | Performed by: STUDENT IN AN ORGANIZED HEALTH CARE EDUCATION/TRAINING PROGRAM

## 2022-10-07 RX ORDER — WATER 1000 ML/1000ML
INJECTION, SOLUTION INTRAVENOUS
Status: COMPLETED
Start: 2022-10-07 | End: 2022-10-07

## 2022-10-07 RX ADMIN — LAMOTRIGINE 250 MG: 100 TABLET ORAL at 08:37

## 2022-10-07 RX ADMIN — QUETIAPINE FUMARATE 25 MG: 25 TABLET ORAL at 22:29

## 2022-10-07 RX ADMIN — PREDNISONE 7.5 MG: 5 TABLET ORAL at 08:37

## 2022-10-07 RX ADMIN — WATER 1 ML: 1 INJECTION INTRAMUSCULAR; INTRAVENOUS; SUBCUTANEOUS at 06:19

## 2022-10-07 RX ADMIN — PHENYTOIN SODIUM 100 MG: 100 CAPSULE ORAL at 08:43

## 2022-10-07 RX ADMIN — OLANZAPINE 5 MG: 10 INJECTION, POWDER, LYOPHILIZED, FOR SOLUTION INTRAMUSCULAR at 06:14

## 2022-10-07 RX ADMIN — ALLOPURINOL 100 MG: 100 TABLET ORAL at 08:37

## 2022-10-07 RX ADMIN — ENOXAPARIN SODIUM 30 MG: 30 INJECTION, SOLUTION INTRAVENOUS; SUBCUTANEOUS at 08:41

## 2022-10-07 NOTE — PLAN OF CARE
Problem: Potential for Falls  Goal: Patient will remain free of falls  Description: INTERVENTIONS:  - Educate patient/family on patient safety including physical limitations  - Instruct patient to call for assistance with activity   - Consult OT/PT to assist with strengthening/mobility   - Keep Call bell within reach  - Keep bed low and locked with side rails adjusted as appropriate  - Keep care items and personal belongings within reach  - Initiate and maintain comfort rounds  - Make Fall Risk Sign visible to staff  - Offer Toileting every  Hours, in advance of need  - Initiate/Maintain alarm  - Obtain necessary fall risk management equipment  - Apply yellow socks and bracelet for high fall risk patients  - Consider moving patient to room near nurses station  Outcome: Progressing     Problem: Nutrition/Hydration-ADULT  Goal: Nutrient/Hydration intake appropriate for improving, restoring or maintaining nutritional needs  Description: Monitor and assess patient's nutrition/hydration status for malnutrition  Collaborate with interdisciplinary team and initiate plan and interventions as ordered  Monitor patient's weight and dietary intake as ordered or per policy  Utilize nutrition screening tool and intervene as necessary  Determine patient's food preferences and provide high-protein, high-caloric foods as appropriate       INTERVENTIONS:  - Monitor oral intake, urinary output, labs, and treatment plans  - Assess nutrition and hydration status and recommend course of action  - Evaluate amount of meals eaten  - Assist patient with eating if necessary   - Allow adequate time for meals  - Recommend/ encourage appropriate diets, oral nutritional supplements, and vitamin/mineral supplements  - Order, calculate, and assess calorie counts as needed  - Recommend, monitor, and adjust tube feedings and TPN/PPN based on assessed needs  - Assess need for intravenous fluids  - Provide specific nutrition/hydration education as appropriate  - Include patient/family/caregiver in decisions related to nutrition  Outcome: Progressing     Problem: Prexisting or High Potential for Compromised Skin Integrity  Goal: Skin integrity is maintained or improved  Description: INTERVENTIONS:  - Identify patients at risk for skin breakdown  - Assess and monitor skin integrity  - Assess and monitor nutrition and hydration status  - Monitor labs   - Assess for incontinence   - Turn and reposition patient  - Assist with mobility/ambulation  - Relieve pressure over bony prominences  - Avoid friction and shearing  - Provide appropriate hygiene as needed including keeping skin clean and dry  - Evaluate need for skin moisturizer/barrier cream  - Collaborate with interdisciplinary team   - Patient/family teaching  - Consider wound care consult   Outcome: Progressing     Problem: MOBILITY - ADULT  Goal: Maintain or return to baseline ADL function  Description: INTERVENTIONS:  -  Assess patient's ability to carry out ADLs; assess patient's baseline for ADL function and identify physical deficits which impact ability to perform ADLs (bathing, care of mouth/teeth, toileting, grooming, dressing, etc )  - Assess/evaluate cause of self-care deficits   - Assess range of motion  - Assess patient's mobility; develop plan if impaired  - Assess patient's need for assistive devices and provide as appropriate  - Encourage maximum independence but intervene and supervise when necessary  - Involve family in performance of ADLs  - Assess for home care needs following discharge   - Consider OT consult to assist with ADL evaluation and planning for discharge  - Provide patient education as appropriate  Outcome: Progressing  Goal: Maintains/Returns to pre admission functional level  Description: INTERVENTIONS:  - Perform BMAT or MOVE assessment daily    - Set and communicate daily mobility goal to care team and patient/family/caregiver     - Collaborate with rehabilitation services on mobility goals if consulted  - Perform Range of Motion  times a day  - Reposition patient every  hours    - Dangle patient  times a day  - Stand patient  times a day  - Ambulate patient  times a day  - Out of bed to chair  times a day   - Out of bed for meals  times a day  - Out of bed for toileting  - Record patient progress and toleration of activity level   Outcome: Progressing

## 2022-10-07 NOTE — PHYSICAL THERAPY NOTE
PHYSICAL THERAPY NOTE          Patient Name: Judah Fajardo  [id number] Date: 10/7/2022    10/07/22 1044   Note Type   Note Type Treatment   Pain Assessment   Pain Assessment Tool FLACC   Pain Rating: FLACC (Rest) - Face 0   Pain Rating: FLACC (Rest) - Legs 0   Pain Rating: FLACC (Rest) - Activity 0   Pain Rating: FLACC (Rest) - Cry 0   Pain Rating: FLACC (Rest) - Consolability 0   Score: FLACC (Rest) 0   Pain Rating: FLACC (Activity) - Face 1   Pain Rating: FLACC (Activity) - Legs 0   Pain Rating: FLACC (Activity) - Activity 1   Pain Rating: FLACC (Activity) - Cry 1   Pain Rating: FLACC (Activity) - Consolability 1   Score: FLACC (Activity) 4   Restrictions/Precautions   Other Precautions Cognitive; Chair Alarm; Bed Alarm; Fall Risk   General   Chart Reviewed Yes   Family/Caregiver Present No   Cognition   Overall Cognitive Status Impaired   Arousal/Participation Alert; Cooperative   Attention Attends with cues to redirect   Orientation Level Unable to assess   Memory Unable to assess   Following Commands Follows one step commands with increased time or repetition   Comments pt  with expressive aphasia, pt coopertive, not combative, mild agitation noted with rolling L and right and cleaning of pt   Subjective   Subjective Rn'ing cleared pt for PT  Bed Mobility   Rolling R 2  Maximal assistance   Additional items Assist x 1; Increased time required;Verbal cues;LE management   Rolling L 1  Dependent   Additional items Assist x 1; Increased time required;Verbal cues;LE management;Assist x 2   Supine to Sit 2  Maximal assistance   Additional items Assist x 1; Increased time required;Verbal cues;LE management   Sit to Supine 2  Maximal assistance   Additional items Assist x 1; Increased time required;Verbal cues;LE management   Additional Comments 3 sitting side scoots performed with max assist x2  while seated at EOB     Transfers   Sit to Stand 2 Maximal assistance   Additional items Assist x 2; Increased time required;Verbal cues  (r knee blocked)   Stand to Sit 3  Moderate assistance   Additional items Assist x 2; Increased time required;Verbal cues   Additional Comments pt peformed static standing with mod assist x2 with use of bed pad to pull pt 's hips forward and up with R knee blocked x 30 seconds   Ambulation/Elevation   Ambulation/Elevation Additional Comments pt  unable to take steps at this time  Balance   Static Sitting Fair +   Dynamic Sitting Fair   Static Standing Poor   Dynamic Standing Poor   Ambulatory Zero   Exercises   Heelslides Supine;10 reps;AAROM;AROM; Left  (prom  rl e x10 reps )   Hip Flexion Supine;10 reps;PROM; Right  (slr)   Hip Abduction Supine;AAROM; Left  (x 3 reps  pt resisting, PRom  R le x 5 reps)   Hip Adduction Supine;AAROM; Left  (x 3 reps  pt resistive, 5 reps PROM R le )   Knee AROM Short Arc Quad Supine;10 reps;AROM; Left   Ankle Pumps Supine;10 reps;AROM; Left  (prom r le )   Marching Sitting;10 reps;AROM; Left   Balance training  pt  performed static sitting balance/ tolerance activities at eob x 10- 12 minutes with min- close supervision x1 without ue support   Assessment   Prognosis Guarded   Problem List Decreased strength;Decreased endurance; Impaired balance;Decreased mobility; Decreased cognition;Decreased safety awareness; Impaired judgement   Assessment Pt seen for PT treatment session  Pt rec's suine in bed  Pt attempting to verbalize however unable to understand, pt appears to have expressive aphasia  Pt awke and alert t/o Pt session  Pt  Not combative, cooperative for most of session, noted some agitation with rolling in bed and with lashanda care and with hip abd  Add exercises, pt resistive with mid agitation  Pt  following commands with l le for hs, saq, ap, and seated hip flexion  No active movement noted r le performed prom to r le  Pt tolerated fair   Pt  Performs sitting balance and tolerance activities at EOB pt able to maintain static sitting balance with min- close supervision assist x 10 -12 minutes without ue support  Pt performed sit to stand transfers with max assist x2,  Wit R knee blocked x 30 seconds  And seated side scoots towrd head of bed with max assist x2 with use of bed pad  Pt  Performs bed mobility supine to sit , sit to supine with max assist x1, rolling to the R with max assist x1 and left with dependant assist x2  Pt  Demonstrated impairments in standing balance, functional mobility strength, cognition,  and safety  Pt will benefit from inpt Pt and rehab in order to address deficits and maximize functional outcomes  Goals   Patient Goals none stated due to expressive aphasia and impaired cognition   STG Expiration Date 10/19/22   PT Treatment Day 1   Plan   Treatment/Interventions Functional transfer training;LE strengthening/ROM; Therapeutic exercise; Endurance training;Patient/family training;Equipment eval/education; Bed mobility;Spoke to nursing   Progress Slow progress, cognitive deficits   PT Frequency 2-3x/wk   Recommendation   PT Discharge Recommendation Post acute rehabilitation services   AM-PAC Basic Mobility Inpatient   Turning in Bed Without Bedrails 1   Lying on Back to Sitting on Edge of Flat Bed 2   Moving Bed to Chair 1   Standing Up From Chair 2   Walk in Room 1   Climb 3-5 Stairs 1   Basic Mobility Inpatient Raw Score 8   Turning Head Towards Sound 3   Follow Simple Instructions 3   Low Function Basic Mobility Raw Score 14   Low Function Basic Mobility Standardized Score 22 01   Highest Level Of Mobility   JH-HLM Goal 3: Sit at edge of bed   JH-HLM Achieved 3: Sit at edge of bed   Education   Education Provided Mobility training;Home exercise program   Patient Reinforcement needed;Demonstrates acceptance/verbal understanding   End of Consult   Patient Position at End of Consult Supine;Bed/Chair alarm activated; All needs within reach   Jono Doty

## 2022-10-07 NOTE — CASE MANAGEMENT
Case Management Discharge Planning Note    Patient name Albert Barba  Location [id number] MRN [id number]  : 1945 Date 10/7/2022       Current Admission Date: 10/4/2022  Current Admission Diagnosis:Failure to thrive in adult   Patient Active Problem List    Diagnosis Date Noted   • Failure to thrive in adult 10/04/2022   • Seizures ([facility] [address] ) 2021   • Stroke ([facility] ) 2021   • Essential hypertension 2021   • Hypothyroidism 2021   • Hyperlipidemia 2021   • Atrial fibrillation ([facility] ) 2021   • Chronic systolic heart failure ([facility] ) 2021   • Neurosarcoidosis 2021      LOS (days): 3  Geometric Mean LOS (GMLOS) (days): 3 10  Days to GMLOS:0 1     OBJECTIVE:  Risk of Unplanned Readmission Score: 18 81         Current admission status: Inpatient   Preferred Pharmacy:   [facility]  # [address]  Phone: [phone] Fax: [phone]    Primary Care Provider: Jessica Mahoney MD    Primary Insurance: [facility]  Secondary Insurance:     DISCHARGE DETAILS:       Additional Comments: Amy Peralta  building willing to accept, pt needs second COVID shot on 10/25/2022 and insurance auth

## 2022-10-07 NOTE — PROGRESS NOTES
[facility]  Progress Note - Adan Bullock 1945, 76 y o  female MRN: [id number]  Unit/Bed#: E4 -01 Encounter: [id number]  Primary Care Provider: Marjan Acuna MD   Date and time admitted to hospital: 10/4/2022  9:48 AM    * Failure to thrive in adult  Assessment & Plan  40-year-old female with past medical history of neurosarcoidosis, seizures, hypertension and AFib presents with failure to thrive  CT head negative for acute processes  Son reporting that over the past several months, patient has had worsening appetite, dysphagia and appears to be confused at times and now mainly bedbound  Patient requesting placement for short-term rehab  Discussed goals of care, family not yet ready for hospice, continues to be full code  PT, OT and speech therapy  Continue mirtazapine at bedtime and zyprexa prn  Pending placement for short-term rehab, may need hospice if patient continues to decline, family agreement with plan    Neurosarcoidosis  Assessment & Plan  Continue home prednisone dose    Chronic systolic heart failure ([facility] [address] )  Assessment & Plan  Wt Readings from Last 3 Encounters:   10/07/22 64 5 kg (142 lb 3 2 oz)   08/31/22 64 8 kg (142 lb 13 7 oz)   08/30/22 69 8 kg (153 lb 14 1 oz)     Continue to hold diuretics, poor appetite, may need to discontinue on discharge    Atrial fibrillation (HCC)  Assessment & Plan  EKG showing Afib  Not on anticoagulation  Rate control not on AV shelly blockers    Essential hypertension  Assessment & Plan  Hold home BP meds, blood pressure currently in range  May discontinue bp meds on discharge    Seizures ([facility] [address] )  Assessment & Plan  No recent seizures  Continue lamictal and phenytoin      VTE Pharmacologic Prophylaxis:   Pharmacologic: none  Mechanical VTE Prophylaxis in Place: Yes    Patient Centered Rounds: I have performed bedside rounds with nursing staff today      Discussions with Specialists or Other Care Team Provider: CM    Education and Discussions with Family / Patient: son bedside    Time Spent for Care: 30 minutes  More than 50% of total time spent on counseling and coordination of care as described above  Current Length of Stay: 3 day(s)    Current Patient Status: Inpatient   Certification Statement: The patient will continue to require additional inpatient hospital stay due to pending placement    Discharge Plan: pending placement    Code Status: Level 3 - DNAR and DNI      Subjective:   Patient was agitated overnight required Zyprexa, eventually calmed down and was able get sleep  Continues have poor appetite, poor fluid intake  Objective:     Vitals:   Temp (24hrs), Av 2 °F (36 2 °C), Min:96 9 °F (36 1 °C), Max:97 4 °F (36 3 °C)    Temp:  [96 9 °F (36 1 °C)-97 4 °F (36 3 °C)] 96 9 °F (36 1 °C)  HR:  [65-96] 96  Resp:  [16-28] 28  BP: (117-124)/(58-78) 124/78  SpO2:  [98 %-100 %] 98 %  Body mass index is 29 72 kg/m²  Input and Output Summary (last 24 hours):     No intake or output data in the 24 hours ending 10/07/22 0937    Physical Exam:     Physical Exam  Vitals and nursing note reviewed  Constitutional:       General: She is not in acute distress  Appearance: She is well-developed  She is ill-appearing  HENT:      Head: Normocephalic and atraumatic  Eyes:      General: No scleral icterus  Conjunctiva/sclera: Conjunctivae normal    Cardiovascular:      Rate and Rhythm: Normal rate  Rhythm irregular  Heart sounds: No murmur heard  Pulmonary:      Effort: Pulmonary effort is normal  No respiratory distress  Breath sounds: Normal breath sounds  No wheezing or rhonchi  Abdominal:      General: Bowel sounds are normal  There is no distension  Palpations: Abdomen is soft  Tenderness: There is no abdominal tenderness  Musculoskeletal:         General: No swelling  Cervical back: Neck supple  Right lower leg: No edema  Left lower leg: No edema     Skin:     General: Skin is warm and dry  Neurological:      Mental Status: She is alert  She is disoriented  Motor: Weakness present  Additional Data:     Labs:    Results from last 7 days   Lab Units 10/06/22  0519   WBC Thousand/uL 7 38   HEMOGLOBIN g/dL 12 5   HEMATOCRIT % 39 2   PLATELETS Thousands/uL 177   NEUTROS PCT % 62   LYMPHS PCT % 24   MONOS PCT % 10   EOS PCT % 3     Results from last 7 days   Lab Units 10/06/22  0519 10/05/22  0502 10/04/22  1047   SODIUM mmol/L 143   < > 140   POTASSIUM mmol/L 3 8   < > 4 0   CHLORIDE mmol/L 108   < > 102   CO2 mmol/L 27   < > 31   BUN mg/dL 8   < > 16   CREATININE mg/dL 1 00   < > 1 45*   ANION GAP mmol/L 8   < > 7   CALCIUM mg/dL 8 6   < > 8 9   ALBUMIN g/dL  --   --  3 4*   TOTAL BILIRUBIN mg/dL  --   --  0 83   ALK PHOS U/L  --   --  122*   ALT U/L  --   --  16   AST U/L  --   --  38   GLUCOSE RANDOM mg/dL 86   < > 75    < > = values in this interval not displayed  Results from last 7 days   Lab Units 10/07/22  0727 10/06/22  1614 10/06/22  1126 10/06/22  0729 10/05/22  2108 10/05/22  1537 10/05/22  1129 10/05/22  0835 10/05/22  0738 10/04/22  2104   POC GLUCOSE mg/dl 89 117 105 73 100 139 109 68 59* 148*                   * I Have Reviewed All Lab Data Listed Above  * Additional Pertinent Lab Tests Reviewed: All Labs For Current Hospital Admission Reviewed    Imaging:    XR chest 1 view portable    Result Date: 10/4/2022  Impression: Lungs clear  Trace right effusion, similar to the abdomen CT from August 2022  Workstation performed: [id number]     CT head without contrast    Result Date: 10/4/2022  Impression: No evidence of acute intracranial process or significant interval change  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   Workstation performed: [id number]       Recent Cultures (last 7 days):           Last 24 Hours Medication List:   Current Facility-Administered Medications   Medication Dose Route Frequency Provider Last Rate   • acetaminophen  650 mg Oral Q6H PRN Chayito Farris MD     • allopurinol  100 mg Oral Daily Chayito Farris MD     • aspirin  81 mg Oral Every Other Day Chayito Farris MD     • atorvastatin  10 mg Oral Daily With Dakota Mauro MD     • enoxaparin  30 mg Subcutaneous Daily Chayito Farris MD     • famotidine  20 mg Oral Early Morning Chayito Farris MD     • lamoTRIgine  250 mg Oral BID Chayito Farris MD     • levothyroxine  50 mcg Oral Early Morning Chayito Farris MD     • LORazepam  0 5 mg Oral Q8H PRN Chayito Farris MD     • OLANZapine  5 mg Intramuscular Q6H PRN Chayito Farris MD     • ondansetron  4 mg Intravenous Q6H PRN Chayito Farris MD     • phenytoin  100 mg Oral Daily Chayito Farris MD     • predniSONE  7 5 mg Oral Daily Chayito Farris MD     • QUEtiapine  25 mg Oral HS Chayito Farris MD          Today, Patient Was Seen By: Chayito Farris    ** Please Note: Dictation voice to text software may have been used in the creation of this document   **

## 2022-10-07 NOTE — NURSING NOTE
Pt became agitated when we attempted to change her linens  Pt kicked  room divider knocking it into lower rail of adjacent bed disturbing the roommate  Zyprexa was given IM

## 2022-10-07 NOTE — ASSESSMENT & PLAN NOTE
Wt Readings from Last 3 Encounters:   10/07/22 64 5 kg (142 lb 3 2 oz)   08/31/22 64 8 kg (142 lb 13 7 oz)   08/30/22 69 8 kg (153 lb 14 1 oz)     Continue to hold diuretics, poor appetite, may need to discontinue on discharge

## 2022-10-07 NOTE — PLAN OF CARE
Problem: PHYSICAL THERAPY ADULT  Goal: Performs mobility at highest level of function for planned discharge setting  See evaluation for individualized goals  Description: Treatment/Interventions: Functional transfer training, LE strengthening/ROM, Therapeutic exercise, Endurance training, Patient/family training, Bed mobility, Gait training, Spoke to nursing, OT, Spoke to case management  Equipment Recommended: Other (Comment) (monitor)       See flowsheet documentation for full assessment, interventions and recommendations  Outcome: Progressing  Note: Prognosis: Guarded  Problem List: Decreased strength, Decreased endurance, Impaired balance, Decreased mobility, Decreased cognition, Decreased safety awareness, Impaired judgement  Assessment: Pt seen for PT treatment session  Pt rec's suine in bed  Pt attempting to verbalize however unable to understand, pt appears to have expressive aphasia  Pt awke and alert t/o Pt session  Pt  Not combative, cooperative for most of session, noted some agitation with rolling in bed and with lashanda care and with hip abd  Add exercises, pt resistive with mid agitation  Pt  following commands with l le for hs, saq, ap, and seated hip flexion  No active movement noted r le performed prom to r le  Pt tolerated fair  Pt  Performs sitting balance and tolerance activities at EOB pt able to maintain static sitting balance with min- close supervision assist x 10 -12 minutes without ue support  Pt performed sit to stand transfers with max assist x2,  Wit R knee blocked x 30 seconds  And seated side scoots towrd head of bed with max assist x2 with use of bed pad  Pt  Performs bed mobility supine to sit , sit to supine with max assist x1, rolling to the R with max assist x1 and left with dependant assist x2  Pt  Demonstrated impairments in standing balance, functional mobility strength, cognition,  and safety   Pt will benefit from inpt Pt and rehab in order to address deficits and maximize functional outcomes  PT Discharge Recommendation: Post acute rehabilitation services    See flowsheet documentation for full assessment

## 2022-10-07 NOTE — ASSESSMENT & PLAN NOTE
73-year-old female with past medical history of neurosarcoidosis, seizures, hypertension and AFib presents with failure to thrive  CT head negative for acute processes  Son reporting that over the past several months, patient has had worsening appetite, dysphagia and appears to be confused at times and now mainly bedbound  Patient requesting placement for short-term rehab  Discussed goals of care, family not yet ready for hospice, continues to be full code  PT, OT and speech therapy  Continue mirtazapine at bedtime and zyprexa prn  Pending placement for short-term rehab, may need hospice if patient continues to decline, family agreement with plan

## 2022-10-08 PROCEDURE — 99232 SBSQ HOSP IP/OBS MODERATE 35: CPT | Performed by: STUDENT IN AN ORGANIZED HEALTH CARE EDUCATION/TRAINING PROGRAM

## 2022-10-08 RX ADMIN — FAMOTIDINE 20 MG: 20 TABLET ORAL at 05:21

## 2022-10-08 RX ADMIN — QUETIAPINE FUMARATE 25 MG: 25 TABLET ORAL at 21:17

## 2022-10-08 RX ADMIN — LEVOTHYROXINE SODIUM 50 MCG: 50 TABLET ORAL at 05:21

## 2022-10-08 RX ADMIN — LORAZEPAM 0.5 MG: 0.5 TABLET ORAL at 05:25

## 2022-10-08 RX ADMIN — ACETAMINOPHEN 650 MG: 325 TABLET, FILM COATED ORAL at 05:25

## 2022-10-08 NOTE — ASSESSMENT & PLAN NOTE
Wt Readings from Last 3 Encounters:   10/08/22 63 9 kg (140 lb 14 oz)   08/31/22 64 8 kg (142 lb 13 7 oz)   08/30/22 69 8 kg (153 lb 14 1 oz)     Continue to hold diuretics, poor appetite, may need to discontinue on discharge

## 2022-10-08 NOTE — PLAN OF CARE
Problem: Potential for Falls  Goal: Patient will remain free of falls  Description: INTERVENTIONS:  - Educate patient/family on patient safety including physical limitations  - Instruct patient to call for assistance with activity   - Consult OT/PT to assist with strengthening/mobility   - Keep Call bell within reach  - Keep bed low and locked with side rails adjusted as appropriate  - Keep care items and personal belongings within reach  - Initiate and maintain comfort rounds  - Make Fall Risk Sign visible to staff  - Offer Toileting every 2 Hours, in advance of need  - Initiate/Maintain bed alarm  - Obtain necessary fall risk management equipment: yellow socks  - Apply yellow socks and bracelet for high fall risk patients  - Consider moving patient to room near nurses station  Outcome: Progressing     Problem: Prexisting or High Potential for Compromised Skin Integrity  Goal: Skin integrity is maintained or improved  Description: INTERVENTIONS:  - Identify patients at risk for skin breakdown  - Assess and monitor skin integrity  - Assess and monitor nutrition and hydration status  - Monitor labs   - Assess for incontinence   - Turn and reposition patient  - Assist with mobility/ambulation  - Relieve pressure over bony prominences  - Avoid friction and shearing  - Provide appropriate hygiene as needed including keeping skin clean and dry  - Evaluate need for skin moisturizer/barrier cream  - Collaborate with interdisciplinary team   - Patient/family teaching  - Consider wound care consult   Outcome: Progressing

## 2022-10-08 NOTE — PROGRESS NOTES
[address] [facility]  Progress Note - Jennifer Sienna 1945, 76 y o  female MRN: [id number]  Unit/Bed#: E4 -01 Encounter: [id number]  Primary Care Provider: Juan Jose Farnsworth MD   Date and time admitted to hospital: 10/4/2022  9:48 AM    * Failure to thrive in adult  Assessment & Plan  38-year-old female with past medical history of neurosarcoidosis, seizures, hypertension and AFib presents with failure to thrive  CT head negative for acute processes  Son reporting that over the past several months, patient has had worsening appetite, dysphagia and appears to be confused at times and now mainly bedbound  Patient requesting placement for short-term rehab  Discussed goals of care, family not yet ready for hospice, continues to be full code  PT, OT and speech therapy  Continue mirtazapine at bedtime and zyprexa prn  Pending placement for short-term rehab, may need hospice if patient continues to decline, family agreement with plan  Difficult placement, per case management, require 2nd shot of COVID vaccine, scheduled 10/25    Neurosarcoidosis  Assessment & Plan  Continue home prednisone dose    Chronic systolic heart failure ([facility] [address] )  Assessment & Plan  Wt Readings from Last 3 Encounters:   10/08/22 63 9 kg (140 lb 14 oz)   08/31/22 64 8 kg (142 lb 13 7 oz)   08/30/22 69 8 kg (153 lb 14 1 oz)     Continue to hold diuretics, poor appetite, may need to discontinue on discharge    Atrial fibrillation (HCC)  Assessment & Plan  EKG showing Afib  Not on anticoagulation  Rate control not on AV shelly blockers    Essential hypertension  Assessment & Plan  Hold home BP meds, blood pressure currently in range  May discontinue bp meds on discharge    Seizures ([facility] [address] )  Assessment & Plan  No recent seizures  Continue lamictal and phenytoin    VTE Pharmacologic Prophylaxis:   Pharmacologic: none  Mechanical VTE Prophylaxis in Place: Yes    Patient Centered Rounds: I have performed bedside rounds with nursing staff today     Discussions with Specialists or Other Care Team Provider: CM    Education and Discussions with Family / Patient: none    Time Spent for Care: 30 minutes  More than 50% of total time spent on counseling and coordination of care as described above  Current Length of Stay: 4 day(s)    Current Patient Status: Inpatient   Certification Statement: The patient will continue to require additional inpatient hospital stay due to pending placement    Discharge Plan: pending    Code Status: Level 3 - DNAR and DNI      Subjective:   No events overnight, poor appetite  No complaints  Objective:     Vitals:   Temp (24hrs), Av 2 °F (36 2 °C), Min:97 1 °F (36 2 °C), Max:97 3 °F (36 3 °C)    Temp:  [97 1 °F (36 2 °C)-97 3 °F (36 3 °C)] 97 3 °F (36 3 °C)  HR:  [56-61] 61  Resp:  [18-20] 18  BP: (112-133)/(60-65) 133/60  SpO2:  [94 %-99 %] 94 %  Body mass index is 29 44 kg/m²  Input and Output Summary (last 24 hours): Intake/Output Summary (Last 24 hours) at 10/8/2022 1108  Last data filed at 10/8/2022 0901  Gross per 24 hour   Intake 50 ml   Output --   Net 50 ml       Physical Exam:     Physical Exam  Vitals and nursing note reviewed  Constitutional:       General: She is not in acute distress  Appearance: She is well-developed  She is ill-appearing  HENT:      Head: Normocephalic and atraumatic  Eyes:      General: No scleral icterus  Conjunctiva/sclera: Conjunctivae normal    Cardiovascular:      Rate and Rhythm: Normal rate  Rhythm irregular  Heart sounds: No murmur heard  Pulmonary:      Effort: Pulmonary effort is normal  No respiratory distress  Breath sounds: Normal breath sounds  No wheezing or rhonchi  Abdominal:      General: Bowel sounds are normal  There is no distension  Palpations: Abdomen is soft  Tenderness: There is no abdominal tenderness  Musculoskeletal:         General: No swelling  Cervical back: Neck supple        Right lower leg: No edema  Left lower leg: No edema  Skin:     General: Skin is warm and dry  Neurological:      Mental Status: She is alert  She is disoriented  Motor: Weakness present  Additional Data:     Labs:    Results from last 7 days   Lab Units 10/06/22  0519   WBC Thousand/uL 7 38   HEMOGLOBIN g/dL 12 5   HEMATOCRIT % 39 2   PLATELETS Thousands/uL 177   NEUTROS PCT % 62   LYMPHS PCT % 24   MONOS PCT % 10   EOS PCT % 3     Results from last 7 days   Lab Units 10/06/22  0519 10/05/22  0502 10/04/22  1047   SODIUM mmol/L 143   < > 140   POTASSIUM mmol/L 3 8   < > 4 0   CHLORIDE mmol/L 108   < > 102   CO2 mmol/L 27   < > 31   BUN mg/dL 8   < > 16   CREATININE mg/dL 1 00   < > 1 45*   ANION GAP mmol/L 8   < > 7   CALCIUM mg/dL 8 6   < > 8 9   ALBUMIN g/dL  --   --  3 4*   TOTAL BILIRUBIN mg/dL  --   --  0 83   ALK PHOS U/L  --   --  122*   ALT U/L  --   --  16   AST U/L  --   --  38   GLUCOSE RANDOM mg/dL 86   < > 75    < > = values in this interval not displayed  Results from last 7 days   Lab Units 10/07/22  0727 10/06/22  1614 10/06/22  1126 10/06/22  0729 10/05/22  2108 10/05/22  1537 10/05/22  1129 10/05/22  0835 10/05/22  0738 10/04/22  2104   POC GLUCOSE mg/dl 89 117 105 73 100 139 109 68 59* 148*                   * I Have Reviewed All Lab Data Listed Above  * Additional Pertinent Lab Tests Reviewed: All Labs For Current Hospital Admission Reviewed    Imaging:    XR chest 1 view portable    Result Date: 10/4/2022  Impression: Lungs clear  Trace right effusion, similar to the abdomen CT from August 2022  Workstation performed: [id number]     CT head without contrast    Result Date: 10/4/2022  Impression: No evidence of acute intracranial process or significant interval change  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   Workstation performed: [id number]       Recent Cultures (last 7 days):           Last 24 Hours Medication List:   Current Facility-Administered Medications Medication Dose Route Frequency Provider Last Rate   • acetaminophen  650 mg Oral Q6H PRN Joleen Saha MD     • allopurinol  100 mg Oral Daily Joleen Saha MD     • aspirin  81 mg Oral Every Other Day Joleen Saha MD     • atorvastatin  10 mg Oral Daily With Heriberto Rankin MD     • famotidine  20 mg Oral Early Morning Joleen Saha MD     • lamoTRIgine  250 mg Oral BID Joleen Saha MD     • levothyroxine  50 mcg Oral Early Morning Joleen Saha MD     • LORazepam  0 5 mg Oral Q8H PRN Joleen Saha MD     • OLANZapine  5 mg Intramuscular Q6H PRN Joleen Saha MD     • ondansetron  4 mg Intravenous Q6H PRN Joleen Saha MD     • phenytoin  100 mg Oral Daily Joleen Saha MD     • predniSONE  7 5 mg Oral Daily Joleen Saha MD     • QUEtiapine  25 mg Oral HS Joleen Saha MD          Today, Patient Was Seen By: Joleen Saha    ** Please Note: Dictation voice to text software may have been used in the creation of this document   **

## 2022-10-08 NOTE — ASSESSMENT & PLAN NOTE
66-year-old female with past medical history of neurosarcoidosis, seizures, hypertension and AFib presents with failure to thrive  CT head negative for acute processes  Son reporting that over the past several months, patient has had worsening appetite, dysphagia and appears to be confused at times and now mainly bedbound  Patient requesting placement for short-term rehab  Discussed goals of care, family not yet ready for hospice, continues to be full code  PT, OT and speech therapy  Continue mirtazapine at bedtime and zyprexa prn  Pending placement for short-term rehab, may need hospice if patient continues to decline, family agreement with plan  Difficult placement, per case management, require 2nd shot of COVID vaccine, scheduled 10/25

## 2022-10-09 PROBLEM — E43 SEVERE PROTEIN-CALORIE MALNUTRITION (HCC): Status: ACTIVE | Noted: 2022-10-09

## 2022-10-09 PROCEDURE — 99232 SBSQ HOSP IP/OBS MODERATE 35: CPT | Performed by: STUDENT IN AN ORGANIZED HEALTH CARE EDUCATION/TRAINING PROGRAM

## 2022-10-09 RX ADMIN — SODIUM CHLORIDE 250 ML: 0.9 INJECTION, SOLUTION INTRAVENOUS at 04:37

## 2022-10-09 RX ADMIN — PREDNISONE 7.5 MG: 5 TABLET ORAL at 12:30

## 2022-10-09 RX ADMIN — PHENYTOIN SODIUM 100 MG: 100 CAPSULE ORAL at 09:23

## 2022-10-09 RX ADMIN — LAMOTRIGINE 250 MG: 100 TABLET ORAL at 12:30

## 2022-10-09 RX ADMIN — QUETIAPINE FUMARATE 25 MG: 25 TABLET ORAL at 21:36

## 2022-10-09 NOTE — ASSESSMENT & PLAN NOTE
Malnutrition Findings:   Adult Malnutrition type: Chronic illness  Adult Degree of Malnutrition: Other severe protein calorie malnutrition  Malnutrition Characteristics: Inadequate energy, Weight loss                  360 Statement: in setting of Failure to Thrive, poor po intake over the last several months, <75% energy intake compared to estimated needs for > 1 mo, wt loss (*8/5/2022 167 5 lbs - *10/6/22 141 lbs  *16% wt loss x 2 mo), treated with diet and supplements as tolerated    BMI Findings: Body mass index is 29 37 kg/m²

## 2022-10-09 NOTE — ASSESSMENT & PLAN NOTE
Wt Readings from Last 3 Encounters:   10/09/22 63 7 kg (140 lb 8 3 oz)   08/31/22 64 8 kg (142 lb 13 7 oz)   08/30/22 69 8 kg (153 lb 14 1 oz)     Continue to hold diuretics, poor appetite, may need to discontinue on discharge

## 2022-10-09 NOTE — ASSESSMENT & PLAN NOTE
10-year-old female with past medical history of neurosarcoidosis, seizures, hypertension and AFib presents with failure to thrive  CT head negative for acute processes  Son reporting that over the past several months, patient has had worsening appetite, dysphagia and appears to be confused at times and now mainly bedbound  Continue mirtazapine at bedtime and zyprexa prn  Pending placement for short-term rehab, may need hospice if patient continues to decline, family agreement with plan  Difficult placement, per case management, require 2nd shot of COVID vaccine, scheduled 10/25

## 2022-10-09 NOTE — PROGRESS NOTES
[address] [facility]  Progress Note - María Precise 1945, 76 y o  female MRN: [id number]  Unit/Bed#: E4 -01 Encounter: [id number]  Primary Care Provider: Mejia Pope MD   Date and time admitted to hospital: 10/4/2022  9:48 AM    * Failure to thrive in adult  Assessment & Plan  52-year-old female with past medical history of neurosarcoidosis, seizures, hypertension and AFib presents with failure to thrive  CT head negative for acute processes  Son reporting that over the past several months, patient has had worsening appetite, dysphagia and appears to be confused at times and now mainly bedbound  Continue mirtazapine at bedtime and zyprexa prn  Pending placement for short-term rehab, may need hospice if patient continues to decline, family agreement with plan  Difficult placement, per case management, require 2nd shot of COVID vaccine, scheduled 10/25    Neurosarcoidosis  Assessment & Plan  Continue home prednisone dose    Chronic systolic heart failure ([facility] [address] )  Assessment & Plan  Wt Readings from Last 3 Encounters:   10/09/22 63 7 kg (140 lb 8 3 oz)   08/31/22 64 8 kg (142 lb 13 7 oz)   08/30/22 69 8 kg (153 lb 14 1 oz)     Continue to hold diuretics, poor appetite, may need to discontinue on discharge    Atrial fibrillation ([facility] [address] )  Assessment & Plan  EKG showing Afib  Not on anticoagulation  Rate control not on AV shelly blockers    Essential hypertension  Assessment & Plan  Hold home BP meds, blood pressure soft  Required bolus overnight, due to poor po intake    Seizures (HCC)  Assessment & Plan  No recent seizures  Continue lamictal and phenytoin      VTE Pharmacologic Prophylaxis:   Pharmacologic: none  Mechanical VTE Prophylaxis in Place: Yes    Patient Centered Rounds: I have performed bedside rounds with nursing staff today      Discussions with Specialists or Other Care Team Provider: CM    Education and Discussions with Family / Patient: son on phone    Time Spent for Care: 30 minutes  More than 50% of total time spent on counseling and coordination of care as described above  Current Length of Stay: 5 day(s)    Current Patient Status: Inpatient   Certification Statement: The patient will continue to require additional inpatient hospital stay due to Pending placement for STR, eventual hospice    Discharge Plan: pending    Code Status: Level 3 - DNAR and DNI      Subjective:   Overnight, patient noted to be retaining urine requiring straight cath  Poor po intake, not taking medications  Call and updated son  Objective:     Vitals:   Temp (24hrs), Av 3 °F (36 3 °C), Min:97 1 °F (36 2 °C), Max:97 4 °F (36 3 °C)    Temp:  [97 1 °F (36 2 °C)-97 4 °F (36 3 °C)] 97 4 °F (36 3 °C)  HR:  [64-91] 67  Resp:  [16-18] 18  BP: ()/(44-72) 110/64  SpO2:  [92 %-100 %] 100 %  Body mass index is 29 37 kg/m²  Input and Output Summary (last 24 hours): Intake/Output Summary (Last 24 hours) at 10/9/2022 1041  Last data filed at 10/9/2022 1001  Gross per 24 hour   Intake 60 ml   Output 667 ml   Net -607 ml       Physical Exam:     Physical Exam  Vitals and nursing note reviewed  Constitutional:       General: She is not in acute distress  Appearance: She is well-developed  She is ill-appearing  HENT:      Head: Normocephalic and atraumatic  Eyes:      General: No scleral icterus  Conjunctiva/sclera: Conjunctivae normal    Cardiovascular:      Rate and Rhythm: Normal rate  Rhythm irregular  Heart sounds: No murmur heard  Pulmonary:      Effort: Pulmonary effort is normal  No respiratory distress  Breath sounds: Normal breath sounds  Abdominal:      General: Bowel sounds are normal  There is no distension  Palpations: Abdomen is soft  Musculoskeletal:         General: No swelling  Right lower leg: No edema  Left lower leg: No edema  Skin:     General: Skin is warm and dry  Neurological:      Mental Status: She is alert  She is disoriented  Motor: Weakness present  Psychiatric:         Mood and Affect: Mood normal          Additional Data:     Labs:    Results from last 7 days   Lab Units 10/06/22  0519   WBC Thousand/uL 7 38   HEMOGLOBIN g/dL 12 5   HEMATOCRIT % 39 2   PLATELETS Thousands/uL 177   NEUTROS PCT % 62   LYMPHS PCT % 24   MONOS PCT % 10   EOS PCT % 3     Results from last 7 days   Lab Units 10/06/22  0519 10/05/22  0502 10/04/22  1047   SODIUM mmol/L 143   < > 140   POTASSIUM mmol/L 3 8   < > 4 0   CHLORIDE mmol/L 108   < > 102   CO2 mmol/L 27   < > 31   BUN mg/dL 8   < > 16   CREATININE mg/dL 1 00   < > 1 45*   ANION GAP mmol/L 8   < > 7   CALCIUM mg/dL 8 6   < > 8 9   ALBUMIN g/dL  --   --  3 4*   TOTAL BILIRUBIN mg/dL  --   --  0 83   ALK PHOS U/L  --   --  122*   ALT U/L  --   --  16   AST U/L  --   --  38   GLUCOSE RANDOM mg/dL 86   < > 75    < > = values in this interval not displayed  Results from last 7 days   Lab Units 10/07/22  0727 10/06/22  1614 10/06/22  1126 10/06/22  0729 10/05/22  2108 10/05/22  1537 10/05/22  1129 10/05/22  0835 10/05/22  0738 10/04/22  2104   POC GLUCOSE mg/dl 89 117 105 73 100 139 109 68 59* 148*                   * I Have Reviewed All Lab Data Listed Above  * Additional Pertinent Lab Tests Reviewed: All Labs For Current Hospital Admission Reviewed    Imaging:    XR chest 1 view portable    Result Date: 10/4/2022  Impression: Lungs clear  Trace right effusion, similar to the abdomen CT from August 2022  Workstation performed: [id number]     CT head without contrast    Result Date: 10/4/2022  Impression: No evidence of acute intracranial process or significant interval change  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   Workstation performed: [id number]       Recent Cultures (last 7 days):           Last 24 Hours Medication List:   Current Facility-Administered Medications   Medication Dose Route Frequency Provider Last Rate   • acetaminophen  650 mg Oral Q6H PRN Marvel Vora MD     • allopurinol  100 mg Oral Daily Fina Youssef MD     • aspirin  81 mg Oral Every Other Day Fina Youssef MD     • atorvastatin  10 mg Oral Daily With Ericka Quintero MD     • famotidine  20 mg Oral Early Morning Fina Youssef MD     • lamoTRIgine  250 mg Oral BID Fina Youssef MD     • levothyroxine  50 mcg Oral Early Morning Fina Youssef MD     • LORazepam  0 5 mg Oral Q8H PRN Fina Youssef MD     • OLANZapine  5 mg Intramuscular Q6H PRN Fina Youssef MD     • ondansetron  4 mg Intravenous Q6H PRN Fina Youssef MD     • phenytoin  100 mg Oral Daily Fina Youssef MD     • predniSONE  7 5 mg Oral Daily Fina Youssef MD     • QUEtiapine  25 mg Oral HS Fina Youssef MD          Today, Patient Was Seen By: Fina Youssef    ** Please Note: Dictation voice to text software may have been used in the creation of this document   **

## 2022-10-10 PROCEDURE — 97116 GAIT TRAINING THERAPY: CPT

## 2022-10-10 PROCEDURE — 97530 THERAPEUTIC ACTIVITIES: CPT

## 2022-10-10 PROCEDURE — 99232 SBSQ HOSP IP/OBS MODERATE 35: CPT | Performed by: INTERNAL MEDICINE

## 2022-10-10 RX ORDER — DEXTROSE AND SODIUM CHLORIDE 5; .45 G/100ML; G/100ML
50 INJECTION, SOLUTION INTRAVENOUS CONTINUOUS
Status: DISCONTINUED | OUTPATIENT
Start: 2022-10-10 | End: 2022-10-11

## 2022-10-10 RX ADMIN — FAMOTIDINE 20 MG: 20 TABLET ORAL at 05:53

## 2022-10-10 RX ADMIN — LEVOTHYROXINE SODIUM 50 MCG: 50 TABLET ORAL at 05:53

## 2022-10-10 RX ADMIN — DEXTROSE AND SODIUM CHLORIDE 50 ML/HR: 5; .45 INJECTION, SOLUTION INTRAVENOUS at 18:15

## 2022-10-10 NOTE — PLAN OF CARE
Problem: Potential for Falls  Goal: Patient will remain free of falls  Description: INTERVENTIONS:  - Educate patient/family on patient safety including physical limitations  - Instruct patient to call for assistance with activity   - Consult OT/PT to assist with strengthening/mobility   - Keep Call bell within reach  - Keep bed low and locked with side rails adjusted as appropriate  - Keep care items and personal belongings within reach  - Initiate and maintain comfort rounds  - Make Fall Risk Sign visible to staff  - Offer Toileting every 2 Hours, in advance of need  - Initiate/Maintain bed/chair alarm  - Obtain necessary fall risk management equipment: alarms, walker  - Apply yellow socks and bracelet for high fall risk patients  - Consider moving patient to room near nurses station  Outcome: Progressing     Problem: Nutrition/Hydration-ADULT  Goal: Nutrient/Hydration intake appropriate for improving, restoring or maintaining nutritional needs  Description: Monitor and assess patient's nutrition/hydration status for malnutrition  Collaborate with interdisciplinary team and initiate plan and interventions as ordered  Monitor patient's weight and dietary intake as ordered or per policy  Utilize nutrition screening tool and intervene as necessary  Determine patient's food preferences and provide high-protein, high-caloric foods as appropriate       INTERVENTIONS:  - Monitor oral intake, urinary output, labs, and treatment plans  - Assess nutrition and hydration status and recommend course of action  - Evaluate amount of meals eaten  - Assist patient with eating if necessary   - Allow adequate time for meals  - Recommend/ encourage appropriate diets, oral nutritional supplements, and vitamin/mineral supplements  - Order, calculate, and assess calorie counts as needed  - Recommend, monitor, and adjust tube feedings and TPN/PPN based on assessed needs  - Assess need for intravenous fluids  - Provide specific nutrition/hydration education as appropriate  - Include patient/family/caregiver in decisions related to nutrition  Outcome: Progressing     Problem: Prexisting or High Potential for Compromised Skin Integrity  Goal: Skin integrity is maintained or improved  Description: INTERVENTIONS:  - Identify patients at risk for skin breakdown  - Assess and monitor skin integrity  - Assess and monitor nutrition and hydration status  - Monitor labs   - Assess for incontinence   - Turn and reposition patient  - Assist with mobility/ambulation  - Relieve pressure over bony prominences  - Avoid friction and shearing  - Provide appropriate hygiene as needed including keeping skin clean and dry  - Evaluate need for skin moisturizer/barrier cream  - Collaborate with interdisciplinary team   - Patient/family teaching  - Consider wound care consult   Outcome: Progressing     Problem: MOBILITY - ADULT  Goal: Maintain or return to baseline ADL function  Description: INTERVENTIONS:  -  Assess patient's ability to carry out ADLs; assess patient's baseline for ADL function and identify physical deficits which impact ability to perform ADLs (bathing, care of mouth/teeth, toileting, grooming, dressing, etc )  - Assess/evaluate cause of self-care deficits   - Assess range of motion  - Assess patient's mobility; develop plan if impaired  - Assess patient's need for assistive devices and provide as appropriate  - Encourage maximum independence but intervene and supervise when necessary  - Involve family in performance of ADLs  - Assess for home care needs following discharge   - Consider OT consult to assist with ADL evaluation and planning for discharge  - Provide patient education as appropriate  Outcome: Progressing  Goal: Maintains/Returns to pre admission functional level  Description: INTERVENTIONS:  - Perform BMAT or MOVE assessment daily    - Set and communicate daily mobility goal to care team and patient/family/caregiver     - Collaborate with rehabilitation services on mobility goals if consulted  - Perform Range of Motion 3 times a day  - Reposition patient every 2 hours    - Dangle patient 3 times a day  - Stand patient 3 times a day  - Ambulate patient 3 times a day  - Out of bed to chair 3 times a day with meals  - Out of bed for toileting  - Record patient progress and toleration of activity level   Outcome: Progressing     Problem: GENITOURINARY - ADULT  Goal: Maintains or returns to baseline urinary function  Description: INTERVENTIONS:  - Assess urinary function  - Encourage oral fluids to ensure adequate hydration if ordered  - Administer IV fluids as ordered to ensure adequate hydration  - Administer ordered medications as needed  - Offer frequent toileting  - Follow urinary retention protocol if ordered  Outcome: Progressing  Goal: Absence of urinary retention  Description: INTERVENTIONS:  - Assess patient’s ability to void and empty bladder  - Monitor I/O  - Bladder scan as needed  - Discuss with physician/AP medications to alleviate retention as needed  - Discuss catheterization for long term situations as appropriate  Outcome: Progressing     Problem: DISCHARGE PLANNING  Goal: Discharge to home or other facility with appropriate resources  Description: INTERVENTIONS:  - Identify barriers to discharge w/patient and caregiver  - Arrange for needed discharge resources and transportation as appropriate  - Identify discharge learning needs (meds, wound care, etc )  - Arrange for interpretive services to assist at discharge as needed  - Refer to Case Management Department for coordinating discharge planning if the patient needs post-hospital services based on physician/advanced practitioner order or complex needs related to functional status, cognitive ability, or social support system  Outcome: Progressing     Problem: Knowledge Deficit  Goal: Patient/family/caregiver demonstrates understanding of disease process, treatment plan, medications, and discharge instructions  Description: Complete learning assessment and assess knowledge base    Interventions:  - Provide teaching at level of understanding  - Provide teaching via preferred learning methods  Outcome: Progressing

## 2022-10-10 NOTE — PHYSICAL THERAPY NOTE
Physical Therapy Treatment Note     10/10/22 1117   PT Last Visit   PT Visit Date 10/10/22   Note Type   Note Type Treatment   Pain Assessment   Pain Assessment Tool FLACC   Pain Score No Pain   Restrictions/Precautions   Weight Bearing Precautions Per Order No   Other Precautions Cognitive; Chair Alarm; Bed Alarm; Fall Risk;Hard of hearing  (RUE flaccidity, decreased strength and ROM of RLE)   General   Chart Reviewed Yes   Subjective   Subjective Pt  agreeable to PT  Pt  in bed upon entry  Bed Mobility   Supine to Sit 3  Moderate assistance   Additional items Assist x 2;HOB elevated; Increased time required;LE management;Verbal cues   Transfers   Sit to Stand 4  Minimal assistance   Additional items Assist x 2; Increased time required;Verbal cues   Stand to Sit 4  Minimal assistance   Additional items Increased time required;Verbal cues; Assist x 2   Ambulation/Elevation   Gait pattern Improper Weight shift; Excessively slow; Short stride; Inconsistent otto;Decreased foot clearance;Decreased heel strike;Decreased toe off  (R LE instability)   Gait Assistance 4  Minimal assist   Additional items Assist x 2;Verbal cues   Assistive Device Other (Comment)  (B/L HHA)   Distance 5 steps to the chair   Balance   Static Sitting Fair +   Dynamic Sitting Fair   Static Standing Poor +   Dynamic Standing Poor   Ambulatory Poor   Activity Tolerance   Activity Tolerance Patient tolerated treatment well   Nurse Made Aware Yes   Exercises   TKR Supine;Bilateral;AAROM;20 reps   Assessment   Prognosis Guarded   Problem List Decreased strength;Decreased range of motion; Impaired balance;Decreased mobility; Decreased coordination; Impaired judgement; Impaired hearing   Assessment Pt  noted with improved mobility this session needing decreased assist for overall mobility  Pt  non verbal and [place] however able to respond appropriately nonverbally and would signal if she could not hear  pt  able to hear better on the L side   pt  pleasant and coperative t/o session for all activities  Noted RUE flaccidity  Arm in arm for all transfers and ambulation to chair  Pt  seated on chair post session with chair alarm engaged at the end of session  Will continue to follow per PT POC  pt  is below her baseline and needs A 1-2 for mobility  Goals   Patient Goals None reported   STG Expiration Date 10/19/22   PT Treatment Day 2   Plan   Treatment/Interventions Functional transfer training;LE strengthening/ROM; Therapeutic exercise;Patient/family training;Equipment eval/education; Bed mobility;Gait training;Spoke to nursing;OT   Progress Progressing toward goals   PT Frequency 2-3x/wk   Recommendation   PT Discharge Recommendation Post acute rehabilitation services   AM-PAC Basic Mobility Inpatient   Turning in Bed Without Bedrails 2   Lying on Back to Sitting on Edge of Flat Bed 2   Moving Bed to Chair 2   Standing Up From Chair 3   Walk in Room 2   Climb 3-5 Stairs 1   Basic Mobility Inpatient Raw Score 12   Basic Mobility Standardized Score 32 23   Turning Head Towards [place] 4   Follow Simple Instructions 3   Low Function Basic Mobility Raw Score 19   Low Function Basic Mobility Standardized Score 31 06   Highest Level Of Mobility   JH-HLM Goal 4: Move to chair/commode   -HLM Achieved 4: Move to chair/commode   End of Consult   Patient Position at End of Consult Bedside chair;Bed/Chair alarm activated; All needs within reach         Jonatan Hernandez AM-PAC basic mobility standardized score less than 42 9 suggest the patient may benefit from discharge to post-acute rehab services

## 2022-10-10 NOTE — OCCUPATIONAL THERAPY NOTE
Occupational Therapy Progress Note     Patient Name: Jahaira [id number] Date: 10/10/2022  Problem List  Principal Problem:    Failure to thrive in adult  Active Problems:    Seizures ([facility] [address] )    Essential hypertension    Atrial fibrillation ([facility] [address] )    [facility]    Neurosarcoidosis    Severe protein-calorie malnutrition ([facility] [address] )            10/10/22 1050   OT Last Visit   OT Visit Date 10/10/22   Note Type   Note Type Treatment   Pain Assessment   Pain Assessment Tool FLACC   Pain Score No Pain   Pain Rating: FLACC (Rest) - Face 0   Pain Rating: FLACC (Rest) - Legs 0   Pain Rating: FLACC (Rest) - Activity 0   Pain Rating: FLACC (Rest) - Cry 0   Pain Rating: FLACC (Rest) - Consolability 0   Score: FLACC (Rest) 0   Restrictions/Precautions   Weight Bearing Precautions Per Order No   Other Precautions Cognitive; Chair Alarm; Bed Alarm; Fall Risk;Hard of hearing  (non verbal)   Functional Standing Tolerance   Time 1 min   Activity standing balance   Comments tolerated standing ~1 min with minAx 2 with poor alance  Bed Mobility   Supine to Sit 3  Moderate assistance   Additional items Assist x 2;HOB elevated; Bedrails; Increased time required   Transfers   Sit to Stand 4  Minimal assistance   Additional items Assist x 2; Increased time required   Stand to Sit 4  Minimal assistance   Additional items Assist x 2; Increased time required   Sit pivot 4  Minimal assistance   Additional items Assist x 2; Increased time required;Verbal cues   Therapeutic Exercise - ROM   UE-ROM Yes   ROM- Right Upper Extremities   R Shoulder PROM   R Elbow PROM   R Wrist PROM   R Hand PROM   R Position Seated   ROM - Left Upper Extremities    L Shoulder AROM; Flexion; Extension   L Elbow AROM;Elbow flexion;Elbow extension   L Weight/Reps/Sets 2x10   Additional Activities   Additional Activities Other (Comment)  (Sitting balance activity at EOB)   Additional Activities Comments Pt challenged with sitting EOB for ~5 min to increase core strength and trunk control  pt challenged with reaching forward and laterally outside GREG while crossing midline  tolerated well, demo sitting balance of fair -  no LOB noted   Activity Tolerance   Activity Tolerance Patient tolerated treatment well   Medical Staff Made Aware yes joycelyn PRADO   Assessment   Assessment Pt seen for OT tx this date to focus on sitting balance, transfer training and bed mobility to increase independence and decrease reliance on caregivers  Pt performed bed mob supine> sit with modA x 2 , sit<> stand transfers Sea x 2, stand pivot to bedside chair Sea x 2  Pt challenged with seated unsupported reaching activity and LUE ROM and RUE PROM exercises  Pt tolerated session well  Pt [place] requiring increased volume  Pt also with flaccidity of the RUE  Noted improvement in functional ability since previous session but pt will require 24 hour care and supervision at home  OT DC recommendation STR/LTC vs home with family at primary caregiver  Plan   Treatment Interventions Functional transfer training;UE strengthening/ROM; Endurance training;Patient/family training   Goal Expiration Date 10/19/22   OT Treatment Day 1   OT Frequency 2-3x/wk   Recommendation   OT Discharge Recommendation Post acute rehabilitation services  (vs home with son as full time caregiver )   Additional Comments  The patient's raw score on the AM-PAC Daily Activity inpatient short form is 10, standardized score is39 4  , less than 39 4  Patients at this level are likely to benefit from discharge to post-acute rehabilitation services  Please refer to the recommendation of the Occupational Therapist for safe discharge planning     AM-PAC Daily Activity Inpatient   Lower Body Dressing 1   Bathing 1   Toileting 1   Upper Body Dressing 2   Grooming 2   Eating 3   Daily Activity Raw Score 10   AM-PAC Applied Cognition Inpatient   Following a Speech/Presentation 2   Understanding Ordinary Conversation 2   Taking Medications 1 Remembering Where Things Are Placed or Put Away 1   Remembering List of 4-5 Errands 1   Taking Care of Complicated Tasks 1   Applied Cognition Raw Score 8   Applied Cognition Standardized Score 19 32     Pranay Crain, OT

## 2022-10-10 NOTE — PLAN OF CARE
Problem: PHYSICAL THERAPY ADULT  Goal: Performs mobility at highest level of function for planned discharge setting  See evaluation for individualized goals  Description: Treatment/Interventions: Functional transfer training, LE strengthening/ROM, Therapeutic exercise, Endurance training, Patient/family training, Bed mobility, Gait training, Spoke to nursing, OT, Spoke to case management  Equipment Recommended: Other (Comment) (monitor)       See flowsheet documentation for full assessment, interventions and recommendations  Outcome: Progressing  Note: Prognosis: Guarded  Problem List: Decreased strength, Decreased range of motion, Impaired balance, Decreased mobility, Decreased coordination, Impaired judgement, Impaired hearing  Assessment: Pt  noted with improved mobility this session needing decreased assist for overall mobility  Pt  non verbal and [place] however able to respond appropriately nonverbally and would signal if she could not hear  pt  able to hear better on the L side  pt  pleasant and coperative t/o session for all activities  Noted RUE flaccidity  Arm in arm for all transfers and ambulation to chair  Pt  seated on chair post session with chair alarm engaged at the end of session  Will continue to follow per PT POC  pt  is below her baseline and needs A 1-2 for mobility  PT Discharge Recommendation: Post acute rehabilitation services    See flowsheet documentation for full assessment

## 2022-10-10 NOTE — PLAN OF CARE
Problem: OCCUPATIONAL THERAPY ADULT  Goal: Performs self-care activities at highest level of function for planned discharge setting  See evaluation for individualized goals  Description: Treatment Interventions: ADL retraining, Functional transfer training, UE strengthening/ROM, Endurance training, Patient/family training, Equipment evaluation/education, Compensatory technique education, Continued evaluation, Activityengagement          See flowsheet documentation for full assessment, interventions and recommendations  Outcome: Progressing  Note: Limitation: Decreased ADL status, Decreased UE strength, Decreased UE ROM, Decreased Safe judgement during ADL, Decreased cognition, Decreased endurance, Decreased self-care trans, Decreased high-level ADLs, Decreased fine motor control, Non-func R UE  Prognosis: Poor  Assessment: Pt seen for OT tx this date to focus on sitting balance, transfer training and bed mobility to increase independence and decrease reliance on caregivers  Pt performed bed mob supine> sit with modA x 2 , sit<> stand transfers Sea x 2, stand pivot to bedside chair Sea x 2  Pt challenged with seated unsupported reaching activity and LUE ROM and RUE PROM exercises  Pt tolerated session well  Pt [place] requiring increased volume  Pt also with flaccidity of the RUE  Noted improvement in functional ability since previous session but pt will require 24 hour care and supervision at home  OT DC recommendation STR/LTC vs home with family at primary caregiver       OT Discharge Recommendation: Post acute rehabilitation services (vs home with son as full time caregiver )

## 2022-10-10 NOTE — PROGRESS NOTES
[address] [facility]  Progress Note - Deb Hernandez 1945, 76 y o  female MRN: [id number]  Unit/Bed#: E4 -01 Encounter: [id number]  Primary Care Provider: Min Haney MD   Date and time admitted to hospital: 10/4/2022  9:48 AM    Addendum:  Padilla Lin any oral intake today  Minimal urine output  Will place on D5 half-normal saline 50 cc/hour for total of 500 cc  Updated son over the phone  * Failure to thrive in adult  Assessment & Plan  78-year-old female with past medical history of neurosarcoidosis, seizures, hypertension and AFib presents with failure to thrive  CT head negative for acute processes  Son reporting that over the past several months, patient has had worsening appetite, dysphagia and appears to be confused at times and now mainly bedbound  Continue mirtazapine at bedtime and zyprexa prn  Pending placement for short-term rehab, may need hospice if patient continues to decline, family agreement with plan  Difficult placement, per case management, require 2nd shot of COVID vaccine, scheduled 10/25    Severe protein-calorie malnutrition ([facility] [address] )  Assessment & Plan  Malnutrition Findings:   Adult Malnutrition type: Chronic illness  Adult Degree of Malnutrition: Other severe protein calorie malnutrition  Malnutrition Characteristics: Inadequate energy, Weight loss                  360 Statement: in setting of Failure to Thrive, poor po intake over the last several months, <75% energy intake compared to estimated needs for > 1 mo, wt loss (*8/5/2022 167 5 lbs - *10/6/22 141 lbs  *16% wt loss x 2 mo), treated with diet and supplements as tolerated    BMI Findings: Body mass index is 29 37 kg/m²         Neurosarcoidosis  Assessment & Plan  Continue home prednisone dose    Chronic systolic heart failure [facility])  Assessment & Plan  Wt Readings from Last 3 Encounters:   10/09/22 63 7 kg (140 lb 8 3 oz)   08/31/22 64 8 kg (142 lb 13 7 oz)   08/30/22 69 8 kg (153 lb 14 1 oz) Continue to hold diuretics, poor appetite, may need to discontinue on discharge    Atrial fibrillation (HCC)  Assessment & Plan  EKG showing Afib  Not on anticoagulation  Rate control not on AV shelly blockers    Essential hypertension  Assessment & Plan  Hold home BP meds, blood pressure soft      Seizures (HCC)  Assessment & Plan  No recent seizures  Continue lamictal and phenytoin          VTE Pharmacologic Prophylaxis:   Moderate Risk (Score 3-4) - Pharmacological DVT Prophylaxis Contraindicated  Sequential Compression Devices Ordered  Patient Centered Rounds: I performed bedside rounds with nursing staff today  Discussions with Specialists or Other Care Team Provider:  Nursing, case management    Education and Discussions with Family / Patient: patient    Time Spent for Care: 30 minutes  More than 50% of total time spent on counseling and coordination of care as described above  Current Length of Stay: 6 day(s)  Current Patient Status: Inpatient   Certification Statement: The patient will continue to require additional inpatient hospital stay due to Pending placement  Discharge Plan: Patient is medically stable, pending placement    Code Status: Level 3 - DNAR and DNI    Subjective:   Patient was seen evaluated bedside  No overnight events per nursing staff  Patient has no complaints     Objective:     Vitals:   Temp (24hrs), Av 6 °F (36 4 °C), Min:97 2 °F (36 2 °C), Max:98 °F (36 7 °C)    Temp:  [97 2 °F (36 2 °C)-98 °F (36 7 °C)] 98 °F (36 7 °C)  HR:  [57-75] 65  Resp:  [18] 18  BP: (106-110)/(60-62) 109/62  SpO2:  [96 %-99 %] 96 %  Body mass index is 29 37 kg/m²  Input and Output Summary (last 24 hours): Intake/Output Summary (Last 24 hours) at 10/10/2022 1115  Last data filed at 10/9/2022 1201  Gross per 24 hour   Intake 120 ml   Output --   Net 120 ml       Physical Exam:   Physical Exam  Constitutional:       General: She is not in acute distress  HENT:      Head: Atraumatic  Cardiovascular:      Rate and Rhythm: Normal rate and regular rhythm  Heart sounds: No murmur heard  No friction rub  No gallop  Pulmonary:      Effort: Pulmonary effort is normal  No respiratory distress  Breath sounds: Normal breath sounds  No stridor  No wheezing  Abdominal:      General: Bowel sounds are normal  There is no distension  Palpations: Abdomen is soft  Tenderness: There is no abdominal tenderness  Musculoskeletal:         General: No swelling  Cervical back: Neck supple  Skin:     General: Skin is warm and dry  Neurological:      General: No focal deficit present  Mental Status: She is alert  Psychiatric:         Mood and Affect: Mood normal           Additional Data:     Labs:  Results from last 7 days   Lab Units 10/06/22  0519   WBC Thousand/uL 7 38   HEMOGLOBIN g/dL 12 5   HEMATOCRIT % 39 2   PLATELETS Thousands/uL 177   NEUTROS PCT % 62   LYMPHS PCT % 24   MONOS PCT % 10   EOS PCT % 3     Results from last 7 days   Lab Units 10/06/22  0519 10/05/22  0502 10/04/22  1047   SODIUM mmol/L 143   < > 140   POTASSIUM mmol/L 3 8   < > 4 0   CHLORIDE mmol/L 108   < > 102   CO2 mmol/L 27   < > 31   BUN mg/dL 8   < > 16   CREATININE mg/dL 1 00   < > 1 45*   ANION GAP mmol/L 8   < > 7   CALCIUM mg/dL 8 6   < > 8 9   ALBUMIN g/dL  --   --  3 4*   TOTAL BILIRUBIN mg/dL  --   --  0 83   ALK PHOS U/L  --   --  122*   ALT U/L  --   --  16   AST U/L  --   --  38   GLUCOSE RANDOM mg/dL 86   < > 75    < > = values in this interval not displayed           Results from last 7 days   Lab Units 10/07/22  0727 10/06/22  1614 10/06/22  1126 10/06/22  0729 10/05/22  2108 10/05/22  1537 10/05/22  1129 10/05/22  0835 10/05/22  0738 10/04/22  2104   POC GLUCOSE mg/dl 89 117 105 73 100 139 109 68 59* 148*               Lines/Drains:  Invasive Devices  Report    Peripheral Intravenous Line  Duration           Peripheral IV 10/09/22 Distal;Left;Ventral (anterior) Forearm 1 day Imaging: Reviewed radiology reports from this admission including: CT head    Recent Cultures (last 7 days):         Last 24 Hours Medication List:   Current Facility-Administered Medications   Medication Dose Route Frequency Provider Last Rate   • acetaminophen  650 mg Oral Q6H PRN Edel Sim MD     • allopurinol  100 mg Oral Daily Edel Sim MD     • aspirin  81 mg Oral Every Other Day Edel Sim MD     • atorvastatin  10 mg Oral Daily With Fuad Lorenz MD     • famotidine  20 mg Oral Early Morning Edel Sim MD     • lamoTRIgine  250 mg Oral BID Edel Sim MD     • levothyroxine  50 mcg Oral Early Morning Edel Sim MD     • LORazepam  0 5 mg Oral Q8H PRN Edel Sim MD     • OLANZapine  5 mg Intramuscular Q6H PRN Edel Sim MD     • ondansetron  4 mg Intravenous Q6H PRN Edel Sim MD     • phenytoin  100 mg Oral Daily Edel Sim MD     • predniSONE  7 5 mg Oral Daily Edel Sim MD     • QUEtiapine  25 mg Oral HS Edel Sim MD          Today, Patient Was Seen By: Juni Baeza    **Please Note: This note may have been constructed using a voice recognition system  **

## 2022-10-10 NOTE — CASE MANAGEMENT
Case Management Discharge Planning Note    Patient name Mariajose Wilkerson  Location [address] [place] [address]-* MRN [id number]  : 1945 Date 10/10/2022       Current Admission Date: 10/4/2022  Current Admission Diagnosis:Failure to thrive in adult   Patient Active Problem List    Diagnosis Date Noted   • Severe protein-calorie malnutrition ([facility] ) 10/09/2022   • Failure to thrive in adult 10/04/2022   • Seizures ([facility] [address] ) 2021   • Stroke ([facility] ) 2021   • Essential hypertension 2021   • Hypothyroidism 2021   • Hyperlipidemia 2021   • Atrial fibrillation ([facility] ) 2021   • Chronic systolic heart failure ([facility] ) 2021   • Neurosarcoidosis 2021      LOS (days): 6  Geometric Mean LOS (GMLOS) (days): 3 10  Days to GMLOS:-2 9     OBJECTIVE:  Risk of Unplanned Readmission Score: 19 62         Current admission status: Inpatient   Preferred Pharmacy:   [id number]  Phone: [phone] Fax: [phone]    Primary Care Provider: Alia Rivera MD    Primary Insurance: [facility]  Secondary Insurance:     DISCHARGE DETAILS:          Additional Comments: [facility]  building will NO longer accept pt due to behaviors  Made new referrals and son is aware of this  Pt also needs the second COVID shot 10/25/2022

## 2022-10-10 NOTE — ASSESSMENT & PLAN NOTE
75-year-old female with past medical history of neurosarcoidosis, seizures, hypertension and AFib presents with failure to thrive  CT head negative for acute processes  Son reporting that over the past several months, patient has had worsening appetite, dysphagia and appears to be confused at times and now mainly bedbound  Continue mirtazapine at bedtime and zyprexa prn  Pending placement for short-term rehab, may need hospice if patient continues to decline, family agreement with plan  Difficult placement, per case management, require 2nd shot of COVID vaccine, scheduled 10/25

## 2022-10-11 PROBLEM — E87.6 HYPOKALEMIA: Status: ACTIVE | Noted: 2022-10-11

## 2022-10-11 LAB
ANION GAP SERPL CALCULATED.3IONS-SCNC: 5 MMOL/L (ref 4–13)
BASOPHILS # BLD AUTO: 0.06 THOUSANDS/ÂΜL (ref 0–0.1)
BASOPHILS NFR BLD AUTO: 1 % (ref 0–1)
BUN SERPL-MCNC: 7 MG/DL (ref 5–25)
CALCIUM SERPL-MCNC: 8.4 MG/DL (ref 8.3–10.1)
CHLORIDE SERPL-SCNC: 109 MMOL/L (ref 96–108)
CO2 SERPL-SCNC: 28 MMOL/L (ref 21–32)
CREAT SERPL-MCNC: 1.12 MG/DL (ref 0.6–1.3)
EOSINOPHIL # BLD AUTO: 0.28 THOUSAND/ÂΜL (ref 0–0.61)
EOSINOPHIL NFR BLD AUTO: 4 % (ref 0–6)
ERYTHROCYTE [DISTWIDTH] IN BLOOD BY AUTOMATED COUNT: 16 % (ref 11.6–15.1)
FLUAV RNA RESP QL NAA+PROBE: NEGATIVE
FLUBV RNA RESP QL NAA+PROBE: NEGATIVE
GFR SERPL CREATININE-BSD FRML MDRD: 46 ML/MIN/1.73SQ M
GLUCOSE SERPL-MCNC: 90 MG/DL (ref 65–140)
HCT VFR BLD AUTO: 36.6 % (ref 34.8–46.1)
HGB BLD-MCNC: 11.9 G/DL (ref 11.5–15.4)
IMM GRANULOCYTES # BLD AUTO: 0.03 THOUSAND/UL (ref 0–0.2)
IMM GRANULOCYTES NFR BLD AUTO: 0 % (ref 0–2)
LYMPHOCYTES # BLD AUTO: 2.03 THOUSANDS/ÂΜL (ref 0.6–4.47)
LYMPHOCYTES NFR BLD AUTO: 28 % (ref 14–44)
MCH RBC QN AUTO: 31.8 PG (ref 26.8–34.3)
MCHC RBC AUTO-ENTMCNC: 32.5 G/DL (ref 31.4–37.4)
MCV RBC AUTO: 98 FL (ref 82–98)
MONOCYTES # BLD AUTO: 0.74 THOUSAND/ÂΜL (ref 0.17–1.22)
MONOCYTES NFR BLD AUTO: 10 % (ref 4–12)
NEUTROPHILS # BLD AUTO: 4.2 THOUSANDS/ÂΜL (ref 1.85–7.62)
NEUTS SEG NFR BLD AUTO: 57 % (ref 43–75)
NRBC BLD AUTO-RTO: 0 /100 WBCS
PLATELET # BLD AUTO: 171 THOUSANDS/UL (ref 149–390)
PMV BLD AUTO: 10.3 FL (ref 8.9–12.7)
POTASSIUM SERPL-SCNC: 3.2 MMOL/L (ref 3.5–5.3)
RBC # BLD AUTO: 3.74 MILLION/UL (ref 3.81–5.12)
RSV RNA RESP QL NAA+PROBE: NEGATIVE
SARS-COV-2 RNA RESP QL NAA+PROBE: NEGATIVE
SODIUM SERPL-SCNC: 142 MMOL/L (ref 135–147)
WBC # BLD AUTO: 7.34 THOUSAND/UL (ref 4.31–10.16)

## 2022-10-11 PROCEDURE — 97530 THERAPEUTIC ACTIVITIES: CPT

## 2022-10-11 PROCEDURE — 97116 GAIT TRAINING THERAPY: CPT

## 2022-10-11 PROCEDURE — 0241U HB NFCT DS VIR RESP RNA 4 TRGT: CPT | Performed by: INTERNAL MEDICINE

## 2022-10-11 PROCEDURE — 80048 BASIC METABOLIC PNL TOTAL CA: CPT | Performed by: INTERNAL MEDICINE

## 2022-10-11 PROCEDURE — 85025 COMPLETE CBC W/AUTO DIFF WBC: CPT | Performed by: INTERNAL MEDICINE

## 2022-10-11 PROCEDURE — 99232 SBSQ HOSP IP/OBS MODERATE 35: CPT | Performed by: INTERNAL MEDICINE

## 2022-10-11 RX ORDER — DEXTROSE, SODIUM CHLORIDE, AND POTASSIUM CHLORIDE 5; .45; .3 G/100ML; G/100ML; G/100ML
50 INJECTION INTRAVENOUS CONTINUOUS
Status: DISPENSED | OUTPATIENT
Start: 2022-10-11 | End: 2022-10-12

## 2022-10-11 RX ADMIN — PREDNISONE 7.5 MG: 5 TABLET ORAL at 09:04

## 2022-10-11 RX ADMIN — DEXTROSE, SODIUM CHLORIDE, AND POTASSIUM CHLORIDE 50 ML/HR: 5; .45; .3 INJECTION INTRAVENOUS at 18:42

## 2022-10-11 RX ADMIN — ALLOPURINOL 100 MG: 100 TABLET ORAL at 09:04

## 2022-10-11 RX ADMIN — ATORVASTATIN CALCIUM 10 MG: 10 TABLET, FILM COATED ORAL at 17:11

## 2022-10-11 RX ADMIN — PHENYTOIN SODIUM 100 MG: 100 CAPSULE ORAL at 09:04

## 2022-10-11 RX ADMIN — QUETIAPINE FUMARATE 25 MG: 25 TABLET ORAL at 21:07

## 2022-10-11 RX ADMIN — LAMOTRIGINE 250 MG: 25 TABLET ORAL at 17:11

## 2022-10-11 NOTE — OCCUPATIONAL THERAPY NOTE
Occupational Therapy Progress Note     Patient Name: Judah KEENE'U Date: 10/11/2022  Problem List  Principal Problem:    Failure to thrive in adult  Active Problems:    Seizures ([facility] [address] )    Essential hypertension    Atrial fibrillation ([facility] [address] )    Chronic systolic heart failure ([facility] [address] )    Neurosarcoidosis    Severe protein-calorie malnutrition ([facility] [address] )        10/11/22 1126   OT Last Visit   OT Visit Date 10/11/22   Note Type   Note Type Treatment   Pain Assessment   Pain Assessment Tool Good-Baker FACES   Pain Score No Pain   Good-Baker FACES Pain Rating 0   Restrictions/Precautions   Weight Bearing Precautions Per Order No   Other Precautions Cognitive; Chair Alarm; Bed Alarm;Limb alert; Fall Risk   Functional Standing Tolerance   Time 1 min x 3   Activity static standing   Comments demonstrated fair - balance   Bed Mobility   Supine to Sit 3  Moderate assistance   Additional items Assist x 1;Bedrails;HOB elevated; Increased time required   Transfers   Sit to Stand 4  Minimal assistance   Additional items Assist x 2; Increased time required   Stand to Sit 4  Minimal assistance   Additional items Assist x 1; Increased time required   Sit pivot 4  Minimal assistance   Additional items Assist x 2; Increased time required   Additional Activities   Additional Activities Comments Pt tolerated sitting EOB for ~5 min to challenge balance and increase trunk strength and core control  tolerated well with fair/ fair - sitting balance   Activity Tolerance   Activity Tolerance Patient tolerated treatment well   Medical Staff Boris Malagon 79 RN   Assessment   Assessment Pt seen for OT tx this date with focus on transfer training  Pt's 2 sons at bedside during session  Pt agreeable to skilled therapy  Pt more verbal during session  Pt with expression aphasia  Pt completed bed mob supine> sit with HOB elevated with modA x 1  Sit<> stands from EOB and bedside chair Sea x 1-2  Pt requires assist managing right arm   Pt completed stand pivot transfers with min- modA x 2  Tolerated session well with noted improvement in participation and strength  Pt left up in bedside chair with alarm in place and call light in reach  nursing aware  OT DC recommendation : STR vs home (if family is able to provide 24 hour care and support)  Plan   Treatment Interventions ADL retraining;Functional transfer training;UE strengthening/ROM; Endurance training;Patient/family training   Goal Expiration Date 10/19/22   OT Treatment Day 2   OT Frequency 2-3x/wk   Recommendation   OT Discharge Recommendation Post acute rehabilitation services  (vs home, if family able to manage 24 hour care and support for pt )   Additional Comments  The patient's raw score on the AM-PAC Daily Activity inpatient short form is 10, standardized score is  , less than 39 4  Patients at this level are likely to benefit from discharge to post-acute rehabilitation services  Please refer to the recommendation of the Occupational Therapist for safe discharge planning     AM-PAC Daily Activity Inpatient   Lower Body Dressing 1   Bathing 1   Toileting 1   Upper Body Dressing 2   Grooming 2   Eating 3   Daily Activity Raw Score 10   AM-PAC Applied Cognition Inpatient   Following a Speech/Presentation 2   Understanding Ordinary Conversation 2   Taking Medications 1   Remembering Where Things Are Placed or Put Away 1   Remembering List of 4-5 Errands 1   Taking Care of Complicated Tasks 1   Applied Cognition Raw Score 8   Applied Cognition Standardized Score 19 32   Howard Harvey, OT

## 2022-10-11 NOTE — PLAN OF CARE
Problem: OCCUPATIONAL THERAPY ADULT  Goal: Performs self-care activities at highest level of function for planned discharge setting  See evaluation for individualized goals  Description: Treatment Interventions: ADL retraining, Functional transfer training, UE strengthening/ROM, Endurance training, Patient/family training, Equipment evaluation/education, Compensatory technique education, Continued evaluation, Activityengagement          See flowsheet documentation for full assessment, interventions and recommendations  Outcome: Progressing  Note: Limitation: Decreased ADL status, Decreased UE strength, Decreased UE ROM, Decreased Safe judgement during ADL, Decreased cognition, Decreased endurance, Decreased self-care trans, Decreased high-level ADLs, Decreased fine motor control, Non-func R UE  Prognosis: Poor  Assessment: Pt seen for OT tx this date with focus on transfer training  Pt's 2 sons at bedside during session  Pt agreeable to skilled therapy  Pt more verbal during session  Pt with expression aphasia  Pt completed bed mob supine> sit with HOB elevated with modA x 1  Sit<> stands from EOB and bedside chair Sea x 1-2  Pt requires assist managing right arm  Pt completed stand pivot transfers with min- modA x 2  Tolerated session well with noted improvement in participation and strength  Pt left up in bedside chair with alarm in place and call light in reach  nursing aware  OT DC recommendation : STR vs home (if family is able to provide 24 hour care and support)       OT Discharge Recommendation: Post acute rehabilitation services (vs home, if family able to manage 24 hour care and support for pt )

## 2022-10-11 NOTE — PROGRESS NOTES
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, post hemorrhagic epilepsy, neurosarcoidosis, hypertension and AFib presents with failure to thrive  · After her hemorrhagic stroke she has right-sided residual weakness and she has been dysarthric, reviewing notes from Neurology in 2019 and admissions in May 2021  ·  08/05/2022 patient had an unwitnessed fall, she was found down by her son with a bleeding head wound  According to ED note she was not oriented to place person or time and her speech was dysarthric, but according to his son that was her baseline  · She was seen multiple times in the ED for various medical issues like vomiting, dizziness, fatigue  Seems like she had multiple issues associated with her chronic disabilities  On every ED visit she was in no distress, with mentation back Ferdeena Choi and waning again which is her baseline per son  She had total of 4 CT heads in the past 2 months which were all negative for acute pathology  · She presented to the ED this time with failure to thrive  Per son for the past few weeks she has not been eating and drinking well and she is just declining  Mostly bedbound    · CT head negative for acute processes  · Continue Seroquel 25 mg HS  · Pending placement for short-term rehab, may need hospice if patient continues to decline, family agreement with plan  · Today family at bedside, she is awake and alert, per family this is the best she has been in the past month  · Plan to discharge tomorrow to rehab    Hypokalemia  Assessment & Plan  · Potassium 3 2  · D5 half normal saline with 40 mEq of K at 50 cc/hour    Severe protein-calorie malnutrition ([address] )  Assessment & Plan  Malnutrition Findings:   Adult Malnutrition type: Chronic illness  Adult Degree of Malnutrition: Other severe protein calorie malnutrition  Malnutrition Characteristics: Inadequate energy, Weight loss                  360 Statement: in setting of Failure to Thrive, poor po intake over the last several months, <75% energy intake compared to estimated needs for > 1 mo, wt loss (*8/5/2022 167 5 lbs - *10/6/22 141 lbs  *16% wt loss x 2 mo), treated with diet and supplements as tolerated    BMI Findings: Body mass index is 29 26 kg/m²  Neurosarcoidosis  Assessment & Plan  · Continue home prednisone dose    Chronic systolic heart failure (HCC)  Assessment & Plan  Wt Readings from Last 3 Encounters:   10/11/22 63 5 kg (139 lb 15 9 oz)   08/31/22 64 8 kg (142 lb 13 7 oz)   08/30/22 69 8 kg (153 lb 14 1 oz)     · Echo from 2019 showed ejection fraction from 35-40%  · At baseline she is on Lasix 40 mg daily  · Continue to hold diuretics, poor appetite, may need to discontinue on discharge    Atrial fibrillation (HCC)  Assessment & Plan  · EKG showing Afib  · Not on anticoagulation  · Rate control not on AV shelly blockers    Essential hypertension  Assessment & Plan  · Hold home BP meds, BP well controlled without meds      Seizures (HCC)  Assessment & Plan  · No recent seizures  · Continue lamictal and phenytoin        VTE Pharmacologic Prophylaxis:   Moderate Risk (Score 3-4) - Pharmacological DVT Prophylaxis Contraindicated  Sequential Compression Devices Ordered  Patient Centered Rounds: I performed bedside rounds with nursing staff today  Discussions with Specialists or Other Care Team Provider:  Nursing, case management    Education and Discussions with Family / Patient: Updated  (2 sons) at bedside      Time Spent for Care: 30 minutes  More than 50% of total time spent on counseling and coordination of care as described above  Current Length of Stay: 7 day(s)  Current Patient Status: Inpatient   Certification Statement: The patient will continue to require additional inpatient hospital stay due to Failure to thrive  Discharge Plan: Anticipate discharge tomorrow to rehab facility  Code Status: Level 3 - DNAR and DNI    Subjective:   Patient was seen evaluated bedside  Both sons at bedside  Patient is awake and alert, per family most awake and alert in the past month  Patient denies any complaints  Objective:     Vitals:   Temp (24hrs), Av 2 °F (36 2 °C), Min:97 2 °F (36 2 °C), Max:97 3 °F (36 3 °C)    Temp:  [97 2 °F (36 2 °C)-97 3 °F (36 3 °C)] 97 3 °F (36 3 °C)  HR:  [59-60] 59  Resp:  [18] 18  BP: (110-134)/(57-60) 134/60  SpO2:  [93 %-99 %] 99 %  Body mass index is 29 26 kg/m²  Input and Output Summary (last 24 hours): Intake/Output Summary (Last 24 hours) at 10/11/2022 1742  Last data filed at 10/11/2022 0900  Gross per 24 hour   Intake 60 ml   Output 300 ml   Net -240 ml       Physical Exam:   Physical Exam  Constitutional:       General: She is not in acute distress  Comments: Dysarthric   HENT:      Head: Atraumatic  Cardiovascular:      Rate and Rhythm: Normal rate and regular rhythm  Heart sounds: No murmur heard  No friction rub  No gallop  Pulmonary:      Effort: Pulmonary effort is normal  No respiratory distress  Breath sounds: Normal breath sounds  No wheezing  Abdominal:      General: Bowel sounds are normal  There is no distension  Palpations: Abdomen is soft  Tenderness: There is no abdominal tenderness  Musculoskeletal:         General: No swelling  Cervical back: Neck supple  Skin:     General: Skin is warm and dry  Neurological:      Mental Status: She is alert        Comments: Dysarthria, right hemiplegia--chronic   Psychiatric: Mood and Affect: Mood normal           Additional Data:     Labs:  Results from last 7 days   Lab Units 10/11/22  0613   WBC Thousand/uL 7 34   HEMOGLOBIN g/dL 11 9   HEMATOCRIT % 36 6   PLATELETS Thousands/uL 171   NEUTROS PCT % 57   LYMPHS PCT % 28   MONOS PCT % 10   EOS PCT % 4     Results from last 7 days   Lab Units 10/11/22  0613   SODIUM mmol/L 142   POTASSIUM mmol/L 3 2*   CHLORIDE mmol/L 109*   CO2 mmol/L 28   BUN mg/dL 7   CREATININE mg/dL 1 12   ANION GAP mmol/L 5   CALCIUM mg/dL 8 4   GLUCOSE RANDOM mg/dL 90         Results from last 7 days   Lab Units 10/07/22  0727 10/06/22  1614 10/06/22  1126 10/06/22  0729 10/05/22  2108 10/05/22  1537 10/05/22  1129 10/05/22  0835 10/05/22  0738 10/04/22  2104   POC GLUCOSE mg/dl 89 117 105 73 100 139 109 68 59* 148*               Lines/Drains:  Invasive Devices  Report    Peripheral Intravenous Line  Duration           Peripheral IV 10/09/22 Distal;Left;Ventral (anterior) Forearm 2 days                      Imaging: Reviewed radiology reports from this admission including: CT head    Recent Cultures (last 7 days):         Last 24 Hours Medication List:   Current Facility-Administered Medications   Medication Dose Route Frequency Provider Last Rate   • acetaminophen  650 mg Oral Q6H PRN George Madrid MD     • allopurinol  100 mg Oral Daily George Madrid MD     • aspirin  81 mg Oral Every Other Day George Madrid MD     • atorvastatin  10 mg Oral Daily With Audrey Sicard, MD     • dextrose 5 % and sodium chloride 0 45 % with KCl 40 mEq/L  50 mL/hr Intravenous Continuous Janelle Mancuso MD     • famotidine  20 mg Oral Early Morning George Madrid MD     • lamoTRIgine  250 mg Oral BID Janelle Mancuso MD     • levothyroxine  50 mcg Oral Early Morning George Madrid MD     • LORazepam  0 5 mg Oral Q8H PRN George Madrid MD     • OLANZapine  5 mg Intramuscular Q6H PRN George Madrid MD     • ondansetron  4 mg Intravenous Q6H PRN Ajay Eric Bernard Mcgill MD     • phenytoin  100 mg Oral Daily Sherlyn Woo MD     • predniSONE  7 5 mg Oral Daily Sherlyn Woo MD     • QUEtiapine  25 mg Oral HS Sherlyn Woo MD          Today, Patient Was Seen By: Alisa Becerra    **Please Note: This note may have been constructed using a voice recognition system  **

## 2022-10-11 NOTE — ASSESSMENT & PLAN NOTE
Wt Readings from Last 3 Encounters:   10/11/22 63 5 kg (139 lb 15 9 oz)   08/31/22 64 8 kg (142 lb 13 7 oz)   08/30/22 69 8 kg (153 lb 14 1 oz)     · Echo from 2019 showed ejection fraction from 35-40%  · At baseline she is on Lasix 40 mg daily  · Continue to hold diuretics, poor appetite, may need to discontinue on discharge

## 2022-10-11 NOTE — ASSESSMENT & PLAN NOTE
Malnutrition Findings:   Adult Malnutrition type: Chronic illness  Adult Degree of Malnutrition: Other severe protein calorie malnutrition  Malnutrition Characteristics: Inadequate energy, Weight loss                  360 Statement: in setting of Failure to Thrive, poor po intake over the last several months, <75% energy intake compared to estimated needs for > 1 mo, wt loss (*8/5/2022 167 5 lbs - *10/6/22 141 lbs  *16% wt loss x 2 mo), treated with diet and supplements as tolerated    BMI Findings: Body mass index is 29 26 kg/m²

## 2022-10-11 NOTE — PHYSICAL THERAPY NOTE
Physical Therapy Treatment Note     10/11/22 1201   PT Last Visit   PT Visit Date 10/11/22   Note Type   Note Type Treatment   Restrictions/Precautions   Weight Bearing Precautions Per Order No   Other Precautions Cognitive; Chair Alarm; Fall Risk;Hard of hearing  (RUE flaccidity)   General   Chart Reviewed Yes   Family/Caregiver Present Yes   Subjective   Subjective Pt  in bed upon entry  Agreeable to PT   Bed Mobility   Supine to Sit 3  Moderate assistance   Additional items Assist x 1; Increased time required;HOB elevated; Bedrails;LE management;Verbal cues   Additional Comments Unsupported sitting at EOB for ~ 5 min  Cues for postural correction   Transfers   Sit to Stand 4  Minimal assistance   Additional items Assist x 2; Increased time required;Verbal cues;Armrests   Stand to Sit 4  Minimal assistance   Additional items Assist x 1; Increased time required;Verbal cues;Armrests   Additional Comments STS transfers from chair with A x 1 with tactile cues   Ambulation/Elevation   Gait pattern Improper Weight shift;Decreased foot clearance; Inconsistent otto; Short stride; Excessively slow;Decreased heel strike;Decreased toe off   Gait Assistance   (Mod - Min A)   Additional items Assist x 2;Verbal cues   Assistive Device Other (Comment)  (B/L HHA)   Distance 8 steps to the chair   Balance   Static Sitting Fair +   Dynamic Sitting Fair   Static Standing Fair   Dynamic Standing Fair -   Ambulatory Fair -   Activity Tolerance   Activity Tolerance Patient tolerated treatment well   Nurse Made Aware Yes   Assessment   Prognosis Guarded   Problem List Decreased strength;Decreased range of motion; Impaired balance;Decreased mobility; Impaired judgement;Decreased cognition;Decreased coordination;Decreased safety awareness; Impaired hearing   Assessment Pt  progressing well with mobility  Pt  needed Mod A x 1 for supine to sit transfer and use of bedrails with HOB elevated  pt can hear better on the L side   pt  was talking today though most were not making sense  Repepated STS transfers from chair and maintained static standing with Mod A x 1 on the R and LUE on chair  LE weakness and instability noted  Pt  also able to stand for pericare  Pt  seated on chair with alarm on and all needs within reach  Will continue to follow per PT POC as patient functionaing below her baseline  Family reported patient was ambulating with RW prior to her last concussion two month ago  Barriers to Discharge None   Goals   Patient Goals None reported   STG Expiration Date 10/19/22   PT Treatment Day 3   Plan   Treatment/Interventions Functional transfer training;Gait training;Bed mobility; Equipment eval/education;Patient/family training;Spoke to nursing   Progress Progressing toward goals   PT Frequency 2-3x/wk   Recommendation   PT Discharge Recommendation Post acute rehabilitation services   Equipment Recommended Other (Comment)  (TBD)   AM-PAC Basic Mobility Inpatient   Turning in Bed Without Bedrails 2   Lying on Back to Sitting on Edge of Flat Bed 2   Moving Bed to Chair 3   Standing Up From Chair 3   Walk in Room 2   Climb 3-5 Stairs 2   Basic Mobility Inpatient Raw Score 14   Basic Mobility Standardized Score 35 55   Turning Head Towards Sound 4   Follow Simple Instructions 3   Low Function Basic Mobility Raw Score 21   Low Function Basic Mobility Standardized Score 34 39   Highest Level Of Mobility   -HLM Goal 4: Move to chair/commode   JH-HLM Achieved 5: Stand (1 or more minutes)   End of Consult   Patient Position at End of Consult Bedside chair;Bed/Chair alarm activated; All needs within reach       Ayaan Hernandez AM-PAC basic mobility standardized score less than 42 9 suggest the patient may benefit from discharge to post-acute rehab services

## 2022-10-11 NOTE — CASE MANAGEMENT
Case Management Discharge Planning Note    Patient name María Precise  [address]-* MRN [id number]  : 1945 Date 10/11/2022       Current Admission Date: 10/4/2022  Current Admission Diagnosis:Failure to thrive in adult   Patient Active Problem List    Diagnosis Date Noted   • Severe protein-calorie malnutrition ([facility] ) 10/09/2022   • Failure to thrive in adult 10/04/2022   • Seizures ([facility] ) 2021   • Stroke ([facility] ) 2021   • Essential hypertension 2021   • Hypothyroidism 2021   • Hyperlipidemia 2021   • Atrial fibrillation ([facility] ) 2021   • Chronic systolic heart failure ([facility] ) 2021   • Neurosarcoidosis 2021      LOS (days): 7  Geometric Mean LOS (GMLOS) (days): 3 10  Days to GMLOS:-3 9     OBJECTIVE:  Risk of Unplanned Readmission Score: 19 9         Current admission status: Inpatient   Preferred Pharmacy:   [facility]  # [id number]  Phone: [phone] Fax: [phone]    Primary Care Provider: Mejia Pope MD    Primary Insurance: [facility]  Secondary Insurance:     DISCHARGE DETAILS:    Additional Comments: [facility] can accept pt  TC to son and he is agreeble to [facility] will work on [place] and pt does not have to wait for second COVID shot

## 2022-10-11 NOTE — ASSESSMENT & PLAN NOTE
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, post hemorrhagic epilepsy, neurosarcoidosis, hypertension and AFib presents with failure to thrive  · After her hemorrhagic stroke she has right-sided residual weakness and she has been dysarthric, reviewing notes from Neurology in 2019 and admissions in May 2021  ·  08/05/2022 patient had an unwitnessed fall, she was found down by her son with a bleeding head wound  According to ED note she was not oriented to place person or time and her speech was dysarthric, but according to his son that was her baseline  · She was seen multiple times in the ED for various medical issues like vomiting, dizziness, fatigue  Seems like she had multiple issues associated with her chronic disabilities  On every ED visit she was in no distress, with mentation back Carlton Askew and waning again which is her baseline per son  She had total of 4 CT heads in the past 2 months which were all negative for acute pathology  · She presented to the ED this time with failure to thrive  Per son for the past few weeks she has not been eating and drinking well and she is just declining  Mostly bedbound    · CT head negative for acute processes  · Continue Seroquel 25 mg HS  · Pending placement for short-term rehab, may need hospice if patient continues to decline, family agreement with plan  · Today family at bedside, she is awake and alert, per family this is the best she has been in the past month  · Plan to discharge tomorrow to rehab

## 2022-10-11 NOTE — PLAN OF CARE
Slight improvement with po, but not meeting estimated needs, continue to encourage and assist at meals, continue supplements  Problem: Nutrition/Hydration-ADULT  Goal: Nutrient/Hydration intake appropriate for improving, restoring or maintaining nutritional needs  Description: Monitor and assess patient's nutrition/hydration status for malnutrition  Collaborate with interdisciplinary team and initiate plan and interventions as ordered  Monitor patient's weight and dietary intake as ordered or per policy  Utilize nutrition screening tool and intervene as necessary  Determine patient's food preferences and provide high-protein, high-caloric foods as appropriate       INTERVENTIONS:  - Monitor oral intake, urinary output, labs, and treatment plans  - Assess nutrition and hydration status and recommend course of action  - Evaluate amount of meals eaten  - Assist patient with eating if necessary   - Allow adequate time for meals  - Recommend/ encourage appropriate diets, oral nutritional supplements, and vitamin/mineral supplements  - Order, calculate, and assess calorie counts as needed  - Recommend, monitor, and adjust tube feedings and TPN/PPN based on assessed needs  - Assess need for intravenous fluids  - Provide specific nutrition/hydration education as appropriate  - Include patient/family/caregiver in decisions related to nutrition  Outcome: Progressing

## 2022-10-11 NOTE — PLAN OF CARE
Problem: Potential for Falls  Goal: Patient will remain free of falls  Description: INTERVENTIONS:  - Educate patient/family on patient safety including physical limitations  - Instruct patient to call for assistance with activity   - Consult OT/PT to assist with strengthening/mobility   - Keep Call bell within reach  - Keep bed low and locked with side rails adjusted as appropriate  - Keep care items and personal belongings within reach  - Initiate and maintain comfort rounds  - Make Fall Risk Sign visible to staff  - Offer Toileting every 2 Hours, in advance of need  - Initiate/Maintain bed/chair alarm  - Obtain necessary fall risk management equipment: alarms  - Apply yellow socks and bracelet for high fall risk patients  - Consider moving patient to room near nurses station  Outcome: Progressing     Problem: Nutrition/Hydration-ADULT  Goal: Nutrient/Hydration intake appropriate for improving, restoring or maintaining nutritional needs  Description: Monitor and assess patient's nutrition/hydration status for malnutrition  Collaborate with interdisciplinary team and initiate plan and interventions as ordered  Monitor patient's weight and dietary intake as ordered or per policy  Utilize nutrition screening tool and intervene as necessary  Determine patient's food preferences and provide high-protein, high-caloric foods as appropriate       INTERVENTIONS:  - Monitor oral intake, urinary output, labs, and treatment plans  - Assess nutrition and hydration status and recommend course of action  - Evaluate amount of meals eaten  - Assist patient with eating if necessary   - Allow adequate time for meals  - Recommend/ encourage appropriate diets, oral nutritional supplements, and vitamin/mineral supplements  - Order, calculate, and assess calorie counts as needed  - Recommend, monitor, and adjust tube feedings and TPN/PPN based on assessed needs  - Assess need for intravenous fluids  - Provide specific nutrition/hydration education as appropriate  - Include patient/family/caregiver in decisions related to nutrition  Outcome: Progressing     Problem: Prexisting or High Potential for Compromised Skin Integrity  Goal: Skin integrity is maintained or improved  Description: INTERVENTIONS:  - Identify patients at risk for skin breakdown  - Assess and monitor skin integrity  - Assess and monitor nutrition and hydration status  - Monitor labs   - Assess for incontinence   - Turn and reposition patient  - Assist with mobility/ambulation  - Relieve pressure over bony prominences  - Avoid friction and shearing  - Provide appropriate hygiene as needed including keeping skin clean and dry  - Evaluate need for skin moisturizer/barrier cream  - Collaborate with interdisciplinary team   - Patient/family teaching  - Consider wound care consult   Outcome: Progressing     Problem: MOBILITY - ADULT  Goal: Maintain or return to baseline ADL function  Description: INTERVENTIONS:  -  Assess patient's ability to carry out ADLs; assess patient's baseline for ADL function and identify physical deficits which impact ability to perform ADLs (bathing, care of mouth/teeth, toileting, grooming, dressing, etc )  - Assess/evaluate cause of self-care deficits   - Assess range of motion  - Assess patient's mobility; develop plan if impaired  - Assess patient's need for assistive devices and provide as appropriate  - Encourage maximum independence but intervene and supervise when necessary  - Involve family in performance of ADLs  - Assess for home care needs following discharge   - Consider OT consult to assist with ADL evaluation and planning for discharge  - Provide patient education as appropriate  Outcome: Progressing  Goal: Maintains/Returns to pre admission functional level  Description: INTERVENTIONS:  - Perform BMAT or MOVE assessment daily    - Set and communicate daily mobility goal to care team and patient/family/caregiver     - Collaborate with rehabilitation services on mobility goals if consulted  - Perform Range of Motion 3 times a day  - Reposition patient every 2 hours    - Dangle patient 3 times a day  - Stand patient 2 times a day  - Out of bed to chair 2 times a day   - Record patient progress and toleration of activity level   Outcome: Progressing     Problem: GENITOURINARY - ADULT  Goal: Maintains or returns to baseline urinary function  Description: INTERVENTIONS:  - Assess urinary function  - Encourage oral fluids to ensure adequate hydration if ordered  - Administer IV fluids as ordered to ensure adequate hydration  - Administer ordered medications as needed  - Offer frequent toileting  - Follow urinary retention protocol if ordered  Outcome: Progressing  Goal: Absence of urinary retention  Description: INTERVENTIONS:  - Assess patient’s ability to void and empty bladder  - Monitor I/O  - Bladder scan as needed  - Discuss with physician/AP medications to alleviate retention as needed  - Discuss catheterization for long term situations as appropriate  Outcome: Progressing     Problem: DISCHARGE PLANNING  Goal: Discharge to home or other facility with appropriate resources  Description: INTERVENTIONS:  - Identify barriers to discharge w/patient and caregiver  - Arrange for needed discharge resources and transportation as appropriate  - Identify discharge learning needs (meds, wound care, etc )  - Arrange for interpretive services to assist at discharge as needed  - Refer to Case Management Department for coordinating discharge planning if the patient needs post-hospital services based on physician/advanced practitioner order or complex needs related to functional status, cognitive ability, or social support system  Outcome: Progressing     Problem: Knowledge Deficit  Goal: Patient/family/caregiver demonstrates understanding of disease process, treatment plan, medications, and discharge instructions  Description: Complete learning assessment and assess knowledge base    Interventions:  - Provide teaching at level of understanding  - Provide teaching via preferred learning methods  Outcome: Progressing

## 2022-10-11 NOTE — PLAN OF CARE
Problem: PHYSICAL THERAPY ADULT  Goal: Performs mobility at highest level of function for planned discharge setting  See evaluation for individualized goals  Description: Treatment/Interventions: Functional transfer training, LE strengthening/ROM, Therapeutic exercise, Endurance training, Patient/family training, Bed mobility, Gait training, Spoke to nursing, OT, Spoke to case management  Equipment Recommended: Other (Comment) (monitor)       See flowsheet documentation for full assessment, interventions and recommendations  Outcome: Progressing  Note: Prognosis: Guarded  Problem List: Decreased strength, Decreased range of motion, Impaired balance, Decreased mobility, Impaired judgement, Decreased cognition, Decreased coordination, Decreased safety awareness, Impaired hearing  Assessment: Pt  progressing well with mobility  Pt  needed Mod A x 1 for supine to sit transfer and use of bedrails with HOB elevated  pt can hear better on the L side  pt  was talking today though most were not making sense  Repepated STS transfers from chair and maintained static standing with Mod A x 1 on the R and LUE on chair  LE weakness and instability noted  Pt  also able to stand for pericare  Pt  seated on chair with alarm on and all needs within reach  Will continue to follow per PT POC as patient functionaing below her baseline  Family reported patient was ambulating with RW prior to her last concussion two month ago  Barriers to Discharge: None     PT Discharge Recommendation: Post acute rehabilitation services    See flowsheet documentation for full assessment

## 2022-10-12 ENCOUNTER — TELEPHONE (OUTPATIENT)
Dept: OTHER | Facility: OTHER | Age: 77
End: 2022-10-12

## 2022-10-12 VITALS
HEART RATE: 59 BPM | RESPIRATION RATE: 18 BRPM | OXYGEN SATURATION: 97 % | DIASTOLIC BLOOD PRESSURE: 65 MMHG | SYSTOLIC BLOOD PRESSURE: 151 MMHG | TEMPERATURE: 97.2 F | WEIGHT: 140.87 LBS | BODY MASS INDEX: 29.44 KG/M2

## 2022-10-12 LAB
GLUCOSE SERPL-MCNC: 91 MG/DL (ref 65–140)
POTASSIUM SERPL-SCNC: 3.2 MMOL/L (ref 3.5–5.3)

## 2022-10-12 PROCEDURE — 97530 THERAPEUTIC ACTIVITIES: CPT

## 2022-10-12 PROCEDURE — 97110 THERAPEUTIC EXERCISES: CPT

## 2022-10-12 PROCEDURE — 92526 ORAL FUNCTION THERAPY: CPT

## 2022-10-12 PROCEDURE — 82948 REAGENT STRIP/BLOOD GLUCOSE: CPT

## 2022-10-12 PROCEDURE — 84132 ASSAY OF SERUM POTASSIUM: CPT | Performed by: INTERNAL MEDICINE

## 2022-10-12 PROCEDURE — 97116 GAIT TRAINING THERAPY: CPT

## 2022-10-12 PROCEDURE — 99239 HOSP IP/OBS DSCHRG MGMT >30: CPT | Performed by: INTERNAL MEDICINE

## 2022-10-12 RX ORDER — POTASSIUM CHLORIDE 20 MEQ/1
20 TABLET, EXTENDED RELEASE ORAL DAILY
Qty: 5 TABLET | Refills: 0
Start: 2022-10-12 | End: 2022-10-19 | Stop reason: ALTCHOICE

## 2022-10-12 RX ORDER — QUETIAPINE FUMARATE 25 MG/1
25 TABLET, FILM COATED ORAL
Qty: 30 TABLET | Refills: 0
Start: 2022-10-12 | End: 2022-11-03 | Stop reason: ALTCHOICE

## 2022-10-12 RX ORDER — POTASSIUM CHLORIDE 20 MEQ/1
40 TABLET, EXTENDED RELEASE ORAL ONCE
Status: COMPLETED | OUTPATIENT
Start: 2022-10-12 | End: 2022-10-12

## 2022-10-12 RX ORDER — AMLODIPINE BESYLATE 5 MG/1
5 TABLET ORAL DAILY
Qty: 30 TABLET | Refills: 0
Start: 2022-10-12 | End: 2022-11-11

## 2022-10-12 RX ADMIN — PHENYTOIN SODIUM 100 MG: 100 CAPSULE ORAL at 08:54

## 2022-10-12 RX ADMIN — ALLOPURINOL 100 MG: 100 TABLET ORAL at 08:52

## 2022-10-12 RX ADMIN — LAMOTRIGINE 250 MG: 25 TABLET ORAL at 17:11

## 2022-10-12 RX ADMIN — FAMOTIDINE 20 MG: 20 TABLET ORAL at 05:24

## 2022-10-12 RX ADMIN — ASPIRIN 81 MG: 81 TABLET, COATED ORAL at 08:52

## 2022-10-12 RX ADMIN — PREDNISONE 7.5 MG: 5 TABLET ORAL at 08:52

## 2022-10-12 RX ADMIN — ATORVASTATIN CALCIUM 10 MG: 10 TABLET, FILM COATED ORAL at 16:20

## 2022-10-12 RX ADMIN — LAMOTRIGINE 250 MG: 25 TABLET ORAL at 05:23

## 2022-10-12 RX ADMIN — LEVOTHYROXINE SODIUM 50 MCG: 50 TABLET ORAL at 05:24

## 2022-10-12 RX ADMIN — POTASSIUM CHLORIDE 40 MEQ: 1500 TABLET, EXTENDED RELEASE ORAL at 11:17

## 2022-10-12 NOTE — PLAN OF CARE
Problem: PHYSICAL THERAPY ADULT  Goal: Performs mobility at highest level of function for planned discharge setting  See evaluation for individualized goals  Description: Treatment/Interventions: Functional transfer training, LE strengthening/ROM, Therapeutic exercise, Endurance training, Patient/family training, Bed mobility, Gait training, Spoke to nursing, OT, Spoke to case management  Equipment Recommended: Other (Comment) (monitor)       See flowsheet documentation for full assessment, interventions and recommendations  Outcome: Progressing  Note: Prognosis: Fair  Problem List: Decreased strength, Decreased range of motion, Decreased endurance, Impaired balance, Decreased mobility, Decreased coordination, Decreased cognition, Impaired judgement, Impaired tone, Impaired sensation  Assessment: Pt  progressing well with mobility  pt  needed decreased assist for all mobility  Pt  able to follow instructions well needed few repetitions  pt  is [place] and need to talk on her L side  Pt  very pleasant and cooperative t/o session  pt  able to ambulate with A x 2 mainly A provided for holding RUE and to negotiate RW for ambualtion especially during turns  Pt  needed cueing for techniques and chair follow given for ambulation  LE instability and unsteadiness noted during ambulation however no knee buckling note t/o ambualtion  pt  seated on chair with all needs withinr each and RUE elavted on pillow and chair alarm engaged  pt  continues to make good mobility progress and will benefit from continued skilled PT  Will continue with PT POC  Barriers to Discharge: None     PT Discharge Recommendation: Post acute rehabilitation services    See flowsheet documentation for full assessment

## 2022-10-12 NOTE — ASSESSMENT & PLAN NOTE
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, post hemorrhagic epilepsy, neurosarcoidosis, hypertension and AFib presents with failure to thrive  · After her hemorrhagic stroke she has right-sided residual weakness and she has been dysarthric, reviewing notes from Neurology in 2019 and admissions in May 2021  ·  08/05/2022 patient had an unwitnessed fall, she was found down by her son with a bleeding head wound  According to ED note she was not oriented to place person or time and her speech was dysarthric, but according to his son that was her baseline  · She was seen multiple times in the ED for various medical issues like vomiting, dizziness, fatigue  Seems like she had multiple issues associated with her chronic disabilities  On every ED visit she was in no distress, with mentation waxing and waning, again which is her baseline per son  She had total of 4 CT heads in the past 2 months which were all negative for acute pathology  · She presented to the ED this time with failure to thrive  Per son for the past few weeks she has not been eating and drinking well and she is just declining  Mostly bedbound  · CT head negative for acute processes  · Patient has been more awake and alert for the past 2 days  · Taking her medications  · Continue Seroquel 25 mg HS  · Patient will be discharged to short-term rehab  · Just prior to discharge son reported that she has a scheduled MRI ordered by her primary care doctor in a few days  Patient will have the MRI

## 2022-10-12 NOTE — NURSING NOTE
Patient d/c to [facility]  Report given to Royce Dsouza   Supervisor and paperwork faxed as well as hard copy given to transport team

## 2022-10-12 NOTE — OCCUPATIONAL THERAPY NOTE
Occupational Therapy Progress Note     Patient Name: Yonatan Lovett  TTIWS'Q Date: 10/12/2022  Problem List  Principal Problem:    Failure to thrive in adult  Active Problems:    Seizures ([facility] )    Essential hypertension    Atrial fibrillation ([facility])    Chronic systolic heart failure ([facility] )    Neurosarcoidosis    Severe protein[facility]calorie malnutrition ([facility] )    Hypokalemia        10/12/22 1511   OT Last Visit   OT Visit Date 10/12/22   Note Type   Note Type Treatment   Pain Assessment   Pain Assessment Tool FLACC   Pain Score No Pain   Pain Rating: FLACC (Rest) - Face 0   Pain Rating: FLACC (Rest) - Legs 0   Pain Rating: FLACC (Rest) - Activity 0   Pain Rating: FLACC (Rest) - Cry 0   Pain Rating: FLACC (Rest) - Consolability 0   Score: FLACC (Rest) 0   Pain Rating: FLACC (Activity) - Face 0   Pain Rating: FLACC (Activity) - Legs 0   Pain Rating: FLACC (Activity) - Activity 0   Pain Rating: FLACC (Activity) - Cry 0   Pain Rating: FLACC (Activity) - Consolability 0   Score: FLACC (Activity) 0   Restrictions/Precautions   Weight Bearing Precautions Per Order No   Other Precautions Cognitive; Chair Alarm; Bed Alarm; Fall Risk   Functional Standing Tolerance   Time 1 min at a time   Activity static standing with RW for support   Comments demonstrated fair- balance with Sea   Transfers   Sit to Stand 4  Minimal assistance   Additional items Assist x 1; Increased time required   Stand to Sit 4  Minimal assistance   Additional items Assist x 1   Stand pivot 4  Minimal assistance   Additional items Assist x 2; Increased time required   Functional Mobility   Functional Mobility 3  Moderate assistance   Additional Comments Assist x 2 - one to manage RUE and assist in mainatining balance and second person to navigate RW  Additional items Rolling walker   Assessment   Assessment Pt seen for skilled OT tx this date with focus on transfer training, balance training,overall strengthening and safety/ fall prevention   Pt completed functional transfers from EOB and from bedside chair with Sea x 1-2  Stand pivot transfers EOB<> chair minAx 1-2  Pt completed functional mob with RW and modA x 2 - one person to manage RUE and second to assist with navigating RW  Pt amb short functional distances x 3  Pt tolerated session well with no complaints  Pt noted with increased active participation and motivation this date  Following session, pt left seated up in chair with call light in reach  OT DC recommendation: post acute rehab  Plan   Treatment Interventions ADL retraining;Functional transfer training;Patient/family training   Goal Expiration Date 10/19/22   OT Treatment Day 3   OT Frequency 2-3x/wk   Recommendation   OT Discharge Recommendation Post acute rehabilitation services   Additional Comments  The patient's raw score on the AM-PAC Daily Activity inpatient short form is 10, standardized score is  , less than 39 4  Patients at this level are likely to benefit from discharge to post-acute rehabilitation services  Please refer to the recommendation of the Occupational Therapist for safe discharge planning     AM-PAC Daily Activity Inpatient   Lower Body Dressing 1   Bathing 1   Toileting 1   Upper Body Dressing 2   Grooming 2   Eating 3   Daily Activity Raw Score 10   AM-PAC Applied Cognition Inpatient   Following a Speech/Presentation 2   Understanding Ordinary Conversation 2   Taking Medications 1   Remembering Where Things Are Placed or Put Away 1   Remembering List of 4-5 Errands 1   Taking Care of Complicated Tasks 1   Applied Cognition Raw Score 8   Applied Cognition Standardized Score 19 32   Nicole Valverde, OT

## 2022-10-12 NOTE — PLAN OF CARE
Problem: Potential for Falls  Goal: Patient will remain free of falls  Description: INTERVENTIONS:  - Educate patient/family on patient safety including physical limitations  - Instruct patient to call for assistance with activity   - Consult OT/PT to assist with strengthening/mobility   - Keep Call bell within reach  - Keep bed low and locked with side rails adjusted as appropriate  - Keep care items and personal belongings within reach  - Initiate and maintain comfort rounds  - Make Fall Risk Sign visible to staff  - Offer Toileting every 2 Hours, in advance of need  - Initiate/Maintain bed/chair alarm  - Obtain necessary fall risk management equipment: alarms  - Apply yellow socks and bracelet for high fall risk patients  - Consider moving patient to room near nurses station  Outcome: Progressing     Problem: Nutrition/Hydration-ADULT  Goal: Nutrient/Hydration intake appropriate for improving, restoring or maintaining nutritional needs  Description: Monitor and assess patient's nutrition/hydration status for malnutrition  Collaborate with interdisciplinary team and initiate plan and interventions as ordered  Monitor patient's weight and dietary intake as ordered or per policy  Utilize nutrition screening tool and intervene as necessary  Determine patient's food preferences and provide high-protein, high-caloric foods as appropriate       INTERVENTIONS:  - Monitor oral intake, urinary output, labs, and treatment plans  - Assess nutrition and hydration status and recommend course of action  - Evaluate amount of meals eaten  - Assist patient with eating if necessary   - Allow adequate time for meals  - Recommend/ encourage appropriate diets, oral nutritional supplements, and vitamin/mineral supplements  - Order, calculate, and assess calorie counts as needed  - Recommend, monitor, and adjust tube feedings and TPN/PPN based on assessed needs  - Assess need for intravenous fluids  - Provide specific nutrition/hydration education as appropriate  - Include patient/family/caregiver in decisions related to nutrition  Outcome: Progressing     Problem: Prexisting or High Potential for Compromised Skin Integrity  Goal: Skin integrity is maintained or improved  Description: INTERVENTIONS:  - Identify patients at risk for skin breakdown  - Assess and monitor skin integrity  - Assess and monitor nutrition and hydration status  - Monitor labs   - Assess for incontinence   - Turn and reposition patient  - Assist with mobility/ambulation  - Relieve pressure over bony prominences  - Avoid friction and shearing  - Provide appropriate hygiene as needed including keeping skin clean and dry  - Evaluate need for skin moisturizer/barrier cream  - Collaborate with interdisciplinary team   - Patient/family teaching  - Consider wound care consult   Outcome: Progressing     Problem: MOBILITY - ADULT  Goal: Maintain or return to baseline ADL function  Description: INTERVENTIONS:  -  Assess patient's ability to carry out ADLs; assess patient's baseline for ADL function and identify physical deficits which impact ability to perform ADLs (bathing, care of mouth/teeth, toileting, grooming, dressing, etc )  - Assess/evaluate cause of self-care deficits   - Assess range of motion  - Assess patient's mobility; develop plan if impaired  - Assess patient's need for assistive devices and provide as appropriate  - Encourage maximum independence but intervene and supervise when necessary  - Involve family in performance of ADLs  - Assess for home care needs following discharge   - Consider OT consult to assist with ADL evaluation and planning for discharge  - Provide patient education as appropriate  Outcome: Progressing  Goal: Maintains/Returns to pre admission functional level  Description: INTERVENTIONS:  - Perform BMAT or MOVE assessment daily    - Set and communicate daily mobility goal to care team and patient/family/caregiver     - Collaborate with rehabilitation services on mobility goals if consulted  - Perform Range of Motion 3 times a day  - Reposition patient every 2 hours    - Dangle patient 3 times a day  - Stand patient 3 times a day  - Record patient progress and toleration of activity level   Outcome: Progressing     Problem: GENITOURINARY - ADULT  Goal: Maintains or returns to baseline urinary function  Description: INTERVENTIONS:  - Assess urinary function  - Encourage oral fluids to ensure adequate hydration if ordered  - Administer IV fluids as ordered to ensure adequate hydration  - Administer ordered medications as needed  - Offer frequent toileting  - Follow urinary retention protocol if ordered  Outcome: Progressing  Goal: Absence of urinary retention  Description: INTERVENTIONS:  - Assess patient’s ability to void and empty bladder  - Monitor I/O  - Bladder scan as needed  - Discuss with physician/AP medications to alleviate retention as needed  - Discuss catheterization for long term situations as appropriate  Outcome: Progressing     Problem: DISCHARGE PLANNING  Goal: Discharge to home or other facility with appropriate resources  Description: INTERVENTIONS:  - Identify barriers to discharge w/patient and caregiver  - Arrange for needed discharge resources and transportation as appropriate  - Identify discharge learning needs (meds, wound care, etc )  - Arrange for interpretive services to assist at discharge as needed  - Refer to Case Management Department for coordinating discharge planning if the patient needs post-hospital services based on physician/advanced practitioner order or complex needs related to functional status, cognitive ability, or social support system  Outcome: Progressing     Problem: Knowledge Deficit  Goal: Patient/family/caregiver demonstrates understanding of disease process, treatment plan, medications, and discharge instructions  Description: Complete learning assessment and assess knowledge base   Interventions:  - Provide teaching at level of understanding  - Provide teaching via preferred learning methods  Outcome: Progressing

## 2022-10-12 NOTE — SPEECH THERAPY NOTE
Speech Language/Pathology    Speech/Language Pathology Progress Note    Patient Name: Rain Du  RNYUG'Y Date: 10/12/2022     Problem List  Principal Problem:    Failure to thrive in adult  Active Problems:    Seizures ([address] )    Essential hypertension    Atrial fibrillation ([address] )    Chronic systolic heart failure ([address] )    Neurosarcoidosis    Severe protein-calorie malnutrition ([address] )    Hypokalemia       Past Medical History  Past Medical History:   Diagnosis Date   • Disease of thyroid gland    • Hypertension    • Sarcoidosis    • Seizure ([facility] )    • Stroke [facility])         Past Surgical History  Past Surgical History:   Procedure Laterality Date   • APPENDECTOMY     • BRAIN SURGERY     • EYE SURGERY     • HYSTERECTOMY           Subjective:  Pt seen for dysphagia tx  Pt alert, sitting upright in bed  Objective:  Reviewed chart, spoke with RN and PCA  Pt has been reportedly tolerating her diet (dysphagia 2 and thin liquids) but eating very little  Pt frequently talking during session, but speech is halting, dysarthric, mostly unintelligible  Pt was fed a portion of breakfast, which was all pureed  Pt ate only 1-2 bites of each item and then refused further PO; bolus manipulation and transfer appeared prompt, pharyngeal swallows appeared prompt, and there were no overt s/s of aspiration  She drank thin liquids via straw, with no s/s of aspiration  She ate only one bite of a soft solid (bite of nutrigrain bar with fruit in the middle), with very prolonged and perseverative mastication  She declined to try a germania cracker  Assessment:  Very prolonged and inefficient mastication with soft solid (pt took only one bite), pt not appropriate for diet upgrade at this time  Pt ate pureed/thin liquids without difficulty, no s/s of aspiration  Staff reports pt tolerating dysphagia 2 foods, but minimal PO intake; did not observe pt eat dysphagia 2 level foods today       Plan/Recommendations:  Continue dysphagia 2 diet/thin liquids  Recommend ST attempt to see with a larger amount and variety of dysphagia 2 foods to ensure diet toleration

## 2022-10-12 NOTE — ASSESSMENT & PLAN NOTE
Malnutrition Findings:   Adult Malnutrition type: Chronic illness  Adult Degree of Malnutrition: Other severe protein calorie malnutrition  Malnutrition Characteristics: Inadequate energy, Weight loss                  360 Statement: in setting of Failure to Thrive, poor po intake over the last several months, <75% energy intake compared to estimated needs for > 1 mo, wt loss (*8/5/2022 167 5 lbs - *10/6/22 141 lbs  *16% wt loss x 2 mo), treated with diet and supplements as tolerated    BMI Findings: Body mass index is 29 44 kg/m²

## 2022-10-12 NOTE — PLAN OF CARE
Problem: OCCUPATIONAL THERAPY ADULT  Goal: Performs self-care activities at highest level of function for planned discharge setting  See evaluation for individualized goals  Description: Treatment Interventions: ADL retraining, Functional transfer training, UE strengthening/ROM, Endurance training, Patient/family training, Equipment evaluation/education, Compensatory technique education, Continued evaluation, Activityengagement          See flowsheet documentation for full assessment, interventions and recommendations  Outcome: Progressing  Note: Limitation: Decreased ADL status, Decreased UE strength, Decreased UE ROM, Decreased Safe judgement during ADL, Decreased cognition, Decreased endurance, Decreased self-care trans, Decreased high-level ADLs, Decreased fine motor control, Non-func R UE  Prognosis: Poor  Assessment: Pt seen for skilled OT tx this date with focus on transfer training, balance training,overall strengthening and safety/ fall prevention  Pt completed functional transfers from EOB and from bedside chair with Sea x 1-2  Stand pivot transfers EOB<> chair minAx 1-2  Pt completed functional mob with RW and modA x 2 - one person to manage RUE and second to assist with navigating RW  Pt amb short functional distances x 3  Pt tolerated session well with no complaints  Pt noted with increased active participation and motivation this date  Following session, pt left seated up in chair with call light in reach  OT DC recommendation: post acute rehab       OT Discharge Recommendation: Post acute rehabilitation services

## 2022-10-12 NOTE — CASE MANAGEMENT
Case Management Discharge Planning Note    Patient name John Garcia  Location [address] [place] [address] [address]-* MRN [id number]  : 1945 Date 10/12/2022       Current Admission Date: 10/4/2022  Current Admission Diagnosis:Failure to thrive in adult   Patient Active Problem List    Diagnosis Date Noted   • Hypokalemia 10/11/2022   • Severe protein-calorie malnutrition ([facility] [address] ) 10/09/2022   • Failure to thrive in adult 10/04/2022   • Seizures ([facility] [address] ) 2021   • Stroke ([facility] [facility] ) 2021   • Essential hypertension 2021   • Hypothyroidism 2021   • Hyperlipidemia 2021   • Atrial fibrillation ([facility] [facility] ) 2021   • Chronic systolic heart failure ([facility] [facility] ) 2021   • Neurosarcoidosis 2021      LOS (days): 8  Geometric Mean LOS (GMLOS) (days): 3 10  Days to GMLOS:-5     OBJECTIVE:  Risk of Unplanned Readmission Score: 22 48         Current admission status: Inpatient   Preferred Pharmacy:   [facility]  [address] # [address]  Phone: [phone] Fax: [phone]    Primary Care Provider: Gen Dyer MD    Primary Insurance: [facility]  Secondary Insurance:     DISCHARGE DETAILS:          Comments - Freedom of Choice: Son renae to [facility]        Transport at Discharge : [facility]  Dispatcher Contacted: Yes        ETA of Transport (Date): 10/12/22                 IMM Given (Date):: 10/12/22  IMM Given to[de-identified] Family  Family notified[de-identified] Read IMM, copy given and copy put in bin to be scan  Additional Comments: [facility] accepted pt and rec auth today  MD is planning on discharaging today  Made referral for BLS and Med Nec completed and on the chart   [facility] will be able to transport to/from [facility] on Friday 10/12/2022 at 1200 to [place] [address] : [facility]  Receiving Facility/Agency Phone Number: [phone]  Facility/Agency Fax Number: [phone]

## 2022-10-12 NOTE — PHYSICAL THERAPY NOTE
Physical Therapy Treatment Note     10/12/22 1531   PT Last Visit   PT Visit Date 10/12/22   Note Type   Note Type Treatment   Pain Assessment   Pain Assessment Tool FLACC   Pain Score No Pain   Restrictions/Precautions   Weight Bearing Precautions Per Order No   Other Precautions Chair Alarm; Fall Risk;Hard of hearing;Cognitive  (R UE flaccidity)   General   Chart Reviewed Yes   Subjective   Subjective Pt  in bed upon entry  Agreeable to PT   Bed Mobility   Supine to Sit 4  Minimal assistance   Additional items Assist x 1;HOB elevated; Bedrails; Increased time required;Verbal cues;LE management   Transfers   Sit to Stand 4  Minimal assistance   Additional items Assist x 1; Increased time required;Verbal cues;Armrests   Stand to Sit 4  Minimal assistance   Additional items Assist x 1; Increased time required;Verbal cues;Armrests   Ambulation/Elevation   Gait pattern Improper Weight shift; Forward Flexion;Decreased foot clearance; Inconsistent otto;Poor UE support;R Hemiparesis; Excessively slow;Decreased toe off;Decreased heel strike; Short stride  (Unsteady gait)   Gait Assistance 3  Moderate assist   Additional items Assist x 2;Verbal cues   Assistive Device Rolling walker  (used only the L side)   Distance 8steps, 12ft, 20ft   Balance   Static Sitting Fair +   Dynamic Sitting Fair   Static Standing Fair   Dynamic Standing Fair -   Ambulatory Fair -   Activity Tolerance   Activity Tolerance Patient tolerated treatment well   Nurse Made Aware Yes   Exercises   THR Supine;Bilateral;AROM;AAROM;20 reps   Assessment   Prognosis Fair   Problem List Decreased strength;Decreased range of motion;Decreased endurance; Impaired balance;Decreased mobility; Decreased coordination;Decreased cognition; Impaired judgement; Impaired tone; Impaired sensation   Assessment Pt  progressing well with mobility  pt  needed decreased assist for all mobility  Pt  able to follow instructions well needed few repetitions   pt  is [place] and need to talk on her L side  Pt  very pleasant and cooperative t/o session  pt  able to ambulate with A x 2 mainly A provided for holding RUE and to negotiate RW for ambualtion especially during turns  Pt  needed cueing for techniques and chair follow given for ambulation  LE instability and unsteadiness noted during ambulation however no knee buckling note t/o ambualtion  pt  seated on chair with all needs withinr each and RUE elavted on pillow and chair alarm engaged  pt  continues to make good mobility progress and will benefit from continued skilled PT  Will continue with PT POC  Barriers to Discharge None   Goals   Patient Goals None reported   STG Expiration Date 10/19/22   PT Treatment Day 4   Plan   Treatment/Interventions Functional transfer training;LE strengthening/ROM; Therapeutic exercise;Gait training;Bed mobility; Equipment eval/education;Patient/family training;Spoke to nursing;OT   Progress Progressing toward goals   PT Frequency 2-3x/wk   Recommendation   PT Discharge Recommendation Post acute rehabilitation services   AM-PAC Basic Mobility Inpatient   Turning in Bed Without Bedrails 2   Lying on Back to Sitting on Edge of Flat Bed 2   Moving Bed to Chair 3   Standing Up From Chair 3   Walk in Room 2   Climb 3-5 Stairs 2   Basic Mobility Inpatient Raw Score 14   Basic Mobility Standardized Score 35 55   Turning Head Towards Sound 4   Follow Simple Instructions 4   Low Function Basic Mobility Raw Score 22   Low Function Basic Mobility Standardized Score 35 85   Highest Level Of Mobility   JH-HLM Goal 4: Move to chair/commode   JH-HLM Achieved 7: Walk 25 feet or more         Brad Hernandez AM-PAC basic mobility standardized score less than 42 9 suggest the patient may benefit from discharge to post-acute rehab services

## 2022-10-12 NOTE — CASE MANAGEMENT
Case Management Discharge Planning Note    Patient name Kristen Ferreira  Location [address] [address]-* MRN [id number]  : 1945 Date 10/12/2022       Current Admission Date: 10/4/2022  Current Admission Diagnosis:Failure to thrive in adult   Patient Active Problem List    Diagnosis Date Noted   • Hypokalemia 10/11/2022   • Severe protein-calorie malnutrition ([facility] [facility] ) 10/09/2022   • Failure to thrive in adult 10/04/2022   • Seizures ([facility] [address] ) 2021   • [facility] 2021   • Essential hypertension 2021   • Hypothyroidism 2021   • Hyperlipidemia 2021   • Atrial fibrillation ([facility] [facility] ) 2021   • Chronic systolic heart failure ([facility] [facility] ) 2021   • Neurosarcoidosis 2021      LOS (days): 8  Geometric Mean LOS (GMLOS) (days): 3 10  Days to GMLOS:-5     OBJECTIVE:  Risk of Unplanned Readmission Score: 22 48         Current admission status: Inpatient   Preferred Pharmacy:   [facility]  [place] [place]  # [address]  Phone: [phone] Fax: [phone]    Primary Care Provider: Angel Shin MD    Primary Insurance: [facility]  Secondary Insurance:     DISCHARGE DETAILS:          Comments - Freedom of Choice: Son agreeabel to [facility]             Transport at Discharge : [facility]  Dispatcher Contacted: Yes     Transported by [employer] and Unit #): [place]  ETA of Transport (Date): 10/12/22  ETA of Transport (Time): 1800              IMM Given (Date):: 10/12/22  IMM Given to[de-identified] Family  Family notified[de-identified] Read IMM, copy given and copy put in bin to be scan  Additional Comments: [place] [facility] will transport at 1800  MD, RN, SNF and son aware of  time      Accepting Facility Name, [address] : [facility]  Receiving Facility/Agency Phone Number: [phone]  Facility/Agency Fax Number: [phone]

## 2022-10-12 NOTE — PLAN OF CARE
Problem: Potential for Falls  Goal: Patient will remain free of falls  Description: INTERVENTIONS:  - Educate patient/family on patient safety including physical limitations  - Instruct patient to call for assistance with activity   - Consult OT/PT to assist with strengthening/mobility   - Keep Call bell within reach  - Keep bed low and locked with side rails adjusted as appropriate  - Keep care items and personal belongings within reach  - Initiate and maintain comfort rounds  - Make Fall Risk Sign visible to staff  - Offer Toileting every 2 Hours, in advance of need  - Initiate/Maintain bed alarm  - Apply yellow socks and bracelet for high fall risk patients  - Consider moving patient to room near nurses station  Outcome: Progressing     Problem: Nutrition/Hydration-ADULT  Goal: Nutrient/Hydration intake appropriate for improving, restoring or maintaining nutritional needs  Description: Monitor and assess patient's nutrition/hydration status for malnutrition  Collaborate with interdisciplinary team and initiate plan and interventions as ordered  Monitor patient's weight and dietary intake as ordered or per policy  Utilize nutrition screening tool and intervene as necessary  Determine patient's food preferences and provide high-protein, high-caloric foods as appropriate       INTERVENTIONS:  - Monitor oral intake, urinary output, labs, and treatment plans  - Assess nutrition and hydration status and recommend course of action  - Evaluate amount of meals eaten  - Assist patient with eating if necessary   - Allow adequate time for meals  - Recommend/ encourage appropriate diets, oral nutritional supplements, and vitamin/mineral supplements  - Order, calculate, and assess calorie counts as needed  - Recommend, monitor, and adjust tube feedings and TPN/PPN based on assessed needs  - Assess need for intravenous fluids  - Provide specific nutrition/hydration education as appropriate  - Include patient/family/caregiver in decisions related to nutrition  Outcome: Progressing     Problem: Prexisting or High Potential for Compromised Skin Integrity  Goal: Skin integrity is maintained or improved  Description: INTERVENTIONS:  - Identify patients at risk for skin breakdown  - Assess and monitor skin integrity  - Assess and monitor nutrition and hydration status  - Monitor labs   - Assess for incontinence   - Turn and reposition patient  - Assist with mobility/ambulation  - Relieve pressure over bony prominences  - Avoid friction and shearing  - Provide appropriate hygiene as needed including keeping skin clean and dry  - Evaluate need for skin moisturizer/barrier cream  - Collaborate with interdisciplinary team   - Patient/family teaching  - Consider wound care consult   Outcome: Progressing     Problem: MOBILITY - ADULT  Goal: Maintain or return to baseline ADL function  Description: INTERVENTIONS:  -  Assess patient's ability to carry out ADLs; assess patient's baseline for ADL function and identify physical deficits which impact ability to perform ADLs (bathing, care of mouth/teeth, toileting, grooming, dressing, etc )  - Assess/evaluate cause of self-care deficits   - Assess range of motion  - Assess patient's mobility; develop plan if impaired  - Assess patient's need for assistive devices and provide as appropriate  - Encourage maximum independence but intervene and supervise when necessary  - Involve family in performance of ADLs  - Assess for home care needs following discharge   - Consider OT consult to assist with ADL evaluation and planning for discharge  - Provide patient education as appropriate  Outcome: Progressing  Goal: Maintains/Returns to pre admission functional level  Description: INTERVENTIONS:  - Perform BMAT or MOVE assessment daily    - Set and communicate daily mobility goal to care team and patient/family/caregiver     - Collaborate with rehabilitation services on mobility goals if consulted  - Out of bed for toileting  - Record patient progress and toleration of activity level   Outcome: Progressing     Problem: GENITOURINARY - ADULT  Goal: Maintains or returns to baseline urinary function  Description: INTERVENTIONS:  - Assess urinary function  - Encourage oral fluids to ensure adequate hydration if ordered  - Administer IV fluids as ordered to ensure adequate hydration  - Administer ordered medications as needed  - Offer frequent toileting  - Follow urinary retention protocol if ordered  Outcome: Progressing  Goal: Absence of urinary retention  Description: INTERVENTIONS:  - Assess patient’s ability to void and empty bladder  - Monitor I/O  - Bladder scan as needed  - Discuss with physician/AP medications to alleviate retention as needed  - Discuss catheterization for long term situations as appropriate  Outcome: Progressing     Problem: DISCHARGE PLANNING  Goal: Discharge to home or other facility with appropriate resources  Description: INTERVENTIONS:  - Identify barriers to discharge w/patient and caregiver  - Arrange for needed discharge resources and transportation as appropriate  - Identify discharge learning needs (meds, wound care, etc )  - Arrange for interpretive services to assist at discharge as needed  - Refer to Case Management Department for coordinating discharge planning if the patient needs post-hospital services based on physician/advanced practitioner order or complex needs related to functional status, cognitive ability, or social support system  Outcome: Progressing     Problem: Knowledge Deficit  Goal: Patient/family/caregiver demonstrates understanding of disease process, treatment plan, medications, and discharge instructions  Description: Complete learning assessment and assess knowledge base    Interventions:  - Provide teaching at level of understanding  - Provide teaching via preferred learning methods  Outcome: Progressing

## 2022-10-12 NOTE — DISCHARGE SUMMARY
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, post hemorrhagic epilepsy, neurosarcoidosis, hypertension and AFib presents with failure to thrive  · After her hemorrhagic stroke she has right-sided residual weakness and she has been dysarthric, reviewing notes from Neurology in 2019 and admissions in May 2021  ·  08/05/2022 patient had an unwitnessed fall, she was found down by her son with a bleeding head wound  According to ED note she was not oriented to place person or time and her speech was dysarthric, but according to his son that was her baseline  · She was seen multiple times in the ED for various medical issues like vomiting, dizziness, fatigue  Seems like she had multiple issues associated with her chronic disabilities  On every ED visit she was in no distress, with mentation waxing and waning, again which is her baseline per son  She had total of 4 CT heads in the past 2 months which were all negative for acute pathology  · She presented to the ED this time with failure to thrive  Per son for the past few weeks she has not been eating and drinking well and she is just declining  Mostly bedbound  · CT head negative for acute processes  · Patient has been more awake and alert for the past 2 days  · Taking her medications  · Continue Seroquel 25 mg HS  · Patient will be discharged to short-term rehab  · Just prior to discharge son reported that she has a scheduled MRI ordered by her primary care doctor in a few days    Patient will have the MRI  Hypokalemia  Assessment & Plan  · Potassium 3 2, 40 mEq of potassium chloride given today  · Continue potassium chloride 20 mEq daily for 5 days  · Recommend rechecking labs in 1 week    Severe protein-calorie malnutrition ([facility] [address] )  Assessment & Plan  Malnutrition Findings:   Adult Malnutrition type: Chronic illness  Adult Degree of Malnutrition: Other severe protein calorie malnutrition  Malnutrition Characteristics: Inadequate energy, Weight loss                  360 Statement: in setting of Failure to Thrive, poor po intake over the last several months, <75% energy intake compared to estimated needs for > 1 mo, wt loss (*8/5/2022 167 5 lbs - *10/6/22 141 lbs  *16% wt loss x 2 mo), treated with diet and supplements as tolerated    BMI Findings: Body mass index is 29 44 kg/m²         Neurosarcoidosis  Assessment & Plan  · Continue home prednisone dose    Chronic systolic heart failure (HCC)  Assessment & Plan  Wt Readings from Last 3 Encounters:   10/12/22 63 9 kg (140 lb 14 oz)   10/12/22 63 9 kg (140 lb 14 oz)   08/31/22 64 8 kg (142 lb 13 7 oz)     · Echo from 2019 showed ejection fraction from 35-40%  · At baseline she is on Lasix 40 mg daily  · Continue to hold diuretics, until appetite improves  · Continue daily weight, currently her weight is below baseline    Atrial fibrillation (HCC)  Assessment & Plan  · EKG showing Afib  · Not on anticoagulation  · Rate control not on AV shelly blockers    Essential hypertension  Assessment & Plan  · Continue amlodipine 5 mg daily, hold for systolic blood pressure below 130  · Continue to hold losartan and Lasix, restart if indicated      Seizures ([facility] [address] )  Assessment & Plan  · No recent seizures  · Continue lamictal and phenytoin      Medical Problems             Resolved Problems  Date Reviewed: 10/12/2022   None               Discharging Physician / Practitioner: Cristal Noriega  PCP: Harpreet Arzola MD  Admission Date:   Admission Orders (From admission, onward)     Ordered        10/04/22 [address] [place] [address]  Once                      Discharge Date: 10/12/22    Consultations During Hospital Stay:  · None    Procedures Performed:   · None    Significant Findings / Test Results:   · 10/04/2022 CT head without contrast  IMPRESSION:  No evidence of acute intracranial process or significant interval change  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  · 10/04/2022 chest x-ray  IMPRESSION:  Lungs clear  Trace right effusion, similar to the abdomen CT from August 2022  Incidental Findings:   · none    Test Results Pending at Discharge (will require follow up):   · none     Outpatient Tests Requested:  · none    Complications:  none    Reason for Admission:  Failure to thrive    Hospital Course:   Adriana Russ is a 76 y o  female patient who originally presented to the hospital on 10/4/2022 due to failure to thrive  Patient has a complex medical history of chronic meningitis that begun in early 2000s requiring repeat yes placement, left frontal ICH thought to be secondary to Coumadin 2013 with residual right-sided weakness and dysarthria, post hemorrhagic epilepsy, neurosarcoidosis, hypertension atrial fibrillation  About 2 months ago patient had an up witness fall that she was found down by her son  She was seen multiple times in the ED since then for multiple complaints, CT head was negative for acute pathology every time  Seems like per son for the past few weeks she has been declining, not eating or drinking well, mostly bedbound  CT head on this admission was negative  Patient was started on Seroquel 25 mg HS  Discontinue Ativan  Patient has been more awake and alert for the past 2 days, taking her medications  Still with poor oral intake but significantly improved  Plan to discharge patient to rehab, from medical   Patient's history of systolic heart failure, Lasix has been on hold due to poor p o  intake    Please restart when her oral intake improves  Continue amlodipine 5 mg daily, hold with systolic blood pressure below 130  Continue to hold losartan  Ativan was discontinued  Please see above list of diagnoses and related plan for additional information  Condition at Discharge: poor    Discharge Day Visit / Exam:   Subjective:  Patient was seen evaluated bedside, she is awake and alert, dysarthric  Vitals: Blood Pressure: 151/65 (10/12/22 1500)  Pulse: 59 (10/12/22 1500)  Temperature: (!) 97 2 °F (36 2 °C) (10/12/22 1500)  Temp Source: Temporal (10/12/22 1500)  Respirations: 18 (10/12/22 1500)  Weight - Scale: 63 9 kg (140 lb 14 oz) (10/12/22 0600)  SpO2: 97 % (10/12/22 1500)  Exam:   Physical Exam  Constitutional:       General: She is not in acute distress  Comments: Appears chronically sick   HENT:      Head: Atraumatic  Cardiovascular:      Rate and Rhythm: Normal rate and regular rhythm  Heart sounds: No murmur heard  No friction rub  No gallop  Pulmonary:      Effort: Pulmonary effort is normal  No respiratory distress  Breath sounds: Normal breath sounds  No wheezing  Abdominal:      General: Bowel sounds are normal  There is no distension  Palpations: Abdomen is soft  Tenderness: There is no abdominal tenderness  Musculoskeletal:         General: No swelling  Cervical back: Neck supple  Skin:     General: Skin is warm and dry  Neurological:      Mental Status: She is alert  Comments: Right-sided hemiparesis, dysarthria   Psychiatric:         Mood and Affect: Mood normal          Discussion with Family: Updated  (son) at bedside  Discharge instructions/Information to patient and family:   See after visit summary for information provided to patient and family  Provisions for Follow-Up Care:  See after visit summary for information related to follow-up care and any pertinent home health orders         Disposition:   [facility] at [address] Readmission: no     Discharge Statement:  I spent 40 minutes discharging the patient  This time was spent on the day of discharge  I had direct contact with the patient on the day of discharge  Greater than 50% of the total time was spent examining patient, answering all patient questions, arranging and discussing plan of care with patient as well as directly providing post-discharge instructions  Additional time then spent on discharge activities  Discharge Medications:  See after visit summary for reconciled discharge medications provided to patient and/or family        **Please Note: This note may have been constructed using a voice recognition system**

## 2022-10-12 NOTE — ASSESSMENT & PLAN NOTE
Wt Readings from Last 3 Encounters:   10/12/22 63 9 kg (140 lb 14 oz)   10/12/22 63 9 kg (140 lb 14 oz)   08/31/22 64 8 kg (142 lb 13 7 oz)     · Echo from 2019 showed ejection fraction from 35-40%  · At baseline she is on Lasix 40 mg daily  · Continue to hold diuretics, until appetite improves  · Continue daily weight, currently her weight is below baseline

## 2022-10-12 NOTE — ASSESSMENT & PLAN NOTE
· Potassium 3 2, 40 mEq of potassium chloride given today  · Continue potassium chloride 20 mEq daily for 5 days  · Recommend rechecking labs in 1 week

## 2022-10-13 ENCOUNTER — NURSING HOME VISIT (OUTPATIENT)
Dept: GERIATRICS | Facility: OTHER | Age: 77
End: 2022-10-13
Payer: COMMERCIAL

## 2022-10-13 DIAGNOSIS — R62.7 FAILURE TO THRIVE IN ADULT: Primary | ICD-10-CM

## 2022-10-13 DIAGNOSIS — N18.32 STAGE 3B CHRONIC KIDNEY DISEASE (HCC): ICD-10-CM

## 2022-10-13 DIAGNOSIS — I10 ESSENTIAL HYPERTENSION: ICD-10-CM

## 2022-10-13 DIAGNOSIS — I50.22 CHRONIC SYSTOLIC HEART FAILURE (HCC): ICD-10-CM

## 2022-10-13 DIAGNOSIS — R56.9 SEIZURES (HCC): ICD-10-CM

## 2022-10-13 DIAGNOSIS — E87.6 HYPOKALEMIA: ICD-10-CM

## 2022-10-13 DIAGNOSIS — E03.9 HYPOTHYROIDISM, UNSPECIFIED TYPE: ICD-10-CM

## 2022-10-13 DIAGNOSIS — D86.89 NEUROSARCOIDOSIS: ICD-10-CM

## 2022-10-13 DIAGNOSIS — K21.9 GASTROESOPHAGEAL REFLUX DISEASE, UNSPECIFIED WHETHER ESOPHAGITIS PRESENT: ICD-10-CM

## 2022-10-13 DIAGNOSIS — I48.0 PAROXYSMAL ATRIAL FIBRILLATION (HCC): ICD-10-CM

## 2022-10-13 DIAGNOSIS — R26.2 AMBULATORY DYSFUNCTION: ICD-10-CM

## 2022-10-13 PROBLEM — D69.6 THROMBOCYTOPENIA (HCC): Status: ACTIVE | Noted: 2019-02-04

## 2022-10-13 PROBLEM — I50.20 SYSTOLIC CONGESTIVE HEART FAILURE (HCC): Status: ACTIVE | Noted: 2019-02-04

## 2022-10-13 PROBLEM — I42.8 NICM (NONISCHEMIC CARDIOMYOPATHY) (HCC): Status: ACTIVE | Noted: 2019-06-27

## 2022-10-13 PROBLEM — E87.5 HYPERKALEMIA: Status: ACTIVE | Noted: 2019-02-04

## 2022-10-13 PROCEDURE — 99306 1ST NF CARE HIGH MDM 50: CPT | Performed by: INTERNAL MEDICINE

## 2022-10-13 NOTE — PROGRESS NOTES
[facility] notes  SHORT TERM REHAB       NAME: Siddhartha Daugherty  AGE: 76 y o   SEX: female    DATE OF ENCOUNTER: 10/13/2022    Assessment and Plan   Failure to thrive in adult  Continue with encouraging oral intake  Continue to feed patient  Also son thinks she may not like the type off she is eating   Speech to eval and treat   Continue monitoring intake  Will repeat labs     Chronic systolic heart failure (HCC)  Wt Readings from Last 3 Encounters:   10/12/22 63 9 kg (140 lb 14 oz)   10/12/22 63 9 kg (140 lb 14 oz)   08/31/22 64 8 kg (142 lb 13 7 oz)   EF reported to be 35-40% in 2019  Continue to hold lasix for now but if starts to develop CHF will restart meds  Lasix being held due to her poor oral intake   Continue monitoring weight      Atrial fibrillation (HCC)  Currently not on meds   Rate controlled  Continue monitoring rate     Essential hypertension  BP seems to be in good rang  Continue current meds  Continue monitoring BP    Neurosarcoidosis  Continue prednisone  Continue follow up with neurology      Seizures ([facility] [address] )  No recent seizures reported  Continue current meds  Continue seizure precaution    Hypothyroidism  TSH and Free T4 low in the hospital  Will repeat labs  Continue current meds    Ambulatory dysfunction  Continue with safety measures  PT/OT eval and treat  Continue with safety measures     Stage 3b chronic kidney disease ([facility] [address] )  Lab Results   Component Value Date    EGFR 46 10/11/2022    EGFR 53 10/06/2022    EGFR 48 10/05/2022    CREATININE 1 12 10/11/2022    CREATININE 1 00 10/06/2022    CREATININE 1 09 10/05/2022     Continue to encourage fluids  Repeat BMP      Hypokalemia  Will repeat BMP  Last one was 3 2 K+ level    GERD (gastroesophageal reflux disease)  Continue with pepcid  Is on prednisone which may increase symptoms           Chief Complaint     Unable to get due to dysarthria     History of Present Illness     77 yo female seen for admission to [facility] after hospitalization  Patient unable to give history due to her dysarthria  History obtained from son over he phone  As per son most of the problems started after a fall few months ago  Prior to the fall she was ambulatory  After the fall she was taken to the hospital for evaluation and all test were negative and was taken home  She continue to have multiple different symptoms that she would be brought back to the hospital  At the end it was thought to be that he patient had concussion  Son also reports that her speech also had declined after the fall  She now started to have poor oral intake and was brought to the hospital  She as evaluated and found to have no acute findings  She was sleepy and had her medications changed  Patient once was close to baseline was discharged to [facility]  Reviewed labs and imaging reports from the hospital records  PMHx     Past Medical History:   Diagnosis Date   • Disease of thyroid gland    • Hypertension    • Sarcoidosis    • Seizure ([facility] [address] )    • Stroke [facility])      Past Surgical History:   Procedure Laterality Date   • APPENDECTOMY     • BRAIN SURGERY     • EYE SURGERY     • HYSTERECTOMY       Family History   Problem Relation Age of Onset   • Coronary artery disease Mother    • Hypertension Mother    • Anxiety disorder Son      Social History     Socioeconomic History   • Marital status:       Spouse name: None   • Number of children: None   • Years of education: None   • Highest education level: None   Occupational History   • None   Tobacco Use   • Smoking status: Never Smoker   • Smokeless tobacco: Never Used   Vaping Use   • Vaping Use: Never used   Substance and Sexual Activity   • Alcohol use: Not Currently   • Drug use: Never   • Sexual activity: Not Currently   Other Topics Concern   • None   Social History Narrative   • None     Social Determinants of Health     Financial Resource Strain: Not on file   Food Insecurity: No Food Insecurity • Worried About [address] in the Last Year: Never true   • Ran Out of Food in the Last Year: Never true   Transportation Needs: No Transportation Needs   • Lack of Transportation (Medical): No   • Lack of Transportation (Non-Medical): No   Physical Activity: Not on file   Stress: Not on file   Social Connections: Not on file   Intimate Partner Violence: Not on file   Housing Stability: Not on file     Allergies   Allergen Reactions   • Iodine - Food Allergy Hives and Other (See Comments)     cath dye         Review of Systems     Unable to get due to her dysarthria        Objective   Vital signs:  BP: 132/71  HR: 57  RR: 20  TEMP: 96 6F  SAT : 95% on RA    PHYSICAL EXAM:  GENERAL: no acute distress, chronically ill appearing   SKIN: warm, dry, no rash, no cyanosis  HEENT: normocephalic, atraumatic, no JVD, no Thyromegaly, no lymphadenopathy  LUNGS: CTA, no wheezing, no rales, expanded equally, no chest tenderness   HEART: normal rhythm, normal rate, no murmur, no gallop  ABDOMEN: soft non tender non distended bs+, no guarding or rebound tenderness  :  no suprapubic tenderness  MUSCULOSKELETAL: strength about 4+/5 all extremities except right hemiparesis but lower slightly better then upper, no calf tenderness  NEUROLOGY: awake, alert, dysarthria, CN2-12 intact except hard of hearing  PSYCH: cooperative, follows command      Pertinent Laboratory/Diagnostic Studies:  Recent labs and diagnostic tests reviewed in nursing home EMR    Current Medications   Medications reviewed and signed off on nursing home EMR

## 2022-10-13 NOTE — ASSESSMENT & PLAN NOTE
Lab Results   Component Value Date    EGFR 46 10/11/2022    EGFR 53 10/06/2022    EGFR 48 10/05/2022    CREATININE 1 12 10/11/2022    CREATININE 1 00 10/06/2022    CREATININE 1 09 10/05/2022     Continue to encourage fluids  Repeat BMP

## 2022-10-13 NOTE — ASSESSMENT & PLAN NOTE
Continue with encouraging oral intake  Continue to feed patient  Also son thinks she may not like the type off she is eating   Speech to eval and treat   Continue monitoring intake  Will repeat labs

## 2022-10-13 NOTE — ASSESSMENT & PLAN NOTE
Phone call to patient and left VMM to confirm MFM NT US appt :  [facility] sent you an important message via [facility]  Message contains a video link by [employer] that explains genetic testing verus screening and information on how to check your insurance coverage for specialty genetic labs  Our perinatology Doctors encourage you to review this information prior to your C/ Jamari Jacobsen appointment, after the ultrasound is complete the Dr  will do a consult with you and discuss lab ordering  After viewing video if you have any questions please call the nurse line @ # [phone]  We look forward to seeing you soon at [facility]  Wt Readings from Last 3 Encounters:   10/12/22 63 9 kg (140 lb 14 oz)   10/12/22 63 9 kg (140 lb 14 oz)   08/31/22 64 8 kg (142 lb 13 7 oz)   EF reported to be 35-40% in 2019  Continue to hold lasix for now but if starts to develop CHF will restart meds  Lasix being held due to her poor oral intake   Continue monitoring weight

## 2022-10-19 ENCOUNTER — NURSING HOME VISIT (OUTPATIENT)
Dept: GERIATRICS | Facility: OTHER | Age: 77
End: 2022-10-19
Payer: COMMERCIAL

## 2022-10-19 DIAGNOSIS — R26.2 AMBULATORY DYSFUNCTION: ICD-10-CM

## 2022-10-19 DIAGNOSIS — G40.909 SEIZURE DISORDER (HCC): ICD-10-CM

## 2022-10-19 DIAGNOSIS — E43 SEVERE PROTEIN-CALORIE MALNUTRITION (HCC): Primary | ICD-10-CM

## 2022-10-19 DIAGNOSIS — I48.0 PAROXYSMAL ATRIAL FIBRILLATION (HCC): ICD-10-CM

## 2022-10-19 DIAGNOSIS — D86.89 NEUROSARCOIDOSIS IN ADULT: ICD-10-CM

## 2022-10-19 DIAGNOSIS — E87.6 HYPOKALEMIA: ICD-10-CM

## 2022-10-19 DIAGNOSIS — I50.22 CHRONIC SYSTOLIC HEART FAILURE (HCC): ICD-10-CM

## 2022-10-19 DIAGNOSIS — E03.9 HYPOTHYROIDISM, UNSPECIFIED TYPE: ICD-10-CM

## 2022-10-19 PROCEDURE — 99309 SBSQ NF CARE MODERATE MDM 30: CPT | Performed by: NURSE PRACTITIONER

## 2022-10-20 NOTE — PROGRESS NOTES
[address], [address] [place] [address], [address] [place] [address]  [phone]    NAME: Pablo Perkins  AGE: 76 y o  SEX: female    Progress Note    [place]  POS: 32 (SNF)    Assessment/Plan:    Severe protein-calorie malnutrition ([facility] )  Malnutrition Findings:   Deemed Failure to Thrive in adults  Poor meal/ fluid intake in STR  Needs full assistance with meals  Low Albumin/TProtein levels (10/14/2022)  Small increment of weight loss per wt today (10/19/2022)    BMI Findings: 29 8 kg/m2 (10/19/2022)  Continue supplements: Vit D daily/ Vit B complex daily/ House shakes TID  Continue Enhanced diet with Mechanilca soft texture  Continue weekly weight while in STR  RD to follow    Chronic systolic heart failure (HCC)  Wt Readings from Last 3 Encounters:   10/12/22 63 9 kg (140 lb 14 oz)   10/12/22 63 9 kg (140 lb 14 oz)   08/31/22 64 8 kg (142 lb 13 7 oz)   Admission wt in STR: 143 0 lbs (10/12/2022)  Current wt: 142 4 lbs (10/19/2022)  Euvolemic on assessment  No B/L LE edema   Not on diuretic therapy  BP ad HR stable  Continue weekly weight check      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  Continue Prednisone 7 5mg daily/ Famotidine 20mg BID    Seizure disorder (HCC)  No reported seizure since admitted to [facility]  Continue home meds:  Phenytoin ER 100mg 3 x weekly (MWF) / Lamotrigine 150mg Q12 hours  Atrial fibrillation (HCC)  BP range (10/12-19/2022) = 128/38 to 149/75  Continue ASA 81mg daily/ Amlodipine 5m g daily    Hypokalemia  Resolved  K level: 3 8 (10/14/2022)    Ambulatory dysfunction  Continue PT/OT/ST as scheduled  Continue fall precaution  Hypothyroidism  TSH WNL (10/14/2022) = 0 79  Continue levothyroxine 50 mcg daily      Chief complaint / Reason for visit: Follow-up visit    History of Present Illness:   This is a 73-year-old female patient currently admitted at [facility] (10/12/2022  To present) following discharge from acute care hospitalization H  [facility]) with Dx of Failure to Thrive in Adult, Severe Protein Calorie Malnutrition, Hypokalemia  Patient is seen and examined today to follow up acute and chronic medical conditions as mentioned above and Chronic Systolic HF, Neurosarcoidosis, Seizures, Paroxysmal Atrial Fibrillation, hypothyroidism and ambulatory dysfunction with deconditioning  Patient is still in bed for this visit - initially asleep but was easily awakened and able to maintain full wakefulness during this visit  Patient is calm, pleasant and not in distress  Noted dysarthria - speech is very difficult to understand versus Aphasia  Patient points to left leg and said " pain" then proceed to speak non intelligible words followed up by pointing to floor and said " floor"  Patient maintains very good eye contact - smiles most of the time but unable to assess ROS nor able to participate or follow instruction during physical assessment  Limited physical assessment due to non participation  Update and information is provided by nursing:  Poor appetite - full assistance with needs and meal times  Per nursing, patient also has slowed understanding of spoken communication - " takes her a long time but she often can speak in short sentences but very delayed  You have to repeat it up to 7 times"  Per CNA - patient has tongue movement that seem to push her food away or takes a very long time to swallow  However, per CNA, patient can strongly indicate if she does not want to further eat - cam say "No" or push hands to indicate "away"  Per nursing, family visits often and bring food and feed patient  Other than the poor intake, no acute medical concerns for this visit  Review of Systems:  Per history of present illness, all other systems reviewed and negative      HISTORY:  Medical Hx: Reviewed, unchanged  Family Hx: Reviewed, unchanged  Soc Hx: Reviewed,  unchanged    ALLERGY: Reviewed, unchanged  Allergies   Allergen Reactions   • Iodine - Food Allergy Hives and Other (See Comments)     cath dye PHYSICAL EXAM:  Vital Signs:  T96 6F -P57 -r18 BP: 135/70 Spo2: 95% RA  Weight:  142 4 lbs (10/19/2022) <= 143 0 lbs (10/12/2022)    General: NAD  Head: Atraumatic  Normocephalic  Ear: Very Hard of Hearing  Eye Exam: anicteric sclera, no discharge, PERRLA, No injection  Wears glasses  Oral Exam: moist mucous membranes, no buccaloropharyngeal erythema, palatine tonsils WNL  Edentulous  Neck Exam: no anterior cervical lymphadenopathy noted, neck supple  Cardiovascular: regular rate, regular rhythm, no murmurs, rubs, or gallops  Pulmonary: no wheeze, no rhonchi, no rales  No chest tenderness  Abdominal: soft, non-tender, nondistended, bowel sounds audible x 4 quadrants  Ave meal completion: 25-50% with multiple declined meals  : Non distended bladder  Incontinent  BM daily  Loose stools today per documentation  Extremities and skin: no edema noted, no rashes  Intact skin  Neurological: alert, moving all 4 extremities symmetrically       Laboratory results / Imaging reviewed: Hard copy/ies in medical chart:    * CBC wo diff (10/14/2022):   HEMOGLOBIN 12 3 g/dL 11 5-14 5  Final         HEMATOCRIT 35 6 % 35 0-43 0  Final         WBC 5 9 thou/cmm 4 0-10 0  Final         RBC 3 79 mill/cmm 3 70-4 70  Final         PLATELET COUNT 453 thou/cmm 140-350  Final         MPV 9 1 fL 7 5-11 3  Final         MCV 94 fL   Final         MCH 32 5 pg 26 0-34 0  Final         MCHC 34 6 g/dL 32 0-37 0  Final         RDW 16 7 % 12 0-16 0 H Final       * CMP (10/14/2022):   GLUCOSE 67 mg/dL 65-99  Final         BUN 6 mg/dL 7-25 L Final         CREATININE 0 81 mg/dL 0 40-1 10  Final         SODIUM 141 mmol/L 135-145  Final         POTASSIUM 3 8 mmol/L 3 5-5 2  Final         CHLORIDE 110 mmol/L 100-109 H Final         CARBON DIOXIDE 26 mmol/L 23-31  Final         CALCIUM 8 4 mg/dL 8 5-10 1 L Final         ALKALINE PHOSPHATASE 118 U/L   Final         ALBUMIN 2 8 g/dL 3 5-4 8 L Final         BILIRUBIN,TOTAL 0 6 mg/dL 0 2-1 0  Final   Use of this assay is not recommended for patients undergoing treatment   with eltrombopag due to the potential for falsely elevated results       PROTEIN, TOTAL 5 6 g/dL 6 3-8 3 L Final         AST 25 U/L <41  Final         ALT 15 U/L <56  Final         ANION GAP 5  3-11  Final         eGFRcr 74  >59  Final         eGFRcr COMMENT (Note)    Final    Interpretive information: calculated GFR         * TSH (10/14/2022) = 0 79 (WNL) <= 0 194 (L: 10/5/2022))    * Free T4 (10/4/2022) = 0 65 (L)    * Viral Panel (10/11/2022) = Negative      Current Medications: All medications reviewed and updated in Nursing Home Chart    Please note:  Voice-recognition software may have been used in the preparation of this document  Occasional wrong word or "sound-alike" substitutions may have occurred due to the inherent limitations of voice recognition software  Interpretation should be guided by context      Rafa Jennings  10/19/2022

## 2022-10-20 NOTE — ASSESSMENT & PLAN NOTE
Malnutrition Findings:   Deemed Failure to Thrive in adults  Poor meal/ fluid intake in STR  Needs full assistance with meals  Low Albumin/TProtein levels (10/14/2022)  Small increment of weight loss per wt today (10/19/2022)    BMI Findings: 29 8 kg/m2 (10/19/2022)  Continue supplements: Vit D daily/ Vit B complex daily/ House shakes TID  Continue Enhanced diet with Mechanilca soft texture     Continue weekly weight while in STR  RD to follow

## 2022-10-20 NOTE — ASSESSMENT & PLAN NOTE
No reported seizure since admitted to [facility]  Continue home meds:  Phenytoin ER 100mg 3 x weekly (MWF) / Lamotrigine 150mg Q12 hours

## 2022-10-24 ENCOUNTER — NURSING HOME VISIT (OUTPATIENT)
Dept: GERIATRICS | Facility: OTHER | Age: 77
End: 2022-10-24
Payer: COMMERCIAL

## 2022-10-24 DIAGNOSIS — E43 SEVERE PROTEIN-CALORIE MALNUTRITION (HCC): Primary | ICD-10-CM

## 2022-10-24 DIAGNOSIS — R26.2 AMBULATORY DYSFUNCTION: ICD-10-CM

## 2022-10-24 DIAGNOSIS — I48.0 PAROXYSMAL ATRIAL FIBRILLATION (HCC): ICD-10-CM

## 2022-10-24 DIAGNOSIS — R68.89 SUSPECTED DEEP TISSUE INJURY: ICD-10-CM

## 2022-10-24 DIAGNOSIS — R56.9 SEIZURES (HCC): ICD-10-CM

## 2022-10-24 DIAGNOSIS — N18.32 STAGE 3B CHRONIC KIDNEY DISEASE (HCC): ICD-10-CM

## 2022-10-24 DIAGNOSIS — I50.22 CHRONIC SYSTOLIC HEART FAILURE (HCC): ICD-10-CM

## 2022-10-24 PROCEDURE — 99309 SBSQ NF CARE MODERATE MDM 30: CPT | Performed by: NURSE PRACTITIONER

## 2022-10-25 ENCOUNTER — TELEPHONE (OUTPATIENT)
Dept: OTHER | Facility: OTHER | Age: 77
End: 2022-10-25

## 2022-10-25 NOTE — TELEPHONE ENCOUNTER
Joi Mejia called from [employer] called, requesting a kelton back from on call regarding fall   Provider paged via [facility]

## 2022-10-25 NOTE — PROGRESS NOTES
[address], [address] [place] [address], [address] [place] [address]  [phone]    NAME: Jennifer El  AGE: 76 y o  SEX: female    Progress Note    [place]  POS: 32 (SNF)    Assessment/Plan:    Severe protein-calorie malnutrition ([facility] )  Continues with poor appetite  BMI Findings: 29 8 kg/m2 (10/19/2022)  Continue supplements: Vit D daily/ Vit B complex daily/ House shakes TID  Continue Enhanced diet with Mechanical soft texture: edentulous  Continue weekly weight while in STR  RD following  BMP on 10/28/2022     Chronic systolic heart failure (HCC)  Wt Readings from Last 3 Encounters:  10/12/22 63 9 kg (140 lb 14 oz)  10/12/22 63 9 kg (140 lb 14 oz)  08/31/22 64 8 kg (142 lb 13 7 oz)  Admission wt in STR: 143 0 lbs (10/12/2022)  Current wt: 142 4 lbs (10/19/2022)  Euvolemic on assessment  No B/L LE edema   Not on diuretic therapy  Continue weekly weight check      Seizure disorder ([facility] )  No reported seizure since admitted to [facility]  Continue home meds:  Phenytoin ER 100mg 3 x weekly (MWF) / Lamotrigine 150mg Q12 hours      Atrial fibrillation (HCC)  BP range (10/12-24/2022) = 114/81 to 149/75  Continue ASA 81mg daily/ Amlodipine 5mg daily     Ambulatory dysfunction  Continue PT/OT/ST as scheduled  Continue fall precaution      Suspected DTI  Site: B/L gluteal areas  Two small partial thickness open areas - reported today  Start Z-guard BID + PRN  Start Pressure relieving mattress  Turn and reposition patient as often as neeed when in bed  Start ProSource 30ml BID    Stage 3b Chronic kidney disease ([facility] )  Most recent BMP (10/14/2022): Crea: 0 81/ BUN: 6 / eGFR: 74  Continue to avoid hypotensive episodes and use of nephrotoxic medications  At risk for hydration concerns related to poor oral fluid/meal intake  BMP on 10/28/2022      Chief complaint / Reason for visit: Follow-up visit    History of Present Illness:   This is a 78-year-old female patient currently admitted at [facility] (10/12/2022  To present) following discharge from acute care hospitalization H  [facility]) with Dx of Failure to Thrive in Adult, Severe Protein Calorie Malnutrition, Hypokalemia      Patient is seen and examined today to follow up acute and chronic medical conditions as mentioned above and Chronic Systolic HF, Neurosarcoidosis, Seizures, Paroxysmal Atrial Fibrillation, hypothyroidism and ambulatory dysfunction with deconditioning      Patient is still in bed for this visit - alert, did not participate during assessment including ROS assessment/ mentation/orientation on this visit  One CNA present on this visit - [facility] morning care  Update and information is provided by nursing:  continues with poor appetite and meal completion - multiple declined meals as documented  Per nursing, prior shift CNA reported 2 open wounds to B/L gluteal areas - on assessment suspicious for DTI - otherwise no acute medical concerns for this visit  Review of Systems:  Per history of present illness, all other systems reviewed and negative  HISTORY:  Medical Hx: Reviewed, unchanged  Family Hx: Reviewed, unchanged  Soc Hx: Reviewed,  unchanged    ALLERGY: Reviewed, unchanged  Allergies   Allergen Reactions   • Iodine - Food Allergy Hives and Other (See Comments)     cath dye          PHYSICAL EXAM:  Vital Signs:  T97 0F -P60 -R16 BP: 125/70 SpO2: 95% RA  Weight: 142 4 lbs (10/19/2022)    General: NAD  Head: Atraumatic  Normocephalic  Ear: Very Hard of Hearing  Eye Exam: anicteric sclera, no discharge, PERRLA, No injection  Wears glasses  Oral Exam: moist mucous membranes, no buccaloropharyngeal erythema, palatine tonsils WNL  Edentulous  Neck Exam: no anterior cervical lymphadenopathy noted, neck supple  Cardiovascular: regular rate, regular rhythm, no murmurs, rubs, or gallops  Pulmonary: no wheeze, no rhonchi, no rales  No chest tenderness  Limited assessment as patient did not participate nor follows instructions during this visit    Abdominal: soft, non-tender, nondistended, bowel sounds audible x 4 quadrants  Ave meal completion: 25-50% with multiple declined meals  : Non distended bladder  Incontinent  BM daily  Extremities and skin: no edema noted, no rashes  2 open small wounds to B/L Gluteal areas only  Neurological: alert, moving all 4 extremities symmetrically  Ambulatory dysfunction  Laboratory results / Imaging reviewed: Hard copy/ies in medical chart  Last labs done on 10/14/2022  Current Medications: All medications reviewed and updated in Nursing Home Chart    Please note:  Voice-recognition software may have been used in the preparation of this document  Occasional wrong word or "sound-alike" substitutions may have occurred due to the inherent limitations of voice recognition software  Interpretation should be guided by context      Tod Zamudio  10/24/2022

## 2022-10-27 ENCOUNTER — TELEPHONE (OUTPATIENT)
Dept: OTHER | Facility: OTHER | Age: 77
End: 2022-10-27

## 2022-10-27 NOTE — TELEPHONE ENCOUNTER
[phone] from [employer] [facility] called to advise the on-call that pt fell  Pt has no injuries or bruises  Dr Efraín Hearn was page via TC    Please allow the on-call provider 20-30 mins to respond  If you have not heard from them within that time, please feel free to call back

## 2022-10-28 ENCOUNTER — NURSING HOME VISIT (OUTPATIENT)
Dept: GERIATRICS | Facility: OTHER | Age: 77
End: 2022-10-28

## 2022-10-28 DIAGNOSIS — N18.32 STAGE 3B CHRONIC KIDNEY DISEASE (HCC): ICD-10-CM

## 2022-10-28 DIAGNOSIS — R26.2 AMBULATORY DYSFUNCTION: ICD-10-CM

## 2022-10-28 DIAGNOSIS — I48.0 PAROXYSMAL ATRIAL FIBRILLATION (HCC): ICD-10-CM

## 2022-10-28 DIAGNOSIS — R68.89 SUSPECTED DEEP TISSUE INJURY: ICD-10-CM

## 2022-10-28 DIAGNOSIS — I50.22 CHRONIC SYSTOLIC HEART FAILURE (HCC): ICD-10-CM

## 2022-10-28 DIAGNOSIS — R56.9 SEIZURES (HCC): ICD-10-CM

## 2022-10-28 DIAGNOSIS — E43 SEVERE PROTEIN-CALORIE MALNUTRITION (HCC): Primary | ICD-10-CM

## 2022-10-29 NOTE — PROGRESS NOTES
[address], [address] [place] [address], [address] [place] [address]  [phone]    NAME: Deb Hernandez  AGE: 76 y o  SEX: female    Progress Note    [place]  POS: 32 (SNF)    Assessment/Plan:    Severe protein-calorie malnutrition ([facility] )  Continues with poor appetite  BMI Findings: 30 4 kg/m2 (10/26/2022) <= 29 8 kg/m2 (10/19/2022)  Small increment of weight gain  Continue supplements: Vit D daily/ Vit B complex daily/ House shakes TID  Continue Enhanced diet with Mechanical soft texture: edentulous  Continue weekly weight while in STR  RD following     Chronic systolic heart failure (HCC)  Wt Readings from Last 3 Encounters:  10/12/22          63 9 kg (140 lb 14 oz)  10/12/22          63 9 kg (140 lb 14 oz)  08/31/22          64 8 kg (142 lb 13 7 oz)  Admission wt in STR: 143 0 lbs (10/12/2022)  Current wt: 145 4 lbs (10/26/2022) <= 142 4 lbs (10/19/2022)  Euvolemic on assessment  No B/L LE edema   Not on diuretic therapy  Continue weekly weight check       Seizure disorder (HCC)  No reported seizure since admitted to STR  Continue home meds:  Phenytoin ER 100mg 3 x weekly (MWF) / Lamotrigine 150mg Q12 hours      Atrial fibrillation (HCC)  Stable    BP range (10/12-28/2022) = 114/81 to 149/75  HR range  (10/12-28/2022) = 57 to 65/min   Continue ASA 81mg daily/ Amlodipine 5mg daily    Stage 3b Chronic kidney disease ([facility] )  Renal functions WNL (10/28/2022): Crea: 0 84/ BUN: 10 / eGFR: 71  Continue to avoid hypotensive episodes and use of nephrotoxic medications  At risk for hydration concerns related to poor oral fluid/meal intake     Ambulatory dysfunction  Continue PT/OT/ST as scheduled  Nursing report 2nd episode of fall since admission to STR  Continue fall precaution      Suspected DTI  Site: B/L gluteal areas  Resolving: dry scabbed and no signs of infection  Continue Z-guard BID + PRN  Continue ProSource 30ml BID  Continue Pressure relieving mattress  Turn and reposition patient as often when in bed       Chief complaint / Reason for visit: Follow-up visit    History of Present Illness: This is a 68-year-old female patient currently admitted at [facility] (10/12/2022  To present) following discharge from acute care hospitalization H  [facility]  [facility]) with Dx of Failure to Thrive in Adult, Severe Protein Calorie Malnutrition, Hypokalemia      Patient is seen and examined today to follow up acute and chronic medical conditions as mentioned above and Chronic Systolic HF, Neurosarcoidosis, Seizures, Paroxysmal Atrial Fibrillation, hypothyroidism and ambulatory dysfunction with deconditioning      Patient is in bed for this visit - somnolent, did not participate during assessment including ROS assessment/ mentation/orientation on this visit  Son present on this visit  Updated on patient findings including episode of alertness, engagement and clear but slow speech this week  Son also informed of persistent poor appetite but somewhat improving sine admission to [facility]  Per son, this is patient's baesline mentation/ orientation at home  Patient is also reported to have second fall this week per nursing - no evident injury/ complaonts of pain - Neuro checks WNL  Update and information is provided by nursing for this visit as well  Patient did not eat breakfast  Per nursing, other than the 2nd fall, no otherwise no acute medical concerns for this visit  Review of Systems:  Per history of present illness, all other systems reviewed and negative  HISTORY:  Medical Hx: Reviewed, unchanged  Family Hx: Reviewed, unchanged  Soc Hx: Reviewed,  unchanged    ALLERGY: Reviewed, unchanged  Allergies   Allergen Reactions   • Iodine - Food Allergy Hives and Other (See Comments)     cath dye          PHYSICAL EXAM:  Vital Signs: T97 0F -P65 -R16 BP: 140/72 SpO2: 97% RA  Weight: 145 4 lbs (10/26/2022) <= 142 4 lbs (10/19/2022)    General: NAD  Head: Atraumatic  Normocephalic  Ear: Hard of Hearing - no hearing aids    Eye Exam: anicteric sclera, no discharge, PERRLA, No injection  Wears glasses  Oral Exam: moist mucous membranes, no buccaloropharyngeal erythema, palatine tonsils WNL  Edentulous  Neck Exam: no anterior cervical lymphadenopathy noted, neck supple  Cardiovascular: regular rate, regular rhythm, no murmurs, rubs, or gallops  Pulmonary: no wheeze, no rhonchi, no rales  No chest tenderness  Limited assessment as patient did not participate nor follows instructions during this visit  Abdominal: soft, non-tender, nondistended, bowel sounds audible x 4 quadrants  Ave meal completion: 25-50% with multiple declined meals  : Non distended bladder  Incontinent  BM daily  Extremities and skin: no edema noted, no rashes  2 open small wounds to B/L Gluteal areas - resolving  Neurological: somnolent but easily awakened  moving all 4 extremities symmetrically  Ambulatory dysfunction  Laboratory results / Imaging reviewed: Hard copy/ies in medical chart:    * BMP (10/28/2022):   GLUCOSE 56 mg/dL 65-99 L Final   Specimen slightly hemolyzed, results may be affected        BUN 10 mg/dL 7-25  Final   Specimen slightly hemolyzed, results may be affected       CREATININE 0 84 mg/dL 0 40-1 10  Final   Specimen slightly hemolyzed, results may be affected       SODIUM 142 mmol/L 135-145  Final   Specimen slightly hemolyzed, results may be affected       POTASSIUM 4 5 mmol/L 3 5-5 2  Final   Specimen slightly hemolyzed, results may be affected       CHLORIDE 111 mmol/L 100-109 H Final   Specimen slightly hemolyzed, results may be affected       CARBON DIOXIDE 26 mmol/L 23-31  Final   Specimen slightly hemolyzed, results may be affected       CALCIUM 8 3 mg/dL 8 5-10 1 L Final   Specimen slightly hemolyzed, results may be affected       ANION GAP 5  3-11  Final   Specimen slightly hemolyzed, results may be affected       eGFRcr 71  >59  Final   Specimen slightly hemolyzed, results may be affected       eGFRcr COMMENT (Note)    Final       Current Medications: All medications reviewed and updated in Nursing Home Chart    Please note:  Voice-recognition software may have been used in the preparation of this document  Occasional wrong word or "sound-alike" substitutions may have occurred due to the inherent limitations of voice recognition software  Interpretation should be guided by waylon Knox  10/29/2022

## 2022-11-03 PROBLEM — S82.91XA: Status: ACTIVE | Noted: 2022-01-01

## 2022-11-03 PROBLEM — D64.9 ANEMIA: Status: ACTIVE | Noted: 2022-01-01

## 2022-11-03 PROBLEM — U07.1 COVID-19 VIRUS DETECTED: Status: ACTIVE | Noted: 2022-01-01

## 2022-11-03 PROBLEM — D62 ACUTE BLOOD LOSS ANEMIA (ABLA): Status: ACTIVE | Noted: 2022-01-01

## 2022-11-03 NOTE — ED PROVIDER NOTES
History  Chief Complaint   Patient presents with   • Leg Swelling     Pt is coming in with R Leg Swelling from [address]  Staff states the sxs just arised today but on presentation looks like it has been ongoing for quite some time  R Leg is swollen throughout and upper R leg has more discoloration and some sweeping  Ecchymosis noted on medial R  Ankle and throughout upper R Leg      75-year-old female from a nursing home with history of atrial fibrillation, chronic meningitis, hemorrhagic CVA with resultant right-sided hemiparesis presents to the ED with right leg swelling that apparently was noticed today at the nursing home  Patient is unable to provide history  She is crying stating “help me”  She does not answer any questions  Uncertain if this is her normal baseline  History provided by:  EMS personnel and nursing home  History limited by:  Dementia  Leg Pain  Location:  Leg  Time since incident:  1 day  Injury: no    Leg location:  R upper leg and R lower leg  Pain details:     Quality:  Unable to specify    Severity:  Unable to specify  Prior injury to area:  Unable to specify  Relieved by:  None tried  Associated symptoms: swelling        Prior to Admission Medications   Prescriptions Last Dose Informant Patient Reported? Taking?    Nutritional Supplements ([employer] No Carb) LIQD   Yes No   Sig: Take 30 mL by mouth 2 (two) times a day   QUEtiapine (SEROquel) 25 mg tablet   Yes No   Sig: Take 12 5 mg by mouth 2 (two) times a day   acetaminophen (TYLENOL) 500 mg tablet   Yes No   Sig: Take 1,000 mg by mouth 2 (two) times a day   allopurinol (ZYLOPRIM) 100 mg tablet   Yes No   Sig: Take 100 mg by mouth daily   amLODIPine (NORVASC) 5 mg tablet   No No   Sig: Take 1 tablet (5 mg total) by mouth daily Hold for systolic blood pressure below 130   ascorbic acid (VITAMIN C) 250 MG tablet   Yes No   Sig: Take 500 mg by mouth daily = 7 days (11/3-9/2022)   aspirin (ECOTRIN LOW STRENGTH) 81 mg EC tablet   Yes No   Sig: Take 81 mg by mouth every other day   atorvastatin (LIPITOR) 10 mg tablet   Yes No   Sig: Take 10 mg by mouth daily   b complex vitamins capsule   Yes No   Sig: Take 1 capsule by mouth daily   cholecalciferol (VITAMIN D3) 1,000 units tablet   Yes No   Sig: Take 1,000 Units by mouth daily   famotidine (PEPCID) 20 mg tablet   Yes No   Sig: Take 20 mg by mouth 2 (two) times a day   ipratropium-albuterol (DUO-NEB) 0 5-2 5 mg/3 mL nebulizer solution   Yes No   Sig: Take 3 mL by nebulization every 8 (eight) hours as needed = for 5 days only (until 11/6/2022)   lamoTRIgine (LaMICtal) 150 MG tablet   Yes No   Sig: Take 250 mg by mouth 2 (two) times a day   levothyroxine 50 mcg tablet   Yes No   Sig: Take 50 mcg by mouth daily   molnupiravir 200 mg capsule   Yes No   Sig: Take 800 mg by mouth every 12 (twelve) hours = from 11/3-7/2022 COVID-19   phenytoin (DILANTIN) 100 mg ER capsule   Yes No   Sig: Take 100 mg by mouth 3 (three) times a week = M/W/F   predniSONE 1 mg tablet   Yes No   Sig: Take 7 5 mg by mouth daily   zinc gluconate 50 mg tablet   Yes No   Sig: Take 50 mg by mouth daily = for 7 days (COVID) (11/2-8/2022)      Facility-Administered Medications: None       Past Medical History:   Diagnosis Date   • Disease of thyroid gland    • Hypertension    • Sarcoidosis    • Seizure ([facility] [address] )    • Stroke [facility])        Past Surgical History:   Procedure Laterality Date   • APPENDECTOMY     • BRAIN SURGERY     • EYE SURGERY     • HYSTERECTOMY         Family History   Problem Relation Age of Onset   • Coronary artery disease Mother    • Hypertension Mother    • Anxiety disorder Son      I have reviewed and agree with the history as documented      E-Cigarette/Vaping   • E-Cigarette Use Never User      E-Cigarette/Vaping Substances   • Nicotine No    • THC No    • CBD No    • Flavoring No    • Other No    • Unknown No      Social History     Tobacco Use   • Smoking status: Never Smoker   • Smokeless tobacco: Never Used Vaping Use   • Vaping Use: Never used   Substance Use Topics   • Alcohol use: Not Currently   • Drug use: Never       Review of Systems   Unable to perform ROS: Dementia       Physical Exam  Physical Exam  Vitals and nursing note reviewed  Constitutional:       General: She is not in acute distress  Appearance: She is well-developed and normal weight  She is ill-appearing  She is not toxic-appearing or diaphoretic  Comments: Patient crying, not answering questions   HENT:      Head: Normocephalic and atraumatic  Right Ear: External ear normal       Left Ear: External ear normal       Nose: Nose normal       Mouth/Throat:      Mouth: Mucous membranes are moist       Pharynx: No oropharyngeal exudate  Eyes:      General: No scleral icterus  Right eye: No discharge  Left eye: No discharge  Conjunctiva/sclera: Conjunctivae normal       Pupils: Pupils are equal, round, and reactive to light  Neck:      Thyroid: No thyromegaly  Vascular: No JVD  Trachea: No tracheal deviation  Cardiovascular:      Rate and Rhythm: Normal rate  Rhythm irregular  Heart sounds: Normal heart sounds  No murmur heard  No friction rub  No gallop  Pulmonary:      Effort: Pulmonary effort is normal  No respiratory distress  Breath sounds: Normal breath sounds  No stridor  No wheezing, rhonchi or rales  Chest:      Chest wall: No tenderness  Abdominal:      General: Bowel sounds are normal  There is no distension  Palpations: Abdomen is soft  There is no mass  Tenderness: There is no abdominal tenderness  Hernia: No hernia is present  Musculoskeletal:         General: Swelling present  Right lower leg: Edema present  Left lower leg: Edema present  Comments: Significant swelling of the right lower extremity with large amount of ecchymosis noted to the thigh, posterior thigh and below the knee  There also blisters to the posterior right thigh  There is also ecchymosis noted to the medial aspect of the right foot  Patient cries during examination of the right lower extremity  Lymphadenopathy:      Cervical: No cervical adenopathy  Skin:     General: Skin is warm and dry  Coloration: Skin is not jaundiced or pale  Findings: Bruising present  No erythema, lesion or rash  Neurological:      Mental Status: She is alert  She is disoriented  Motor: Weakness (Chronic right-sided hemiparesis) present  No abnormal muscle tone  Deep Tendon Reflexes: Reflexes are normal and symmetric           Vital Signs  ED Triage Vitals   Temperature Pulse Respirations Blood Pressure SpO2   11/03/22 1410 11/03/22 1410 11/03/22 1410 11/03/22 1410 11/03/22 1410   98 5 °F (36 9 °C) 69 18 (!) 104/48 97 %      Temp Source Heart Rate Source Patient Position - Orthostatic VS BP Location FiO2 (%)   11/03/22 1410 11/03/22 1556 11/03/22 1410 11/03/22 1410 --   Oral Monitor Lying Right arm       Pain Score       11/03/22 1410       6           Vitals:    11/03/22 1410 11/03/22 1556 11/03/22 1705   BP: (!) 104/48     Pulse: 69 57 59   Patient Position - Orthostatic VS: Lying Lying Lying         Visual Acuity      ED Medications  Medications   OLANZapine (ZyPREXA) IM injection 5 mg (has no administration in time range)       Diagnostic Studies  Results Reviewed     Procedure Component Value Units Date/Time    CBC and differential [[id number]]  (Abnormal) Collected: 11/03/22 1658    Lab Status: Final result Specimen: Blood from Arm, Right Updated: 11/03/22 1713     WBC 8 62 Thousand/uL      RBC 2 29 Million/uL      Hemoglobin 7 2 g/dL      Hematocrit 22 3 %      MCV 97 fL      MCH 31 4 pg      MCHC 32 3 g/dL      RDW 17 6 %      MPV 11 0 fL      Platelets 692 Thousands/uL      nRBC 1 /100 WBCs      Neutrophils Relative 77 %      Immat GRANS % 1 %      Lymphocytes Relative 12 %      Monocytes Relative 10 %      Eosinophils Relative 0 %      Basophils Relative 0 % Neutrophils Absolute 6 60 Thousands/µL      Immature Grans Absolute 0 08 Thousand/uL      Lymphocytes Absolute 1 06 Thousands/µL      Monocytes Absolute 0 84 Thousand/µL      Eosinophils Absolute 0 02 Thousand/µL      Basophils Absolute 0 02 Thousands/µL     Comprehensive metabolic panel [[id number]]  (Abnormal) Collected: 11/03/22 1546    Lab Status: Final result Specimen: Blood from Arm, Right Updated: 11/03/22 1641     Sodium 141 mmol/L      Potassium 5 2 mmol/L      Chloride 107 mmol/L      CO2 26 mmol/L      ANION GAP 8 mmol/L      BUN 20 mg/dL      Creatinine 1 06 mg/dL      Glucose 110 mg/dL      Calcium 7 8 mg/dL      Corrected Calcium 9 1 mg/dL      AST --     ALT 29 U/L      Alkaline Phosphatase 152 U/L      Total Protein 5 7 g/dL      Albumin 2 4 g/dL      Total Bilirubin 0 78 mg/dL      eGFR 50 ml/min/1 73sq m     Narrative:      [place] guidelines for Chronic Kidney Disease (CKD):   •  Stage 1 with normal or high GFR (GFR > 90 mL/min/1 73 square meters)  •  Stage 2 Mild CKD (GFR = 60-89 mL/min/1 73 square meters)  •  Stage 3A Moderate CKD (GFR = 45-59 mL/min/1 73 square meters)  •  Stage 3B Moderate CKD (GFR = 30-44 mL/min/1 73 square meters)  •  Stage 4 Severe CKD (GFR = 15-29 mL/min/1 73 square meters)  •  Stage 5 End Stage CKD (GFR <15 mL/min/1 73 square meters)  Note: GFR calculation is accurate only with a steady state creatinine    Phenytoin level, total [[id number]]  (Abnormal) Collected: 11/03/22 1546    Lab Status: Final result Specimen: Blood from Arm, Right Updated: 11/03/22 1641     Phenytoin Lvl 2 5 ug/mL     Protime-INR [[id number]]  (Normal) Collected: 11/03/22 1546    Lab Status: Final result Specimen: Blood from Arm, Right Updated: 11/03/22 1607     Protime 13 7 seconds      INR 1 05    APTT [[id number]]  (Normal) Collected: 11/03/22 1546    Lab Status: Final result Specimen: Blood from Arm, Right Updated: 11/03/22 1607     PTT 23 seconds                  XR hip/pelv 2-3 vws right   ED Interpretation by Radha Herrera DO (11/03 1542)   No fracture      Final Result by Alfredo Montanez DO (11/03 1706)      No acute osseous abnormality  Bilateral SI joint fusion  Given findings in the spine, consider ankylosing spondylitis  Cholelithiasis  Workstation performed: [id number]         XR femur 2 views RIGHT   ED Interpretation by Radha Herrera DO (11/03 1542)   No fracture      Final Result by Alfredo oMntanez DO (11/03 1715)      Negative for right femoral fracture  Luc[place] right fibular neck suspicious for fracture  Workstation performed: [id number]         XR tibia fibula 2 views RIGHT   ED Interpretation by Radha Herrera DO (11/03 1542)   Proximal fibular fracture      Final Result by Alfredo Montanez DO (11/03 1715)      Nondisplaced proximal fibular fracture  Questionable findings for fracture about the ankle  Recommend dedicated coned-down right ankle views  The study was marked in [facility] for immediate notification        Workstation performed: [id number]         VAS lower limb venous duplex study, unilateral/limited    (Results Pending)   CT head without contrast    (Results Pending)   XR ankle 3+ vw right    (Results Pending)            Negative for DVT  Procedures  ECG 12 Lead Documentation Only    Date/Time: 11/3/2022 2:51 PM  Performed by: Radha Herrera DO  Authorized by: Radha Herrera DO     Indications / Diagnosis:  Right leg swelling  ECG reviewed by me, the ED Provider: yes    Patient location:  ED  Previous ECG:     Previous ECG:  Compared to current    Similarity:  No change  Interpretation:     Interpretation: abnormal    Rate:     ECG rate:  68    ECG rate assessment: normal    Rhythm:     Rhythm: atrial fibrillation    Ectopy:     Ectopy: none    Conduction:     Conduction: abnormal      Abnormal conduction: complete LBBB    ST segments:     ST segments:  Non-specific  T waves:     T waves: normal               ED Course  ED Course as of 11/03/22 1756   Thu Nov 03, 2022 1753 Spoke with patient's son who states at the nursing home told him that she did fall  She does have a history of dementia but usually more alert than she is today  He is willing to sign blood transfusion consent form                               SBIRT 20yo+    Flowsheet Row Most Recent Value   SBIRT (23 yo +)    In order to provide better care to our patients, we are screening all of our patients for alcohol and drug use  Would it be okay to ask you these screening questions? Yes Filed at: 11/03/2022 1554   Initial Alcohol Screen: US AUDIT-C     1  How often do you have a drink containing alcohol? 0 Filed at: 11/03/2022 1554   2  How many drinks containing alcohol do you have on a typical day you are drinking? 0 Filed at: 11/03/2022 1554   3b  FEMALE Any Age, or MALE 65+: How often do you have 4 or more drinks on one occassion? 0 Filed at: 11/03/2022 1554   Audit-C Score 0 Filed at: 11/03/2022 1554   ANASTACIA: How many times in the past year have you    Used an illegal drug or used a prescription medication for non-medical reasons? Never Filed at: 11/03/2022 1554                    MDM  Number of Diagnoses or Management Options  Diagnosis management comments: 61-year-old female presents from a nursing home with right lower extremity swelling that apparently was just noticed today  Patient is unable to provide history as she seems very confused and is only crying  Examination of the right lower extremity reveals significant swelling along with almost diffuse ecchymosis  No deformity noted  Nursing home denies fall    Will obtain basic labs, will also x-ray lower extremity to rule out possibility of fracture and obtain venous duplex to rule out DVT       Amount and/or Complexity of Data Reviewed  Clinical lab tests: ordered and reviewed  Tests in the radiology section of CPT®: ordered and reviewed  Independent visualization of images, tracings, or specimens: yes        Disposition  Final diagnoses:   None     ED Disposition     None      Follow-up Information    None         Patient's Medications   Discharge Prescriptions    No medications on file       No discharge procedures on file      PDMP Review     None          ED Provider  Electronically Signed by           Jaskaran Anderson DO  11/03/22 231 [facility]  11/03/22 2020 [place], [place]  11/03/22 2040

## 2022-11-03 NOTE — ED NOTES
Pt is COVID (+) according to EMS from staff  Pt is also DNR, according to paperwork from staff   Copy is with clipboard     Kimberly [place] Robel  11/03/22 5769

## 2022-11-03 NOTE — ED NOTES
RN called vascular who stated they will be right down to do duplex study      Romelia Plascencia RN  11/03/22 3114

## 2022-11-03 NOTE — PROGRESS NOTES
[facility]  [address]  [phone]  Monika Damon 118  POS: 31: SNF/Short Term Rehab      NAME: Kishor Sullivan  AGE: 76 y o  SEX: female  DATE OF ADMISSION: OCTOBER 12, 2022  DATE OF DISCHARGE: November 3, 2022  DISCHARGE DISPOSITION: WILL TRANSITION TO [facility]    Reason for admission: Patient was admitted from [facility] for rehabilitation after hospitalization for ambulatory dysfunction and deconditioning  This is a 75-year-old female patient currently admitted at [facility] (10/12/2022 To present) following discharge from acute care hospitalization [facility]  [facility]) with Dx of Failure to Thrive in Adult, Severe Protein Calorie Malnutrition, Hypokalemia  Admission Diagnoses: Severe Protein Calorie Malnutrition  Discharge Diagnoses:   * Ambulatory dysfunction with deconditioning  * Severe Protein Calorie Malnutrition  * Chronic Systolic CHF  * Paroxysmal Atrial Fibrillation  * Seizures  * 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  * CKD3B  * Suspected Deep Tissue Injury  * Hypothyroidism  * COVID-19 virus dectected    Course of stay: Patient is currently admitted at  for rehabilitation services due to ambulatory dysfunction and deconditioning  Patient received 24/7 SNF supportive care, PT/OT/ST services  Patient tested positive for COVID-19 virus on 11/2/2022 by rapid test - started on supplements and anti-viral ([employer])  Patient continues with variable appetite: % but also have multiple declined meals  Per SW, last covered date for therapy is 11/2/2022 and patient will transition to [address] [place] [address] on 11/3/2022        Therapy Progress Note: Per PT/ OT/ST progress note, patient is:     PT Progress note:  * Bed Mobility:  - Roll left and right = Dependent  - Sit to lying = Dependent  - Lying to sitting on side of bed = Dependent     * Transfers:  - Sit to stand = Dependent  - Chair/bed-to-chair transfer = Dependent  - Toilet transfer = Dependent  - Car transfer = Dependent     * Ambulation:  - Walk 10 feet = Dependent  - Distance = 0 feet  - Assistive Device = No AD  - Assistance Needed = patient is unable  - Walk 50 feet with Two Turns = Dependent  - Walk 150 feet = Dependent:  - Walking 10 feet on uneven surfaces = Dependent     * Stairs/Curbs:  - 1 step (curb) = Dependent  - 4 steps = Dependent  - 12 steps = Dependent     * W/C Mobility:  - Resident uses a wheelchair and/or scooter? = No     * Other Picking up object = Dependent     Mobility Performance Raw Score (ranges from 15 - 90; 90 being the highest function) = 15     Assessment and Summary of Skilled Services Skilled Interventions Provided: Therapeutic exercises, therapeutic activities, neuromuscular re-education  Patient Progress & Response to Treatment: Patient has reached maximum potential with skilled services  Cognitive and behavioral problems limited pt to consistently participate in sessions  Team Communication/Collaboration: Pt discussed in rehab meetings and huddles  Pain:  - Can the patient Communicate pain? = No  - Test / LE Strength Five Times  - Sit to Stand = 0   - Patient is Unable to complete without physical assistance  - Pain Assessment: Pain Indicators for patients unable to communicate pain  - Breathing: Independent of vocalization = Normal  - Negative Vocalization = None  - Facial expression = Smiling or inexpressive  - Body Language = Relaxed  - Consolability = No Need to console  - What relieves pain? = Remaining at rest  - What exacerbates pain? = Prolonged Activity     Discharge Recommendations and Status Recommendations: Restorative ROM with am/pm care  Therapy Follow Up Established/Trained = Restorative Range of Motion Program Range of Motion Program Established / Trained: Written program issued  Prognosis to Maintain CLOF = Good with consistent staff follow-through         OT Progress note:  * Eating = Partial/moderate assistance  * Eating - P  Mod detail = Partial Assistance     * Hygiene:  - Oral hygiene = Partial/moderate assistance  - Oral hygiene - P  Mod detail = Partial Assistance  - Toileting hygiene = Substantial/maximal assistance     * Transfers:  - Toilet transfer = Substantial/maximal assistance     * Bathing:  - Shower/bathe self = Substantial/maximal assistance     * Dressing:  - Upper body dressing = Substantial/maximal assistance  - Lower body dressing = Substantial/maximal assistance  - Putting on/taking off footwear = Substantial/maximal assistance     Self Care Performance Raw Score (ranges from 7 - 42; 42 being the highest function) = 16     Assessment and Summary of Skilled Services Skilled Interventions Provided: Skilled interventions include therapeutic activities, self care training, cotreatment interventions with PT and ST to facilitate ability to follow directions, attend to task to increase participation in daily routines  Patient Progress & Response to Treatment: Patient has demonstrated minimal progress toward goals  Patient requires max A with all ADL routines due to both physical and cognitive deficits impacting ability tofollow 1 step directions and sequence tasks  Patient with variable responses to verbal and visual prompts, at times compliant, at times combative impacting overall progress and participation in structured tasks  Patient has reached max potential from skilled OT services at this time  Patient will remain in LTC upon dc with 24 hour caregivers available  Team Communication/Collaboration: Consultation with therapists to facilitate patient's highest level of functional independence, Collaborated with team regarding patient’s discharge/transition planning, Functional skills reviewed with team members and Participated in care planning meeting        Pain:  - Can the patient Communicate pain? = Yes  - Test/UE Strength Right Arm Curl Test = Not Tested (flaccid RUE)   - Pain Intensity = 4/10  - Frequency = Intermittent  - Location: B/ L feet  L thigh( lateral)  - What relieves pain? = Remaining at rest  - What exacerbates pain? = Movement     Discharge Recommendations and Status Recommendations: 24 hour care  Therapy Follow Up Established/Trained = Restorative ADL Program, Restorative Range of Motion Program ADL Program Established / Trained: see RNP Range of Motion Program Established / Trained: see RNP     Prognosis to Maintain CLOF = Good with consistent staff follow-through      ST Progress note:  * Prior Living Environment = Patient lived at home with others  * Prior Cognitive Assistance = Daily SUP (may be left alone 6-8 hrs or more)     * Discharge Location = Patient discharged to reside in this LTC facility  Assistance/Support to be Provided = AM assistance/caregiver available,PM assistance/caregiver available     Skilled Interventions Provided: Tasks to facilitate expressive and receptive linguistic skills, analysis of response to skilled techniques, AAC training and cueing hierarchy training, modeling with low tech AAC methods, graded cueing, education regarding compensatory strategies and overall cognitive communication skills to improve safety with independence provided to staff and family  Patient Progress & Response to Treatment: Pt progress has been limited d/t severity level, hearing impairment, as well as a high level of agitation occurring frequently during sessions d/t pain/discomfort and poor comprehension/orientation  At times, pt is unable to attend to attempts to redirect her or follow directions and is unable to recognize that others are not able to understand her  Staff carryover with low tech AAC boards provided has been inconsistent       Team Communication/Collaboration: Participated in care planning meeting, Functional skills reviewed with team members and Treatment results communicated to Interdisciplinary Team       Objective Tests - Results and Interpretation:   * Auditory Comprehension = Impaired  - How often does patient function safely without additional assistance/supervision due to comprehension deficits? = 26- 49% of the time  - How often does patient participate in communication exchanges without additional assistance? = 26- 49% of the time  - How often does patient understand simple msgs/convo related to routine daily activities in lowdemand situations? = 26-49% of the time  - How often does patient understand complex messages as expected for age in low-demand situations? = 26-49% of the time  - How often does patient understand simple msgs/convo related to routine daily activities in high-demand situations? = 0-25% of the time  - How often does patient understand complex messages as expected for age in high-demand situations? = 0-25% of the time     Reading Comprehension = Impaired:  - How often does patient comprehend basic sight words/phrases in everyday context? = 50-75% of the time  - How often does patient comprehend simple sentences that contain familiar vocabulary? = 50-75% of the time  - How often does patient comprehend simple written instructions/directions? = 50-75% of the time  - How often does patient comprehend lengthy material with familiar vocabulary? = 0-25% of the time  - How often does patient comprehend lengthy material with complex content/vocab? = 0-25% of the time  - How often does patient function safely without assistance/supervision due to reading deficits? = 26-49% of the time     Verbal Language Skills = Impaired:  - How often does patient exhibit expression difficulties noticeable or distracting to the listener? = 76-90% of the time  - How often does patient produce simple spoken words and phrases that are meaningful? = 26-49% of the time  - How often does patient participate in communication exchanges without additional assistance? = 26-49% of the time  - How often does patient produce verbal messages with appropriate form in low-demand situations? = 26-49% of the time  - How often does patient produce verbal messages with appropriate content in low-demand situations? = 26-49% of the time  - How often does patient produce verbal messages with appropriate form in high-demand situations? = 0-25% of the time  - How often does patient produce verbal messages with appropriate content in high-demand situations? = 0- 25% of the time     Written Expression = Impaired:  - Multi-Modality Multi-Modality Communication = Impaired  - How often does patient participate in communication exchanges without additional assistance? = 26-49% of the time  - How often does patient convey simple meaningful msgs related to routine daily activities in low-demand situations? = 26-49% of the time  - How often does patient participate in short, structured, meaningful conversations in low-demand situations? = 26-49% of the time  - How often does patient convey complex meaningful msgs in low-demand situations? = 26-49% of the time  - How often does patient convey simple meaningful msgs related to routine daily activities in high-demand situations? = 0-25% of the time  - How often does patient participate in short, structured, meaningful conversations in high-demand situations? = 0-25% of the time  - How often does patient convey complex meaningful msgs in high-demand situations? = 0-25% of the time     Cognition Pragmatic Skills = Impaired     Problem Solving = Impaired  - How often does patient demonstrate adequate cog/com skills to complete routine/simple living tasks? = 0-25% of the time  - How often does patient demonstrate adequate cog/com skills to complete age-appropriate common daily living tasks? = 0-25% of the time  - How often does patient demonstrate adequate cog/com skills to complete ageappropriate complex living tasks? = 0-25% of the time  - How often does patient function safely without additional assistance/supervision due to cognitive deficits? = 0-25% of the time     Memory = Impaired  - Short Term Memory = < 25%  - Long Term Memory = < 25%  - Procedural Memory = < 25%     Executive Function = Impaired:  - Oriented To = Person     Swallow Status:  - What % of patients daily diet is delivered via non-oral means to maintain nutrition/hydration? = None  - How often does patient exhibit difficulty with oral containment/secretion management? = 0-25% of the time  - How often does patient require supervision/assistance at mealtime d/t swallow safety? = 0-25% of the time     Cognition Global Deterioration Scale (GDS) = 5/7 - Mod-Sev cognitive decline     Discharge Recommendations and Status:  * Oral Intake:  - What modified diet is recommended for the patient to swallow solids safely? = Minced & moist  - What modified liquid is recommended for the patient to swallow safely? = Thin liquid (No modifications)  - Compensatory Strategies/Positions: Upright and alert for all meals  RD should be consulted for poor intake of solids exacerbated by pt agitation during meals  Oral care 3x daily to reduce oral pathogens and potentail risk for nosocomial pneumonia resulting from aspiration  Discharge Recommendations: To facilitate optimal cognitive-communicative performance, the following strategies are recommended: eye contact with patient to facilitate communication, appropriate redirection w/patient, concrete, one step directions by speaker to increase comprehension, consistent words/verbal directions to increase comprehension, visual aids to increase orientation/decrease wandering, topic cue by speaker to facilitate socialization skills, structured, familiar environment to facilitate performance/responses, short, direct comments to facilitate followthrough and task segmentation to facilitate performance        Therapy Follow Up Established/Trained = Restorative Communication Program Communication Program Established / Trained: Pt's ability to comprehend language improves with use of pictures/visuals and written instructions        Prognosis to Maintain CLOF = Good with consistent staff follow-through     ST D/C Reason: Achieved Highest Practicable Level       Labs and testing performed during stay: Most recent lab results as below:     * CBC with diff (11/3/2022):   HEMOGLOBIN 7 8 g/dL 11 5-14 5 L Final         HEMATOCRIT 22 4 % 35 0-43 0 L Final         WBC 10 8 thou/cmm 4 0-10 0 H Final         RBC 2 39 mill/cmm 3 70-4 70 L Final         PLATELET COUNT 175 thou/cmm 140-350  Final         MPV 9 1 fL 7 5-11 3  Final         MCV 94 fL   Final         MCH 32 5 pg 26 0-34 0  Final         MCHC 34 6 g/dL 32 0-37 0  Final         RDW 17 3 % 12 0-16 0 H Final         DIFFERENTIAL TYPE AUTO    Final         ABSOLUTE NEUT 7 3 thou/cmm 1 8-7 8  Final         ABSOLUTE LYMPH 2 2 thou/cmm 1 0-3 0  Final         ABSOLUTE MONO 1 1 thou/cmm 0 3-1 0 H Final         ABSOLUTE EOS 0 1 thou/cmm 0 0-0 5  Final         ABSOLUTE BASO 0 1 thou/cmm 0 0-0 1  Final         NEUTROPHILS 67 %   Final         LYMPHOCYTES 20 %   Final         MONOCYTES 11 %   Final         EOSINOPHILS 1 %   Final         BASOPHILS 1 %   Final         * CMP (11/3/2022):   GLUCOSE 66 mg/dL 65-99  Final         BUN 19 mg/dL 7-25  Final         CREATININE 1 17 mg/dL 0 40-1 10 H Final         SODIUM 143 mmol/L 135-145  Final         POTASSIUM 3 9 mmol/L 3 5-5 2  Final         CHLORIDE 112 mmol/L 100-109 H Final         CARBON DIOXIDE 26 mmol/L 23-31  Final         CALCIUM 7 9 mg/dL 8 5-10 1 L Final         ALKALINE PHOSPHATASE 151 U/L  H Final         ALBUMIN 2 6 g/dL 3 5-4 8 L Final         BILIRUBIN,TOTAL 0 7 mg/dL 0 2-1 0  Final   Use of this assay is not recommended for patients undergoing treatment   with eltrombopag due to the potential for falsely elevated results       PROTEIN, TOTAL 5 1 g/dL 6 3-8 3 L Final          U/L <41 H Final         ALT 26 U/L <56  Final         ANION GAP 5  3-11  Final         eGFRcr 47  >59 L Final         eGFRcr COMMENT (Note)    Final         HISTORY:  Medical Hx: Reviewed, unchanged  Family Hx: Reviewed, unchanged  Soc Hx: Reviewed,  unchanged    ALLERGY: Reviewed, unchanged  Allergies   Allergen Reactions   • Iodine - Food Allergy Hives and Other (See Comments)     cath dye         Discharge Medications: See discharge medication list which was reviewed and signed  Status at time of discharge: Stable    Today's Visit: November 2, 2022    Subjective: Patient did not engage verbally on this visit  Vitals:T97 8F -P80 -R18 BP: 125/70 SpO2: 97% RA  Weight: 144 3 lbs (11/2/2022) <= 145 4 lbs (10/26/2022) <= 143 0 lbs (10/12/2022)    Review of Systems   Unable to perform ROS: Dementia (Dysarththria)       Exam: Physical Exam  Vitals and nursing note reviewed  Constitutional:       General: She is not in acute distress  Appearance: Normal appearance  She is normal weight  She is not ill-appearing, toxic-appearing or diaphoretic  Comments: Frail stature  HENT:      Head: Normocephalic and atraumatic  Right Ear: Tympanic membrane, ear canal and external ear normal  There is no impacted cerumen  Left Ear: Tympanic membrane, ear canal and external ear normal  There is no impacted cerumen  Nose: Nose normal  No congestion or rhinorrhea  Mouth/Throat:      Mouth: Mucous membranes are moist       Pharynx: Oropharynx is clear  No oropharyngeal exudate or posterior oropharyngeal erythema  Comments: Edentulous  Eyes:      General: No scleral icterus  Right eye: No discharge  Left eye: No discharge  Conjunctiva/sclera: Conjunctivae normal       Pupils: Pupils are equal, round, and reactive to light  Comments: Wears glasses  Neck:      Vascular: No carotid bruit  Cardiovascular:      Rate and Rhythm: Normal rate and regular rhythm  Heart sounds: Normal heart sounds  No murmur heard  No friction rub  No gallop     Pulmonary:      Effort: Pulmonary effort is normal  No respiratory distress  Breath sounds: Normal breath sounds  No stridor  No wheezing, rhonchi or rales  Chest:      Chest wall: No tenderness  Abdominal:      General: Bowel sounds are normal  There is no distension  Palpations: Abdomen is soft  There is no mass  Tenderness: There is no abdominal tenderness  There is no right CVA tenderness, left CVA tenderness, guarding or rebound  Hernia: No hernia is present  Comments: Ave meal completion: 25-50% with multiple declined meals   Genitourinary:     Comments: Non distended bladder  Incontinent  Daily BM: soft alternating with loose stools  Voids 3-4 x per day as documented  Musculoskeletal:         General: Swelling present  No tenderness, deformity or signs of injury  Cervical back: Normal range of motion and neck supple  No rigidity or tenderness  No muscular tenderness  Right lower leg: No edema  Left lower leg: No edema  Comments: Non ambulatory - wheelchair bound  Trace B/L LE swelling  Lymphadenopathy:      Cervical: No cervical adenopathy  Skin:     General: Skin is warm  Capillary Refill: Capillary refill takes less than 2 seconds  Coloration: Skin is not jaundiced or pale  Findings: Bruising present  No erythema, lesion or rash  Comments: Left inner upper thigh (POA)  Resolving DTI to B/L gluteal areas: dry and scabbed  Neurological:      Mental Status: She is alert  Mental status is at baseline  Comments: Dysarthria  Patient can respond verbally but slow and sometimes difficult to understand  Psychiatric:         Mood and Affect: Mood normal          Behavior: Behavior normal       Comments: Somnolent  Did not engage on this visit         Problem List Follow-up Recommendations:    Severe protein-calorie malnutrition ([address] )  Continues with poor appetite: 0-25% on the last 5 days  BMI Findings: 30 2 kg/m2 (11/2/2022) <= 30 4 kg/m2 (10/26/2022) <= 29 8 kg/m2 (10/19/2022)  Continue supplements: Vit D daily/ Vit B complex daily/ House shakes TID  Continue Enhanced diet with Mechanical soft texture: edentulous  Continue weekly weight while in STR     Chronic systolic heart failure (HCC)  Wt Readings from Last 3 Encounters:  10/12/22 63 9 kg (140 lb 14 oz)  10/12/22 63 9 kg (140 lb 14 oz)  08/31/22 64 8 kg (142 lb 13 7 oz)  Admission wt in STR: 143 0 lbs (10/12/2022)  Current wt: 144 3 lbs (11/2/2022) <= 145 4 lbs (10/26/2022) <= 142 4 lbs (10/19/2022)  Euvolemic on assessment  Trace B/L Le swelling   Not on diuretic therapy  Continue weekly weight check      Seizure disorder (HCC)  No reported seizure since admitted to [facility]  Continue home meds: Phenytoin ER 100mg 3 x weekly (MWF) / Lamotrigine 150mg Q12 hours  Atrial fibrillation (HCC)  Stable  BP range (10/12/2022 to 11/2/2022) = 109/51 to 149/75  HR range (10/12/2022 to 11/2/2022) = 57 to 65/min   Continue ASA 81mg daily/ Amlodipine 5mg daily     Stage 3b Chronic kidney disease ([facility] [address] )  Renal functions elevated (11/3/2022): Crea: 1 17/ BUN: 19 / eGFR: 47  = likely from poor meal / fluid intake  Continue to avoid hypotensive episodes and use of nephrotoxic medications  At risk for hydration concerns related to poor oral fluid/meal intake     Ambulatory dysfunction  Discharged from PT/OT/ST today (11/2/2022)  Nursing report 2nd episode of fall since admission to STR  Recent XRAY to B/L Hips/ Pelvis and RLE (11/1/2022) = Negative  Continue fall precaution       Suspected DTI  Site: B/L gluteal areas  Resolving: dry scabbed and no signs of infection  Continue Z-guard BID + PRN  Continue ProSource 30ml BID  Continue Pressure relieving mattress  Turn and reposition patient as often when in be    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  Continue Prednisone 7 5mg daily/ Famotidine 20mg BID    Hypothyroidism  TSH WNL (10/14/2022) = 0 79  Continue Levothyroxine 50mcg daily    COVID - 19 virus detected  Rapid test on 11/2/2022: POSITIVE  Generally asymptomatic except for fatigue  Started on the following:  * Lagevrio 800mg Q12 hours x 5 days (to start on 11/3/2022)  * Vit C 500mg daily x 7 days  * Zinc 50mg daily x 7 days  * Duo-neb Q8 hours PRN x 5 days (SOB/ wheezing)  Continue Vit D 1000 units daily  V/S Q shift x 7 days    Anemia  Sharp drop on Hbg/Hct level today (11/3/2022): 7 8 => 12 3 (10/14/2022)  No known or reported bleeding episodes  Noted increasing bruising to B/L LE and (+3) RLE edema (new findings 11/3/2022)  Will send to ER for further evaluation and management      CURRENT MEDICATION LIST IN [facility]:    Current Outpatient Medications:   •  acetaminophen (TYLENOL) 500 mg tablet, Take 1,000 mg by mouth 2 (two) times a day, Disp: , Rfl:   •  allopurinol (ZYLOPRIM) 100 mg tablet, Take 100 mg by mouth daily, Disp: , Rfl:   •  amLODIPine (NORVASC) 5 mg tablet, Take 1 tablet (5 mg total) by mouth daily Hold for systolic blood pressure below 130, Disp: 30 tablet, Rfl: 0  •  ascorbic acid (VITAMIN C) 250 MG tablet, Take 500 mg by mouth daily = 7 days (11/3-9/2022), Disp: , Rfl:   •  aspirin (ECOTRIN LOW STRENGTH) 81 mg EC tablet, Take 81 mg by mouth every other day, Disp: , Rfl:   •  atorvastatin (LIPITOR) 10 mg tablet, Take 10 mg by mouth daily, Disp: , Rfl:   •  b complex vitamins capsule, Take 1 capsule by mouth daily, Disp: , Rfl:   •  cholecalciferol (VITAMIN D3) 1,000 units tablet, Take 1,000 Units by mouth daily, Disp: , Rfl:   •  famotidine (PEPCID) 20 mg tablet, Take 20 mg by mouth 2 (two) times a day, Disp: , Rfl:   •  ipratropium-albuterol (DUO-NEB) 0 5-2 5 mg/3 mL nebulizer solution, Take 3 mL by nebulization every 8 (eight) hours as needed = for 5 days only (until 11/6/2022), Disp: , Rfl:   •  lamoTRIgine (LaMICtal) 150 MG tablet, Take 250 mg by mouth 2 (two) times a day, Disp: , Rfl:   •  levothyroxine 50 mcg tablet, Take 50 mcg by mouth daily, Disp: , Rfl:   •  molnupiravir 200 mg capsule, Take 800 mg by mouth every 12 (twelve) hours = from 11/3-7/2022 COVID-19, Disp: , Rfl:   •  Nutritional Supplements (ProSource No Carb) LIQD, Take 30 mL by mouth 2 (two) times a day, Disp: , Rfl:   •  phenytoin (DILANTIN) 100 mg ER capsule, Take 100 mg by mouth 3 (three) times a week = M/W/F, Disp: , Rfl:   •  predniSONE 1 mg tablet, Take 7 5 mg by mouth daily, Disp: , Rfl:   •  QUEtiapine (SEROquel) 25 mg tablet, Take 12 5 mg by mouth 2 (two) times a day, Disp: , Rfl:   •  zinc gluconate 50 mg tablet, Take 50 mg by mouth daily = for 7 days (COVID) (11/2-8/2022), Disp: , Rfl:       Discharge Statement:  * Spent more than 30 minutes discharging the patient; greater than 50% spent on physical examination of patient, answering questions, discussion of plan of care, recommendations and providing post discharge instructions  Additional time spend on other discharge related activities including documentation  Please note:  Voice-recognition software may have been used in the preparation of this document  Occasional wrong word or "sound-alike" substitutions may have occurred due to the inherent limitations of voice recognition software  Interpretation should be guided by context Jacklynn Goodell  11/3/91722:04 PM

## 2022-11-03 NOTE — ED NOTES
Pts son is at bedside  Pt was updated on situation thus far        Claudeen Allis A Robel  11/03/22 [id number]

## 2022-11-03 NOTE — ED NOTES
Patient still does not have working line after multiple RN attempts with ultrasound  RN [id number] went in for ultrasound attempt but patient is extremely aggressive and combative and swinging at RNs  Dr Hardy Carvajal made aware  Will administer medications and wait for zyprexa to kick in before 5th iv attempt        Ayaan Goddard RN  11/03/22 [id number]

## 2022-11-03 NOTE — PROGRESS NOTES
[facility]  [address]  [phone]  Monika [address]on [address]  POS: 31: SNF/Short Term Rehab      NAME: Lawrence Quinn  AGE: 76 y o  SEX: female  DATE OF ADMISSION: OCTOBER 12, 2022  DATE OF DISCHARGE: November 2, 2022  DISCHARGE DISPOSITION: TO TRANSITION TO [facility]    Reason for admission: Patient was admitted from [facility] for rehabilitation after hospitalization for ambulatory dysfunction and deconditioning  This is a 38-year-old female patient currently admitted at [facility] (10/12/2022  To present) following discharge from acute care hospitalization [facility]) with Dx of Failure to Thrive in Adult, Severe Protein Calorie Malnutrition, Hypokalemia  Admission Diagnoses: Severe Protein Calorie Malnutrition  Discharge Diagnoses:     * Ambulatory dysfunction with deconditioning  * Severe Protein  Calorie Malnutrition  * Chronic Systolic CHF  * Paroxysmal Atrial Fibrillation  * Seizures  * 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 for rehabilitation services due to ambulatory dysfunction and deconditioning  Patient received 24/7 SNF supportive care, PT/OT/ST services  Patient To date, patient stay in [facility] has been ***  Significant events during the stay include ***  Per , patient is scheduled for discharge to *** on ***       Therapy Progress Note: Per PT/ OT progress note, patient is:    PT Progress note:  * Bed Mobility:  - Roll left and right = Dependent  - Sit to lying = Dependent  - Lying to sitting on side of bed = Dependent    * Transfers:  - Sit to stand = Dependent  - Chair/bed-to-chair transfer = Dependent  - Toilet transfer = Dependent  - Car transfer = Dependent    * Ambulation:  - Walk 10 feet = Dependent  - Distance = 0 feet  - Assistive Device = No AD  - Assistance Needed = patient is unable  - Walk 50 feet with Two Turns = Dependent  - Walk 150 feet = Dependent:  - Walking 10 feet on uneven surfaces = Dependent    * Stairs/Curbs:  - 1 step (curb) = Dependent  - 4 steps = Dependent  - 12 steps = Dependent    * W/C Mobility:  - Resident uses a wheelchair and/or scooter? = No    * Other Picking up object = Dependent    Mobility Performance Raw Score (ranges from 15 - 90; 90 being the highest function) = 15    Assessment and Summary of Skilled Services Skilled Interventions Provided: Therapeutic exercises, therapeutic activities, neuromuscular re-education  Patient Progress & Response to Treatment: Patient has reached maximum potential with skilled services  Cognitive and behavioral problems limited pt to consistently participate in sessions  Team Communication/Collaboration: Pt discussed in rehab meetings and huddles  Pain:  - Can the patient Communicate pain? = No  - Test / LE Strength Five Times  - Sit to Stand = 0   - Patient is Unable to complete without physical assistance  - Pain Assessment: Pain Indicators for patients unable to communicate pain  - Breathing: Independent of vocalization = Normal  - Negative Vocalization = None  - Facial expression = Smiling or inexpressive  - Body Language = Relaxed  - Consolability = No Need to console  - What relieves pain? = Remaining at rest  - What exacerbates pain? = Prolonged Activity    Discharge Recommendations and Status Recommendations: Restorative ROM with am/pm care  Therapy Follow Up Established/Trained = Restorative Range of Motion Program Range of Motion Program Established / Trained: Written program issued       Prognosis to Maintain CLOF = Good with consistent staff follow-through       OT Progress note:  * Eating = Partial/moderate assistance  * Eating - P  Mod detail = Partial Assistance    * Hygiene:  - Oral hygiene = Partial/moderate assistance  - Oral hygiene - P  Mod detail = Partial Assistance  - Toileting hygiene = Substantial/maximal assistance    * Transfers:  - Toilet transfer = Substantial/maximal assistance    * Bathing:  - Shower/bathe self = Substantial/maximal assistance    * Dressing:  - Upper body dressing = Substantial/maximal assistance  - Lower body dressing = Substantial/maximal assistance  - Putting on/taking off footwear = Substantial/maximal assistance    Self Care Performance Raw Score (ranges from 7 - 42; 42 being the highest function) = 16    Assessment and Summary of Skilled Services Skilled Interventions Provided: Skilled interventions include therapeutic activities, self care training, cotreatment interventions with PT and ST to facilitate ability to follow directions, attend to task to increase participation in daily routines  Patient Progress & Response to Treatment: Patient has demonstrated minimal progress toward goals  Patient requires max A with all ADL routines due to both physical and cognitive deficits impacting ability tofollow 1 step directions and sequence tasks  Patient with variable responses to verbal and visual prompts, at times compliant, at times combative impacting overall progress and participation in structured tasks  Patient has reached max potential from skilled OT services at this time  Patient will remain in LTC upon dc with 24 hour caregivers available  Team Communication/Collaboration: Consultation with therapists to facilitate patient's highest level of functional independence, Collaborated with team regarding patient’s discharge/transition planning, Functional skills reviewed with team members and Participated in care planning meeting  Pain:  - Can the patient Communicate pain? = Yes  - Test/UE Strength Right Arm Curl Test = Not Tested (flaccid RUE)   - Pain Intensity = 4/10  - Frequency = Intermittent  - Location: B/ L feet   L thigh( lateral)  - What relieves pain? = Remaining at rest  - What exacerbates pain? = Movement    Discharge Recommendations and Status Recommendations: 24 hour care  Therapy Follow Up Established/Trained = Restorative ADL Program, Restorative Range of Motion Program ADL Program Established / Trained: see RNP Range of Motion Program Established / Trained: see RNP    Prognosis to Maintain CLOF = Good with consistent staff follow-through      ST Progress note:  * Prior Living Environment = Patient lived at home with others  * Prior Cognitive Assistance = Daily SUP (may be left alone 6-8 hrs or more)    * Discharge Location = Patient discharged to reside in this LTC facility  Assistance/Support to be Provided = AM assistance/caregiver available,PM assistance/caregiver available    Skilled Interventions Provided: Tasks to facilitate expressive and receptive linguistic skills, analysis of response to skilled techniques, AAC training and cueing hierarchy training, modeling with low tech AAC methods, graded cueing, education regarding compensatory strategies and overall cognitive communication skills to improve safety with independence provided to staff and family  Patient Progress & Response to Treatment: Pt progress has been limited d/t severity level, hearing impairment, as well as a high level of agitation occurring frequently during sessions d/t pain/discomfort and poor comprehension/orientation  At times, pt is unable to attend to attempts to redirect her or follow directions and is unable to recognize that others are not able to understand her  Staff carryover with low tech AAC boards provided has been inconsistent      Team Communication/Collaboration: Participated in care planning meeting, Functional skills reviewed with team members and Treatment results communicated to Interdisciplinary Team      Objective Tests - Results and Interpretation:   * Auditory Comprehension = Impaired  - How often does patient function safely without additional assistance/supervision due to comprehension deficits? = 26- 49% of the time  - How often does patient participate in communication exchanges without additional assistance? = 26- 49% of the time  - How often does patient understand simple msgs/convo related to routine daily activities in lowdemand situations? = 26-49% of the time  - How often does patient understand complex messages as expected for age in low-demand situations? = 26-49% of the time  - How often does patient understand simple msgs/convo related to routine daily activities in high-demand situations? = 0-25% of the time  - How often does patient understand complex messages as expected for age in high-demand situations? = 0-25% of the time    Reading Comprehension = Impaired:  - How often does patient comprehend basic sight words/phrases in everyday context? = 50-75% of the time  - How often does patient comprehend simple sentences that contain familiar vocabulary? = 50-75% of the time  - How often does patient comprehend simple written instructions/directions? = 50-75% of the time  - How often does patient comprehend lengthy material with familiar vocabulary? = 0-25% of the time  - How often does patient comprehend lengthy material with complex content/vocab? = 0-25% of the time  - How often does patient function safely without assistance/supervision due to reading deficits? = 26-49% of the time    Verbal Language Skills = Impaired:  - How often does patient exhibit expression difficulties noticeable or distracting to the listener? = 76-90% of the time  - How often does patient produce simple spoken words and phrases that are meaningful? = 26-49% of the time  - How often does patient participate in communication exchanges without additional assistance? = 26-49% of the time  - How often does patient produce verbal messages with appropriate form in low-demand situations? = 26-49% of the time  - How often does patient produce verbal messages with appropriate content in low-demand situations? = 26-49% of the time  - How often does patient produce verbal messages with appropriate form in high-demand situations? = 0-25% of the time  - How often does patient produce verbal messages with appropriate content in high-demand situations? = 0- 25% of the time    Written Expression = Impaired:  - Multi-Modality Multi-Modality Communication = Impaired  - How often does patient participate in communication exchanges without additional assistance? = 26-49% of the time  - How often does patient convey simple meaningful msgs related to routine daily activities in low-demand situations? = 26-49% of the time  - How often does patient participate in short, structured, meaningful conversations in low-demand situations? = 26-49% of the time  - How often does patient convey complex meaningful msgs in low-demand situations? = 26-49% of the time  - How often does patient convey simple meaningful msgs related to routine daily activities in high-demand situations? = 0-25% of the time  - How often does patient participate in short, structured, meaningful conversations in high-demand situations? = 0-25% of the time  - How often does patient convey complex meaningful msgs in high-demand situations? = 0-25% of the time    Cognition Pragmatic Skills = Impaired    Problem Solving = Impaired  - How often does patient demonstrate adequate cog/com skills to complete routine/simple living tasks? = 0-25% of the time  - How often does patient demonstrate adequate cog/com skills to complete age-appropriate common daily living tasks? = 0-25% of the time  - How often does patient demonstrate adequate cog/com skills to complete ageappropriate complex living tasks? = 0-25% of the time  - How often does patient function safely without additional assistance/supervision due to cognitive deficits? = 0-25% of the time    Memory = Impaired  - Short Term Memory = < 25%  - Long Term Memory = < 25%  - Procedural Memory = < 25%    Executive Function = Impaired:  - Oriented To = Person    Swallow Status:  - What % of patients daily diet is delivered via non-oral means to maintain nutrition/hydration? = None  - How often does patient exhibit difficulty with oral containment/secretion management? = 0-25% of the time  - How often does patient require supervision/assistance at mealtime d/t swallow safety? = 0-25% of the time    Cognition Global Deterioration Scale (GDS) = 5/7 - Mod-Sev cognitive decline    Discharge Recommendations and Status:  * Oral Intake:  - What modified diet is recommended for the patient to swallow solids safely? = Minced & moist  - What modified liquid is recommended for the patient to swallow safely? = Thin liquid (No modifications)  - Compensatory Strategies/Positions: Upright and alert for all meals  RD should be consulted for poor intake of solids exacerbated by pt agitation during meals  Oral care 3x daily to reduce oral pathogens and potentail risk for nosocomial pneumonia resulting from aspiration  Discharge Recommendations: To facilitate optimal cognitive-communicative performance, the following strategies are recommended: eye contact with patient to facilitate communication, appropriate redirection w/patient, concrete, one step directions by speaker to increase comprehension, consistent words/verbal directions to increase comprehension, visual aids to increase orientation/decrease wandering, topic cue by speaker to facilitate socialization skills, structured, familiar environment to facilitate performance/responses, short, direct comments to facilitate followthrough and task segmentation to facilitate performance  Therapy Follow Up Established/Trained = Restorative Communication Program Communication Program Established / Trained: Pt's ability to comprehend language improves with use of pictures/visuals and written instructions       Prognosis to Maintain CLOF = Good with consistent staff follow-through    ST D/C Reason: Achieved Highest Practicable Level       Labs and testing performed during stay: Most recent lab results as below:   ***    HISTORY:  Medical Hx: Reviewed, unchanged  Family Hx: Reviewed, unchanged  Soc Hx: Reviewed,  unchanged    ALLERGY: Reviewed, unchanged  Allergies   Allergen Reactions   • Iodine - Food Allergy Hives and Other (See Comments)     cath dye         Discharge Medications: See discharge medication list which was reviewed and signed  Status at time of discharge: Stable    Today's Visit: 11/3/549661:57 AM    Subjective:***    Vitals:***   Weight: ***    Review of Systems    Exam: Physical Exam    Discussion with patient/family and further instructions:  -Fall precautions  -Aspiration precautions  -Bleeding precautions  -Monitor for signs/symptoms of infection  -Medication list was reviewed and signed  -DME form was completed      Follow-up Recommendations:     * Please follow-up with your primary care physician within 7-10 days of discharge to review medication changes and current status  The family/ patient needs to set-up an appointment for [facility] with PCP ***  on ***  at ***   Please make every effort to attend this appointment  Should there be a change in your medical condition and an earlier appointment is needed please call the number provided above  * Recommend PT/OT service to continue strengthening, gait, balance and overall functional independence improvement gained during in-patient rehabilitation  * Recommend VNA to assist in medication management and other skilled nursing needs during transition to home  Problem List Follow-up Recommendations:    No problem-specific Assessment & Plan notes found for this encounter          CURRENT MEDICATION LIST IN ***:    Current Outpatient Medications:   •  allopurinol (ZYLOPRIM) 100 mg tablet, Take 100 mg by mouth daily, Disp: , Rfl:   •  amLODIPine (NORVASC) 5 mg tablet, Take 1 tablet (5 mg total) by mouth daily Hold for systolic blood pressure below 130, Disp: 30 tablet, Rfl: 0  •  aspirin (ECOTRIN LOW STRENGTH) 81 mg EC tablet, Take 81 mg by mouth every other day, Disp: , Rfl:   •  atorvastatin (LIPITOR) 10 mg tablet, Take 10 mg by mouth daily, Disp: , Rfl:   •  b complex vitamins capsule, Take 1 capsule by mouth daily, Disp: , Rfl:   •  cholecalciferol (VITAMIN D3) 1,000 units tablet, Take 1,000 Units by mouth daily, Disp: , Rfl:   •  famotidine (PEPCID) 20 mg tablet, Take 20 mg by mouth 2 (two) times a day, Disp: , Rfl:   •  lamoTRIgine (LaMICtal) 200 MG tablet, Take 250 mg by mouth 2 (two) times a day, Disp: , Rfl:   •  levothyroxine 50 mcg tablet, Take 50 mcg by mouth daily, Disp: , Rfl:   •  phenytoin (DILANTIN) 100 mg ER capsule, Take 100 mg by mouth daily Monday, Wednesday & Friday 100mg Tuesday, Thursday, Saturday and Sunday- 200mg, Disp: , Rfl:   •  predniSONE 1 mg tablet, Take 7 5 mg by mouth daily, Disp: , Rfl:   •  QUEtiapine (SEROquel) 25 mg tablet, Take 1 tablet (25 mg total) by mouth daily at bedtime, Disp: 30 tablet, Rfl: 0      Discharge Statement:  * Spent more than 30 minutes discharging the patient; greater than 50% spent on physical examination of patient, answering questions, discussion of plan of care, recommendations and providing post discharge instructions  Additional time spend on other discharge related activities including documentation  Please note:  Voice-recognition software may have been used in the preparation of this document  Occasional wrong word or "sound-alike" substitutions may have occurred due to the inherent limitations of voice recognition software  Interpretation should be guided by waylon Marlow  11/3/919728:57 AM

## 2022-11-03 NOTE — ED NOTES
RN unsuccessful after multiple attempts at IV access at this time   A 2nd RN to attempt ultrasound at this time      Romelia Plascencia, RN  11/03/22 [id number]

## 2022-11-04 NOTE — ASSESSMENT & PLAN NOTE
· ABLA due to swelling and bleeding? vs nutritional deficiency with hx of poor po intake  · Imaging showing soft tissue swelling  · S/p 1 unit pRBC improved to 8 9  · Check folate, b12, and tsh

## 2022-11-04 NOTE — UTILIZATION REVIEW
Initial Clinical Review    Admission: Date/Time/Statement:   Admission Orders (From admission, onward)     Ordered        11/03/22 1849  INPATIENT ADMISSION  Once                      Orders Placed This Encounter   Procedures   • INPATIENT ADMISSION     Standing Status:   Standing     Number of Occurrences:   1     Order Specific Question:   Level of Care     Answer:   Med Surg [16]     Order Specific Question:   Estimated length of stay     Answer:   More than 2 Midnights     Order Specific Question:   Certification     Answer:   I certify that inpatient services are medically necessary for this patient for a duration of greater than two midnights  See H&P and MD Progress Notes for additional information about the patient's course of treatment  ED Arrival Information     Expected   -    Arrival   11/3/2022 14:05    Acuity   Emergent            Means of arrival   Ambulance    Escorted by   [facility] ([address])    Service   Hospitalist    Admission type   Emergency            Arrival complaint   leg swollen           Chief Complaint   Patient presents with   • Leg Swelling     Pt is coming in with R Leg Swelling from [address]  Staff states the sxs just arised today but on presentation looks like it has been ongoing for quite some time  R Leg is swollen throughout and upper R leg has more discoloration and some sweeping  Ecchymosis noted on medial R  Ankle and throughout upper R Leg        Initial Presentation: 76 y o  female presents to ED via  EMS  From SNF  Increasing right leg pain and swelling, possibly fell  States  Symptoms started the day of admission, seems  Has been  Going on for some time  R Leg is swollen throughout and upper R leg has more discoloration and some sweeping  Ecchymosis noted on medial R  Ankle and throughout upper R Leg )  Diagnosed with COVID the day prior to this    In the ED she was found have proximal fibular and malleolar fractures with persistent ambulatory dysfunction prompting admission  Crying, complaining of pain in ED  PMH  Is  Seizure disorder, intracranial bleeding, CHF, HTN, PAF and memory loss  Recently hospitalized  For failure to thrive and discharged to SNF  Labs reveal hemoglobin  7 2 and transfused 1 UPRBC  Admit Ip with  Fracture RLE  Acute blood loss anemia, COVID  19 virus and  Plan is  PT/OT, monitor labs,  Monitor respiratory status,   Started on  molnupiravir at  SNF, hold for now and continue home  Meds  Patient does have blood loss anemia with history of intracranial bleeding  However given further sedentary state with fractures, at high risk for VTE and benefit of heparin outweighs any risk at this time      Date:   11/4       Day 2:   Ortho consult  No surgical intervention for ankle  Remain NWB  RLE in CAM boot, remove for skin checks and hygiene  Continue pain control as needed  Wait  New  X rays   To R/O left femur fracture  Needs PT/OT  Mass with swelling and ecchymosis noted over left thigh        ED Triage Vitals   Temperature Pulse Respirations Blood Pressure SpO2   11/03/22 1410 11/03/22 1410 11/03/22 1410 11/03/22 1410 11/03/22 1410   98 5 °F (36 9 °C) 69 18 (!) 104/48 97 %      Temp Source Heart Rate Source Patient Position - Orthostatic VS BP Location FiO2 (%)   11/03/22 1410 11/03/22 1556 11/03/22 1410 11/03/22 1410 --   Oral Monitor Lying Right arm       Pain Score       11/03/22 1410       6          Wt Readings from Last 1 Encounters:   11/03/22 63 5 kg (140 lb)     Additional Vital Signs:   96 3 °F (35 7 °C) Abnormal  74 16 130/59 -- 96 % None (Room air) --    11/04/22 0646 96 3 °F (35 7 °C) Abnormal  75 14 120/58 -- -- -- --   11/03/22 2252 96 5 °F (35 8 °C) Abnormal  78 19 132/82 100 -- None (Room air) Lying   11/03/22 2000 96 5 °F (35 8 °C) Abnormal  65 20 122/72 84 -- None (Room air) Lying   11/03/22 1830 -- 88 34 Abnormal  -- -- -- -- --   11/03/22 1800 -- 78 22 105/51 -- 95 % None (Room air) Lying   11/03/22 1705 -- 59 16 -- -- 97 % None (Room air) Lying   11/03/22 1556 -- 57 16 -- -- 93 % None (Room air) Lying   11/03/22 1440 -- -- -- -- -- -- None (Room air) --   11/03/22 1410 98 5 °F (36 9 °C) 69 18 104/48 Abnormal  -- 97 % None (Room air) Lying       Pertinent Labs/Diagnostic Test Results:   XR ankle 3+ vw right   ED Interpretation by Suraj Hwang DO (11/03 1838)   Medial malleolus fracture nondisplaced      Final Result by Pavel Ko MD (11/04 2710)      Nondisplaced fracture of the medial malleolus  Soft tissue swelling  Workstation performed: [id number]         XR hip/pelv 2-3 vws right   ED Interpretation by Suraj Hwang DO (11/03 1542)   No fracture      Final Result by Gen Robertson DO (11/03 1706)      No acute osseous abnormality  Bilateral SI joint fusion  Given findings in the spine, consider ankylosing spondylitis  Cholelithiasis  Workstation performed: [id number]         XR femur 2 views RIGHT   ED Interpretation by Suraj Hwang DO (11/03 1542)   No fracture      Final Result by Gen Robertson DO (11/03 1715)      Negative for right femoral fracture  Lucency right fibular neck suspicious for fracture  Workstation performed: [id number]         XR tibia fibula 2 views RIGHT   ED Interpretation by Suraj Hwang DO (11/03 1542)   Proximal fibular fracture      Final Result by Gen Robertson DO (11/03 1715)      Nondisplaced proximal fibular fracture  Questionable findings for fracture about the ankle  Recommend dedicated coned-down right ankle views  The study was marked in [facility] for immediate notification        Workstation performed: [id number]         VAS lower limb venous duplex study, unilateral/limited    (Results Pending)     Results from last 7 days   Lab Units 11/03/22  2110   SARS-COV-2  Positive*     Results from last 7 days   Lab Units 11/04/22  0536 11/03/22  1658   WBC Thousand/uL 8 66 8  62   HEMOGLOBIN g/dL 7 1* 7 2*   HEMATOCRIT % 21 5* 22 3*   PLATELETS Thousands/uL 149 183   NEUTROS ABS Thousands/µL  --  6 60         Results from last 7 days   Lab Units 11/04/22  0536 11/03/22  1546   SODIUM mmol/L 144 141   POTASSIUM mmol/L 3 9 5 2   CHLORIDE mmol/L 109* 107   CO2 mmol/L 26 26   ANION GAP mmol/L 9 8   BUN mg/dL 17 20   CREATININE mg/dL 1 00 1 06   EGFR ml/min/1 73sq m 53 50   CALCIUM mg/dL 7 8* 7 8*     Results from last 7 days   Lab Units 11/04/22  0536 11/03/22  1546   AST U/L 135*  --    ALT U/L 31 29   ALK PHOS U/L 147* 152*   TOTAL PROTEIN g/dL 5 6* 5 7*   ALBUMIN g/dL 2 5* 2 4*   TOTAL BILIRUBIN mg/dL 0 81 0 78         Results from last 7 days   Lab Units 11/04/22  0536 11/03/22  1546   GLUCOSE RANDOM mg/dL 66 110               Results from last 7 days   Lab Units 11/03/22  1546   PROTIME seconds 13 7   INR  1 05   PTT seconds 23           Results from last 7 days   Lab Units 11/03/22  2110   INFLUENZA A PCR  Negative   INFLUENZA B PCR  Negative   RSV PCR  Negative             ED Treatment:   Medication Administration from 11/03/2022 1405 to 11/03/2022 1955       Date/Time Order Dose Route Action Comments     11/03/2022 1801 OLANZapine (ZyPREXA) IM injection 5 mg 5 mg Intramuscular Given      11/03/2022 1914 OLANZapine (ZyPREXA) IM injection 5 mg 5 mg Intramuscular Given         Present on Admission:  • Atrial fibrillation (HCC)  • Chronic systolic heart failure (HCC)  • COVID-19 virus detected  • Essential hypertension  • Seizure disorder (HCC)  • 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  • Fracture of right lower extremity  • Acute blood loss anemia (ABLA)      Admitting Diagnosis: Acute blood loss anemia [D62]  Swollen leg [M79 89]  Closed fracture of proximal end of right fibula, unspecified fracture morphology, initial encounter [S82 831A]  Nondisplaced fracture of medial malleolus of right tibia, initial encounter for closed fracture [S82 54XA]  Age/Sex: 76 y o  female  Admission Orders:  Scheduled Medications:  acetaminophen, 650 mg, Oral, Q12H  allopurinol, 100 mg, Oral, Daily  amLODIPine, 5 mg, Oral, Daily  ascorbic acid, 500 mg, Oral, Daily  aspirin, 81 mg, Oral, Every Other Day  atorvastatin, 10 mg, Oral, Daily With Dinner  b complex-vitamin C-folic acid, 1 capsule, Oral, Daily  cholecalciferol, 1,000 Units, Oral, Daily  famotidine, 20 mg, Oral, BID  heparin (porcine), 5,000 Units, Subcutaneous, Q8H ANUPAM  lamoTRIgine, 250 mg, Oral, BID  levothyroxine, 50 mcg, Oral, Early Morning  phenytoin, 100 mg, Oral, Once per day on Mon Wed Fri  [START ON 11/5/2022] phenytoin, 200 mg, Oral, Once per day on Sun Tue Thu Sat  predniSONE, 7 5 mg, Oral, Daily  QUEtiapine, 12 5 mg, Oral, BID  zinc sulfate, 220 mg, Oral, Daily      Continuous IV Infusions:     PRN Meds:  ipratropium-albuterol, 3 mL, Nebulization, Q8H PRN        IP CONSULT TO ORTHOPEDIC SURGERY    Network Utilization Review Department  ATTENTION: Please call with any questions or concerns to [phone] and carefully listen to the prompts so that you are directed to the right person  All voicemails are confidential   Manny Paredes all requests for admission clinical reviews, approved or denied determinations and any other requests to dedicated fax number below belonging to the campus where the patient is receiving treatment   List of dedicated fax numbers for the Facilities:  [address] DENIALS (Administrative/Medical Necessity) [phone]   [address] (Maternity/NICU/Pediatrics) [phone]   [address] [facility] [address] [place] [id number] [address] [place] [address] [address] [facility] 134 706 [facility] [phone]

## 2022-11-04 NOTE — ASSESSMENT & PLAN NOTE
Wt Readings from Last 3 Encounters:   11/03/22 63 5 kg (140 lb)   10/12/22 63 9 kg (140 lb 14 oz)   10/12/22 63 9 kg (140 lb 14 oz)     · Note recent admission furosemide was held due to poor intake  · Appears compensated

## 2022-11-04 NOTE — ASSESSMENT & PLAN NOTE
· Acute blood loss anemia with swollen legs likely intramuscular hematoma  · Being transfuse 1 unit PRBC by ED  · Recheck hemoglobin in a m      Results from last 7 days   Lab Units 11/03/22  1658   HEMOGLOBIN g/dL 7 2*

## 2022-11-04 NOTE — ASSESSMENT & PLAN NOTE
· Reportedly tested positive for COVID at facility 11/2/2022  · Started on molnupiravir at nursing facility but will defer for now as patient stable on room air  · Positive, not requiring oxygen  · Monitor, supportive care but not requiring treatment

## 2022-11-04 NOTE — ASSESSMENT & PLAN NOTE
· Recent hospitalization for failure to thrive went to [facility] for rehab  · Reportedly diagnosed with COVID 11/2/2022  · Subsequently fell today and was brought to the hospital where she was found have bruising with right proximal fibula and malleolar fractures  · Consult PT /OT and orthopedics

## 2022-11-04 NOTE — ASSESSMENT & PLAN NOTE
· Reported history of paroxysmal atrial fibrillation not on AV shelly agents  · Also not anticoagulation due to history of intracranial bleeding

## 2022-11-04 NOTE — PLAN OF CARE
Problem: Potential for Falls  Goal: Patient will remain free of falls  Description: INTERVENTIONS:  - Educate patient/family on patient safety including physical limitations  - Instruct patient to call for assistance with activity   - Consult OT/PT to assist with strengthening/mobility   - Keep Call bell within reach  - Keep bed low and locked with side rails adjusted as appropriate  - Keep care items and personal belongings within reach  - Initiate and maintain comfort rounds  - Make Fall Risk Sign visible to staff  - Offer Toileting every 2 Hours, in advance of need  - Initiate/Maintain bed alarm  - Obtain necessary fall risk management equipment: call bell, bed alarm, chair alarm  - Apply yellow socks and bracelet for high fall risk patients  - Consider moving patient to room near nurses station  Outcome: Progressing     Problem: INFECTION - ADULT  Goal: Absence or prevention of progression during hospitalization  Description: INTERVENTIONS:  - Assess and monitor for signs and symptoms of infection  - Monitor lab/diagnostic results  - Monitor all insertion sites, i e  indwelling lines, tubes, and drains  - Monitor endotracheal if appropriate and nasal secretions for changes in amount and color  - [place] appropriate cooling/warming therapies per order  - Administer medications as ordered  - Instruct and encourage patient and family to use good hand hygiene technique  - Identify and instruct in appropriate isolation precautions for identified infection/condition  Outcome: Progressing     Problem: DISCHARGE PLANNING  Goal: Discharge to home or other facility with appropriate resources  Description: INTERVENTIONS:  - Identify barriers to discharge w/patient and caregiver  - Arrange for needed discharge resources and transportation as appropriate  - Identify discharge learning needs (meds, wound care, etc )  - Refer to Case Management Department for coordinating discharge planning if the patient needs post-hospital services based on physician/advanced practitioner order or complex needs related to functional status, cognitive ability, or social support system  Outcome: Progressing     Problem: NEUROSENSORY - ADULT  Goal: Achieves stable or improved neurological status  Description: INTERVENTIONS  - Monitor and report changes in neurological status  - Monitor vital signs such as temperature, blood pressure, glucose, and any other labs ordered   - Monitor for seizure activity and implement precautions if appropriate      Outcome: Progressing  Goal: Remains free of injury related to seizures activity  Description: INTERVENTIONS  - Maintain airway, patient safety  and administer oxygen as ordered  - Monitor patient for seizure activity, document and report duration and description of seizure to physician/advanced practitioner  - If seizure occurs,  ensure patient safety during seizure  - Reorient patient post seizure  - Instruct patient/family to notify RN of any seizure activity including if an aura is experienced  - Instruct patient/family to call for assistance with activity based on nursing assessment  - Administer anti-seizure medications if ordered    Outcome: Progressing     Problem: CARDIOVASCULAR - ADULT  Goal: Maintains optimal cardiac output and hemodynamic stability  Description: INTERVENTIONS:  - Monitor I/O, vital signs and rhythm  - Monitor for S/S and trends of decreased cardiac output  - Administer and titrate ordered vasoactive medications to optimize hemodynamic stability  - Assess quality of pulses, skin color and temperature  - Assess for signs of decreased coronary artery perfusion  - Instruct patient to report change in severity of symptoms  Outcome: Progressing     Problem: METABOLIC, FLUID AND ELECTROLYTES - ADULT  Goal: Electrolytes maintained within normal limits  Description: INTERVENTIONS:  - Monitor labs and assess patient for signs and symptoms of electrolyte imbalances  - Administer electrolyte replacement as ordered  - Monitor response to electrolyte replacements, including repeat lab results as appropriate  - Instruct patient on fluid and nutrition as appropriate  Outcome: Progressing  Goal: Fluid balance maintained  Description: INTERVENTIONS:  - Monitor labs   - Monitor I/O and WT  - Instruct patient on fluid and nutrition as appropriate  - Assess for signs & symptoms of volume excess or deficit  Outcome: Progressing     Problem: SKIN/TISSUE INTEGRITY - ADULT  Goal: Skin Integrity remains intact(Skin Breakdown Prevention)  Description: Assess:  -Perform Dane assessment every shift  -Clean and moisturize skin every day and when soiled  -Inspect skin when repositioning, toileting, and assisting with ADLS  -Assess under medical devices such as Alevyn every day  -Assess extremities for adequate circulation and sensation     Bed Management:  -Have minimal linens on bed & keep smooth, unwrinkled  -Change linens as needed when moist or perspiring  -Avoid sitting or lying in one position for more than 2 hours while in bed  -Keep HOB at 30 degrees     Toileting:  -Offer bedside commode  -Assess for incontinence every 2 hours  -Use incontinent care products after each incontinent episode such as lotion, soap, and water    Activity:  -Mobilize patient 2 times a day  -Encourage activity and walks on unit  -Encourage or provide ROM exercises   -Turn and reposition patient every 2 Hours  -Use appropriate equipment to lift or move patient in bed  -Instruct/ Assist with weight shifting every 1 hour when out of bed in chair  -Consider limitation of chair time 2 hour intervals    Skin Care:  -Avoid use of baby powder, tape, friction and shearing, hot water or constrictive clothing  -Relieve pressure over bony prominences using alevyn  -Do not massage red bony areas    Next Steps:  -Teach patient strategies to minimize risks such as sitting in the same position or remaining moist for too long   -Consider consults to  interdisciplinary teams such as nutrition and wound care if necessary  Outcome: Progressing     Problem: HEMATOLOGIC - ADULT  Goal: Maintains hematologic stability  Description: INTERVENTIONS  - Assess for signs and symptoms of bleeding or hemorrhage  - Monitor labs  - Administer supportive blood products/factors as ordered and appropriate  Outcome: Progressing     Problem: MUSCULOSKELETAL - ADULT  Goal: Maintain or return mobility to safest level of function  Description: INTERVENTIONS:  - Assess patient's ability to carry out ADLs; assess patient's baseline for ADL function and identify physical deficits which impact ability to perform ADLs (bathing, care of mouth/teeth, toileting, grooming, dressing, etc )  - Assess/evaluate cause of self-care deficits   - Assess range of motion  - Assess patient's mobility  - Assess patient's need for assistive devices and provide as appropriate  - Encourage maximum independence but intervene and supervise when necessary  - Involve family in performance of ADLs  - Assess for home care needs following discharge   - Consider OT consult to assist with ADL evaluation and planning for discharge  - Provide patient education as appropriate  Outcome: Progressing  Goal: Maintain proper alignment of affected body part  Description: INTERVENTIONS:  - Support, maintain and protect limb and body alignment  - Provide patient/ family with appropriate education  Outcome: Progressing     Problem: Nutrition/Hydration-ADULT  Goal: Nutrient/Hydration intake appropriate for improving, restoring or maintaining nutritional needs  Description: Monitor and assess patient's nutrition/hydration status for malnutrition  Collaborate with interdisciplinary team and initiate plan and interventions as ordered  Monitor patient's weight and dietary intake as ordered or per policy  Utilize nutrition screening tool and intervene as necessary   Determine patient's food preferences and provide high-protein, high-caloric foods as appropriate  INTERVENTIONS:  - Monitor oral intake, urinary output, labs, and treatment plans  - Assess nutrition and hydration status and recommend course of action  - Evaluate amount of meals eaten  - Assist patient with eating if necessary   - Allow adequate time for meals  - Recommend/ encourage appropriate diets, oral nutritional supplements, and vitamin/mineral supplements  - Assess need for intravenous fluids  - Provide specific nutrition/hydration education as appropriate  - Include patient/family/caregiver in decisions related to nutrition  Outcome: Progressing     Problem: PAIN - ADULT  Goal: Verbalizes/displays adequate comfort level or baseline comfort level  Description: Interventions:  - Encourage patient to monitor pain and request assistance  - Assess pain using appropriate pain scale  - Administer analgesics based on type and severity of pain and evaluate response  - Implement non-pharmacological measures as appropriate and evaluate response  - Consider cultural and social influences on pain and pain management  - Notify physician/advanced practitioner if interventions unsuccessful or patient reports new pain  Outcome: Not Progressing     Problem: Knowledge Deficit  Goal: Patient/family/caregiver demonstrates understanding of disease process, treatment plan, medications, and discharge instructions  Description: Complete learning assessment and assess knowledge base    Interventions:  - Provide teaching at level of understanding  - Provide teaching via preferred learning methods  Outcome: Not Progressing

## 2022-11-04 NOTE — OCCUPATIONAL THERAPY NOTE
Occupational Therapy         Patient Name: Jayme Solis  [id number] Date: 11/4/2022        MD's orders received  Pt pending ortho consult 2* R LE fx  Will continue when cleared by helio Garvin

## 2022-11-04 NOTE — TREATMENT PLAN
Treatment plan clarification    Patient does have blood loss anemia with history of intracranial bleeding  However given further sedentary state with fractures, at high risk for VTE and benefit of heparin outweighs any risk at this time

## 2022-11-04 NOTE — SPEECH THERAPY NOTE
Speech Pathology Bedside Swallow Evaluation:                    SLP RECOMMENDATIONS:         Diet: Level 2 [facility] soft        Liquids: Thin liquids         Medications: As tolerated        Strategies: Upright, feeding assist, slow rate        Summary:  Pt seen for bedside swallow evaluation  Pt with poor alertness throughout session  Pt presents with mild to moderate oral dysphagia and minimal s/s pharyngeal dysphagia  Evaluation limited by alertness  Pt upright sleeping in bed upon arrival  Pt with brief periods of alertness with max stim  Pt trialed with thin liquid via tsp with mild anterior loss and no overt s/s aspiration  Pt unable to coordinate use of straw primarily r/t alertness  Pt trialed with puree with slow, but ultimately functional manipulation of bolus  Pt with decreased alertness and participation as evaluation continued and additional PO trials were held  Pt was seen by ST services at this hospital ~ 3 weeks prior  At that time pt was placed on a Level 2 [facility] Soft/thin liquid diet  Per chart review, pt with limited PO intake in the past  Recommend continuing this diet  Continue dysphagia 2 diet/thin liquids  Recommend ST attempt to see pt with a larger amount and variety of dysphagia 2 foods to ensure diet tolerance  Therapy Prognosis: Guarded   Prognosis considerations: Minimal participation   Frequency: 2-4 f/u visits       Vitals:    11/03/22 2000 11/03/22 2252 11/04/22 0646 11/04/22 0705   BP: 122/72 132/82 120/58 130/59   BP Location: Left arm Left arm     Pulse: 65 78 75 74   Resp: 20 19 14 16   Temp: (!) 96 5 °F (35 8 °C) (!) 96 5 °F (35 8 °C) (!) 96 3 °F (35 7 °C) (!) 96 3 °F (35 7 °C)   TempSrc: Temporal Temporal  Temporal   SpO2:    96%   Weight:       Height:               Consider consult w/:  Nutrition    Goal(s):  Pt will tolerate least restrictive diet w/out s/s aspiration or oral/pharyngeal difficulties       H&P/Admit info/ pertinent provider notes: (PMH noted above)  76 y  o female who resides in a nursing home and who has baseline dementia status post unknown injury resulting in right ankle and proximal fibula fractures  She is not believed to walk at baseline  Note: patient is essentially incoherent and no meaningful history was able to be gathered during our visit  History gathered from prior notes and nursing staff  The nursing home denies any known fall or injury, simply stating they noticed bruising and swelling in the right leg and brought her in to be evaluated  The fractures were noted on imaging and orthopedics was consulted  Medication history notes aspirin use  PMH significant for atrial fibrillation, chronic meningitis, hemorrhagic CVA with resultant right-sided hemiparesis  She has an active COVID 19 infection  Special Studies:  CXR 10/4/22: IMPRESSION:  Lungs clear          Previous VBS:  No     Patient's goal: None stated     Did the pt report pain? No   If yes, was nursing notified/was it addressed?     Reason for consult:  R/o aspiration  Determine safest and least restrictive diet    Precautions:  Aspiration  Airborne precautions     Food allergies:  Iodine    Current diet:  Regular/thin    Premorbid diet:  Level 2 soft/thin    O2 requirements:  RA    Voice/Speech:  No verbal output    Social:  Impaired    Follows commands:  Impaired    Cognitive status:  Lethargic          Items administered:  Puree, thin liquids      Results d/w:  Pt, nursing, physician assistant

## 2022-11-04 NOTE — PROGRESS NOTES
[facility]  Progress Note - Carmen Oneill 1945, 76 y o  female MRN: [id number]  Unit/Bed#: E2 -01 Encounter: [id number]  Primary Care Provider: Nurys Santana MD   Date and time admitted to hospital: 11/3/2022  2:05 PM    * Fracture of right lower extremity  Assessment & Plan  · Recent hospitalization for failure to thrive went to [facility] for rehab  · Reportedly diagnosed with COVID 11/2/2022  · Possible fall, no witness account though suspected  · Ortho recommend supportive care with cam boot  · PT and OT to evaluate  · Possible discharge back to previous facility one stable and evaluated by PT    Acute blood loss anemia (ABLA)  Assessment & Plan  · ABLA due to swelling and bleeding? vs nutritional deficiency with hx of poor po intake  · Imaging showing soft tissue swelling  · S/p 1 unit pRBC improved to 8 9  · Check folate, b12, and tsh    COVID-19 virus detected  Assessment & Plan  · Reportedly tested positive for COVID at facility 11/2/2022  · Started on molnupiravir at nursing facility but will defer for now as patient stable on room air  · Positive, not requiring oxygen  · Monitor, supportive care but not requiring treatment    Seizure disorder [facility])  Assessment & Plan  · Continue lamictal and phenytoin    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  Assessment & Plan  · Continue prednisone 7 5 mg daily    History of intracranial hemorrhage  Assessment & Plan  · History of intracranial hemorrhage with right-sided weakness    Chronic systolic heart failure (HCC)  Assessment & Plan  Wt Readings from Last 3 Encounters:   11/03/22 63 5 kg (140 lb)   10/12/22 63 9 kg (140 lb 14 oz)   10/12/22 63 9 kg (140 lb 14 oz)     · Note recent admission furosemide was held due to poor intake  · Appears compensated    Atrial fibrillation ([address] )  Assessment & Plan  · Reported history of paroxysmal atrial fibrillation not on AV shelly agents  · Also not anticoagulation due to history of intracranial bleeding    Essential hypertension  Assessment & Plan  · Hold norvasc, soft BP        VTE Pharmacologic Prophylaxis:   Pharmacologic: Heparin  Mechanical VTE Prophylaxis in Place: Yes    Patient Centered Rounds: I have performed bedside rounds with nursing staff today  Discussions with Specialists or Other Care Team Provider: Ortho    Education and Discussions with Family / Patient: son on phone    Time Spent for Care: 30 minutes  More than 50% of total time spent on counseling and coordination of care as described above  Current Length of Stay: 1 day(s)    Current Patient Status: Inpatient   Certification Statement: The patient will continue to require additional inpatient hospital stay due to monitor H/h, PT evaluation    Discharge Plan: pending    Code Status: Level 3 - DNAR and DNI      Subjective:   No events overnight  H/H decreased requiring blood transfusion  Not alert and oriented  No fevers overnight  Call and updated son with findings  Objective:     Vitals:   Temp (24hrs), Av 5 °F (35 8 °C), Min:96 3 °F (35 7 °C), Max:97 1 °F (36 2 °C)    Temp:  [96 3 °F (35 7 °C)-97 1 °F (36 2 °C)] 97 1 °F (36 2 °C)  HR:  [59-88] 72  Resp:  [14-34] 16  BP: (105-132)/(51-82) 130/60  SpO2:  [95 %-97 %] 96 %  Body mass index is 29 26 kg/m²  Input and Output Summary (last 24 hours): Intake/Output Summary (Last 24 hours) at 2022 1626  Last data filed at 2022 1230  Gross per 24 hour   Intake 430 ml   Output --   Net 430 ml       Physical Exam:     Physical Exam  Vitals reviewed  Constitutional:       General: She is in acute distress  Appearance: Normal appearance  She is ill-appearing  HENT:      Head: Atraumatic  Mouth/Throat:      Mouth: Mucous membranes are dry  Eyes:      General: No scleral icterus  Conjunctiva/sclera: Conjunctivae normal    Cardiovascular:      Rate and Rhythm: Normal rate and regular rhythm  Heart sounds: Normal heart sounds     Pulmonary: Effort: Pulmonary effort is normal  No respiratory distress  Breath sounds: Decreased breath sounds present  Abdominal:      General: Bowel sounds are normal       Palpations: Abdomen is soft  Tenderness: There is no guarding  Musculoskeletal:         General: Swelling, tenderness and deformity present  Comments: Right ankle and proximal lower leg   Skin:     General: Skin is warm  Findings: Bruising present  Neurological:      Mental Status: She is disoriented  Motor: Weakness (Right-sided, chronic) present  Additional Data:     Labs:    Results from last 7 days   Lab Units 11/04/22  1439 11/04/22  0536 11/03/22  1658   WBC Thousand/uL  --  8 66 8 62   HEMOGLOBIN g/dL 8 9* 7 1* 7 2*   HEMATOCRIT % 27 9* 21 5* 22 3*   PLATELETS Thousands/uL  --  149 183   NEUTROS PCT %  --   --  77*   LYMPHS PCT %  --   --  12*   MONOS PCT %  --   --  10   EOS PCT %  --   --  0     Results from last 7 days   Lab Units 11/04/22  0536   SODIUM mmol/L 144   POTASSIUM mmol/L 3 9   CHLORIDE mmol/L 109*   CO2 mmol/L 26   BUN mg/dL 17   CREATININE mg/dL 1 00   ANION GAP mmol/L 9   CALCIUM mg/dL 7 8*   ALBUMIN g/dL 2 5*   TOTAL BILIRUBIN mg/dL 0 81   ALK PHOS U/L 147*   ALT U/L 31   AST U/L 135*   GLUCOSE RANDOM mg/dL 66     Results from last 7 days   Lab Units 11/03/22  1546   INR  1 05                       * I Have Reviewed All Lab Data Listed Above  * Additional Pertinent Lab Tests Reviewed: All Labs For Current Hospital Admission Reviewed    Imaging:    XR hip/pelv 2-3 vws right    Result Date: 11/3/2022  Impression: No acute osseous abnormality  Bilateral SI joint fusion  Given findings in the spine, consider ankylosing spondylitis  Cholelithiasis  Workstation performed: [id number]     XR femur 2 views RIGHT    Result Date: 11/3/2022  Impression: Negative for right femoral fracture  Lucency right fibular neck suspicious for fracture   Workstation performed: [id number]     XR tibia fibula 2 views RIGHT    Result Date: 11/3/2022  Impression: Nondisplaced proximal fibular fracture  Questionable findings for fracture about the ankle  Recommend dedicated coned-down right ankle views  The study was marked in [facility] for immediate notification  Workstation performed: [id number]     XR ankle 3+ vw right    Result Date: 11/4/2022  Impression: Nondisplaced fracture of the medial malleolus  Soft tissue swelling  Workstation performed: [id number]       Recent Cultures (last 7 days):           Last 24 Hours Medication List:   Current Facility-Administered Medications   Medication Dose Route Frequency Provider Last Rate   • acetaminophen  650 mg Oral Q12H Dawit Chilel, DO     • allopurinol  100 mg Oral Daily Dawit Chilel, DO     • ascorbic acid  500 mg Oral Daily Constance Barlow, DO     • aspirin  81 mg Oral Every Other Day Constance Barlow, DO     • atorvastatin  10 mg Oral Daily With [employer], DO     • b complex-vitamin C-folic acid  1 capsule Oral Daily Constance Barlow, DO     • cholecalciferol  1,000 Units Oral Daily Dawit Chilel, DO     • famotidine  20 mg Oral BID Constance Barlow, DO     • ipratropium-albuterol  3 mL Nebulization Q8H PRN Rosalia Partida MD     • lamoTRIgine  250 mg Oral BID Constance Barlow, DO     • levothyroxine  50 mcg Oral Early Morning Constance Blonadine, DO     • phenytoin  100 mg Oral Once per day on Mon Wed Fri Dawit Chilel, DO     • [START ON 11/5/2022] phenytoin  200 mg Oral Once per day on Sun Tue Thu Sat Dawit Chilel DO     • predniSONE  7 5 mg Oral Daily Constance Blower, DO     • QUEtiapine  12 5 mg Oral BID Constance Blower, DO     • zinc sulfate  220 mg Oral Daily Constance Barlow, DO          Today, Patient Was Seen By: Rosalia Partida    ** Please Note: Dictation voice to text software may have been used in the creation of this document   **

## 2022-11-04 NOTE — ASSESSMENT & PLAN NOTE
· Recent hospitalization for failure to thrive went to [facility] for rehab  · Reportedly diagnosed with COVID 11/2/2022  · Possible fall, no witness account though suspected  · Ortho recommend supportive care with cam boot  · PT and OT to evaluate  · Possible discharge back to previous facility one stable and evaluated by PT

## 2022-11-04 NOTE — PHYSICAL THERAPY NOTE
Physical Therapy Cancellation Note    Patient's Name: Allison Nieves       11/04/22 0805   PT Last Visit   PT Visit Date 11/04/22   Note Type   Note type Cancelled Session   Cancel Reasons Medical status   Additional Comments Pt w/ RLE fx, awaiting ortho consult  Will continue to follow       Carrie Correa, PT, DPT

## 2022-11-04 NOTE — CONSULTS
Orthopedics   Jayme Solis 76 y o  female MRN: [id number]  Unit/Bed#: E2 -01      Chief Complaint:   right ankle fracture    HPI:  76 y  o female who resides in a nursing home and who has baseline dementia status post unknown injury resulting in right ankle and proximal fibula fractures  She is not believed to walk at baseline  Note: patient is essentially incoherent and no meaningful history was able to be gathered during our visit  History gathered from prior notes and nursing staff  The nursing home denies any known fall or injury, simply stating they noticed bruising and swelling in the right leg and brought her in to be evaluated  The fractures were noted on imaging and orthopedics was consulted  Medication history notes aspirin use  PMH significant for atrial fibrillation, chronic meningitis, hemorrhagic CVA with resultant right-sided hemiparesis  She has an active COVID 19 infection  Review Of Systems:   · Skin: Normal  · Neuro: See HPI  · Musculoskeletal: See HPI  · 14 point review of systems unobtainable due to pts condition    Past Medical History:   Past Medical History:   Diagnosis Date   • Disease of thyroid gland    • Hypertension    • Sarcoidosis    • Seizure ([facility] [address] )    • Stroke [facility])        Past Surgical History:   Past Surgical History:   Procedure Laterality Date   • APPENDECTOMY     • BRAIN SURGERY     • EYE SURGERY     • HYSTERECTOMY         Family History:  Family history reviewed and non-contributory  Family History   Problem Relation Age of Onset   • Coronary artery disease Mother    • Hypertension Mother    • Anxiety disorder Son        Social History:  Social History     Socioeconomic History   • Marital status:       Spouse name: None   • Number of children: None   • Years of education: None   • Highest education level: None   Occupational History   • None   Tobacco Use   • Smoking status: Never Smoker   • Smokeless tobacco: Never Used   Vaping Use   • Vaping Use: Never used   Substance and Sexual Activity   • Alcohol use: Not Currently   • Drug use: Never   • Sexual activity: Not Currently   Other Topics Concern   • None   Social History Narrative   • None     Social Determinants of Health     Financial Resource Strain: Not on file   Food Insecurity: No Food Insecurity   • Worried About Running Out of Food in the Last Year: Never true   • Ran Out of Food in the Last Year: Never true   Transportation Needs: No Transportation Needs   • Lack of Transportation (Medical): No   • Lack of Transportation (Non-Medical): No   Physical Activity: Not on file   Stress: Not on file   Social Connections: Not on file   Intimate Partner Violence: Not on file   Housing Stability: Not on file       Allergies:    Allergies   Allergen Reactions   • Iodine - Food Allergy Hives and Other (See Comments)     cath dye             Labs:  0   Lab Value Date/Time    HCT 21 5 (L) 11/04/2022 0536    HCT 22 3 (L) 11/03/2022 1658    HCT 36 6 10/11/2022 0613    HGB 7 1 (L) 11/04/2022 0536    HGB 7 2 (L) 11/03/2022 1658    HGB 11 9 10/11/2022 0613    INR 1 05 11/03/2022 1546    WBC 8 66 11/04/2022 0536    WBC 8 62 11/03/2022 1658    WBC 7 34 10/11/2022 0613       Meds:    Current Facility-Administered Medications:   •  acetaminophen (TYLENOL) tablet 650 mg, 650 mg, Oral, Q12H, Dawit Chilel, DO  •  allopurinol (ZYLOPRIM) tablet 100 mg, 100 mg, Oral, Daily, Dawit Chilel DO  •  amLODIPine (NORVASC) tablet 5 mg, 5 mg, Oral, Daily, Dawit Chilel DO  •  ascorbic acid (VITAMIN C) tablet 500 mg, 500 mg, Oral, Daily, Dawit Chilel DO  •  aspirin (ECOTRIN LOW STRENGTH) EC tablet 81 mg, 81 mg, Oral, Every Other Day, Dawit Chilel DO  •  atorvastatin (LIPITOR) tablet 10 mg, 10 mg, Oral, Daily With Dinner, Dawit Chilel DO  •  b complex-vitamin C-folic acid (NEPHROCAPS) capsule 1 capsule, 1 capsule, Oral, Daily, Dawit Chilel DO  •  cholecalciferol (VITAMIN D3) tablet 1,000 Units, 1,000 Units, Oral, Daily, Beatriz Norris DO Get[place]w  •  famotidine (PEPCID) tablet 20 mg, 20 mg, Oral, BID, Dawit DO [place], 20 mg at 11/03/22 2239  •  heparin (porcine) subcutaneous injection 5,000 Units, 5,000 Units, Subcutaneous, Q8H [facility], Dawit Getachew , 5,000 Units at 11/04/22 8197  •  ipratropium-albuterol (DUO-NEB) 0 5-2 5 mg/3 mL inhalation solution 3 mL, 3 mL, Nebulization, Q8H, Dawit Chilel DO  •  lamoTRIgine (LaMICtal) tablet 250 mg, 250 mg, Oral, BID, Dawit Chilel DO  •  levothyroxine tablet 50 mcg, 50 mcg, Oral, Early Morning, Dawit Chilel DO, 50 mcg at 11/04/22 7246  •  phenytoin (DILANTIN) ER capsule 100 mg, 100 mg, Oral, Once per day on Mon Wed Fri, Dawit Chilel DO  •  [START ON 11/5/2022] phenytoin (DILANTIN) ER capsule 200 mg, 200 mg, Oral, Once per day on Sun Tue Thu Sat, Dawit Chilel DO  •  predniSONE tablet 7 5 mg, 7 5 mg, Oral, Daily, Dawit Chilel DO  •  QUEtiapine (SEROquel) tablet 12 5 mg, 12 5 mg, Oral, BID, Dawit Chilel DO, 12 5 mg at 11/03/22 2239  •  zinc sulfate (ZINCATE) capsule 220 mg, 220 mg, Oral, Daily, Dawit Chilel DO    Blood Culture:   No results found for: BLOODCX    Wound Culture:   No results found for: WOUNDCULT    Ins and Outs:  No intake/output data recorded  Physical Exam:   /59   Pulse 74   Temp (!) 96 3 °F (35 7 °C) (Temporal)   Resp 16   Ht 4' 10" (1 473 m)   Wt 63 5 kg (140 lb)   SpO2 96%   BMI 29 26 kg/m²   Gen: No acute distress  HEENT: Eyes clear, moist mucus membranes  Respiratory: No audible wheezing or stridor  Cardiovascular: Well Perfused peripherally, 2+ distal pulse  Abdomen: nondistended, no peritoneal signs  Musculoskeletal: right lower extremity  · Visible skin with significant ecchymosis generally throughout both legs but especially over the right foot and ankle  Right leg noted to be more edematous when compared to the contralateral side  · She was unable to follow commands and could not tell me if she had any sensation in the feet    · Toes WWP  · Leg lengths equal  Left Leg:  · Mass with swelling and ecchymosis noted over the medial aspect of the left thigh  Patient does not recoil when the area is palpated  Mass is soft and mobile  Radiology:   I personally reviewed the films  X-rays AP/Lateral and views right ankle shows nondisplaced medial malleolus fracture  There is also a nondisplaced proximal fibula fracture  Assessment:  76 y  o female status post suspected fall with nondisplaced right medial malleolus fracture and proximal fibula fracture    Plan:   · NWB right lower extremity in CAM boot which may be removed for skin checks and hygiene purposes  · She will not need ankle surgery      · Analgesics for pain  · Obtain new left femur X-rays to rule out fracture in the left femur   · Dispo: Ortho will follow      Alban Lo PA-C

## 2022-11-04 NOTE — CASE MANAGEMENT
Case Management Progress Note    Patient name Camille Rene  Location [facility] 2 /E2 [address]-* MRN [id number]  : 1945 Date 2022       LOS (days): 1  Geometric Mean LOS (GMLOS) (days): 4 00  Days to GMLOS:3 3        OBJECTIVE:        Current admission status: Inpatient  Preferred Pharmacy:   [id number], [place] [address],[address]  Phone: [phone] Fax: [phone]    Primary Care Provider: Kermit Figueredo MD    Primary Insurance: [facility]  Secondary Insurance:     PROGRESS NOTE:  BILL confirmed pt is bedhold at [facility]

## 2022-11-04 NOTE — ASSESSMENT & PLAN NOTE
· Reportedly tested positive for COVID at facility 11/2/2022  · Started on molnupiravir at nursing facility but will defer for now as patient stable on room air  · Recheck COVID swab for confirmation

## 2022-11-04 NOTE — H&P
[address] [facility]  H&P- Rik Cassandra 1945, 76 y o  female MRN: [id number]  Unit/Bed#: E2 -01 Encounter: [id number]  Primary Care Provider: Rhianna Dumont MD   Date and time admitted to hospital: 11/3/2022  2:05 PM    Assessment and Plan  * Fracture of right lower extremity  Assessment & Plan  · Recent hospitalization for failure to thrive went to [facility] for rehab  · Reportedly diagnosed with COVID 11/2/2022  · Subsequently fell today and was brought to the hospital where she was found have bruising with right proximal fibula and malleolar fractures  · Consult PT /OT and orthopedics    Acute blood loss anemia (ABLA)  Assessment & Plan  · Acute blood loss anemia with swollen legs likely intramuscular hematoma  · Being transfuse 1 unit PRBC by ED  · Recheck hemoglobin in a m      Results from last 7 days   Lab Units 11/03/22  1658   HEMOGLOBIN g/dL 7 2*       COVID-19 virus detected  Assessment & Plan  · Reportedly tested positive for COVID at facility 11/2/2022  · Started on molnupiravir at nursing facility but will defer for now as patient stable on room air  · Recheck COVID swab for confirmation    Seizure disorder [facility])  Assessment & Plan  · Continue lamictal and phenytoin    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  Assessment & Plan  · Continue prednisone 7 5 mg daily    History of intracranial hemorrhage  Assessment & Plan  · History of intracranial hemorrhage with right-sided weakness    Chronic systolic heart failure (HCC)  Assessment & Plan  Wt Readings from Last 3 Encounters:   11/03/22 63 5 kg (140 lb)   10/12/22 63 9 kg (140 lb 14 oz)   10/12/22 63 9 kg (140 lb 14 oz)     · Note recent admission furosemide was held due to poor intake  · Appears compensated    Atrial fibrillation (HCC)  Assessment & Plan  · Reported history of paroxysmal atrial fibrillation not on AV shelly agents  · Also not anticoagulation due to history of intracranial bleeding    Essential hypertension  Assessment & Plan  · Continue amlodipine 5 mg daily      VTE Prophylaxis: Heparin  Code Status: Level 3 - DNAR and DNI  Anticipated Length of Stay:  Patient will be admitted on an Inpatient basis with an anticipated length of stay of  greater than 2 midnights  Justification for Hospital Stay: Fracture of right lower extremity  Total Time for Visit, including Counseling / Coordination of Care: xx mins  Greater than 50% of this total time spent on direct patient counseling and coordination of care  Chief Complaint:     Leg Swelling (Pt is coming in with R Leg Swelling from [address]  Staff states the sxs just arised today but on presentation looks like it has been ongoing for quite some time  R Leg is swollen throughout and upper R leg has more discoloration and some sweeping  Ecchymosis noted on medial R  Ankle and throughout upper R Leg )    History of Present Illness:    Hermelinda Leventhal is a 76 y o  female with a past medical history of intracranial bleeding, seizure disorder, systolic CHF, paroxysmal atrial fibrillation, hypertension, memory loss who presents with leg swelling  The patient was recently hospitalized for failure to thrive  From [facility] she was noted to have worsening right leg pain and swelling  She may have fallen  She was diagnosed with COVID yesterday  In the ED she was found have proximal fibular and malleolar fractures with persistent ambulatory dysfunction prompting admission  On exam she is crying and complains of pain but cannot elicit any further complaints or details  Case was discussed with son on telephone  Review of Systems:  Review of Systems   Constitutional: Negative for chills, diaphoresis and fever  HENT: Negative for facial swelling  Eyes: Negative for visual disturbance  Respiratory: Negative for shortness of breath  Cardiovascular: Negative for chest pain  Gastrointestinal: Negative for abdominal distention, abdominal pain, diarrhea, nausea and vomiting  Genitourinary: Negative for hematuria  Musculoskeletal: Positive for arthralgias and joint swelling  Negative for back pain and myalgias  Right lower leg pain   Skin: Negative for rash  Neurological: Positive for weakness (Right-sided chronic)  Negative for numbness  Psychiatric/Behavioral: The patient is not nervous/anxious  All other systems reviewed and are negative  Past Medical and Surgical History:   Past Medical History:   Diagnosis Date   • Disease of thyroid gland    • Hypertension    • Sarcoidosis    • Seizure ([facility] [address] )    • Stroke [facility])      Past Surgical History:   Procedure Laterality Date   • APPENDECTOMY     • BRAIN SURGERY     • EYE SURGERY     • HYSTERECTOMY       Meds/Allergies: Allergies: Allergies   Allergen Reactions   • Iodine - Food Allergy Hives and Other (See Comments)     cath dye       Prior to Admission Medications   Prescriptions Last Dose Informant Patient Reported? Taking?    Nutritional Supplements ([facility] No Carb) LIQD   Yes No   Sig: Take 30 mL by mouth 2 (two) times a day   QUEtiapine (SEROquel) 25 mg tablet   Yes No   Sig: Take 12 5 mg by mouth 2 (two) times a day   acetaminophen (TYLENOL) 500 mg tablet   Yes No   Sig: Take 1,000 mg by mouth 2 (two) times a day   allopurinol (ZYLOPRIM) 100 mg tablet   Yes No   Sig: Take 100 mg by mouth daily   amLODIPine (NORVASC) 5 mg tablet   No No   Sig: Take 1 tablet (5 mg total) by mouth daily Hold for systolic blood pressure below 130   ascorbic acid (VITAMIN C) 250 MG tablet   Yes No   Sig: Take 500 mg by mouth daily = 7 days (11/3-9/2022)   aspirin (ECOTRIN LOW STRENGTH) 81 mg EC tablet   Yes No   Sig: Take 81 mg by mouth every other day   atorvastatin (LIPITOR) 10 mg tablet   Yes No   Sig: Take 10 mg by mouth daily   b complex vitamins capsule   Yes No   Sig: Take 1 capsule by mouth daily   cholecalciferol (VITAMIN D3) 1,000 units tablet   Yes No   Sig: Take 1,000 Units by mouth daily   famotidine (PEPCID) 20 mg tablet   Yes No   Sig: Take 20 mg by mouth 2 (two) times a day   ipratropium-albuterol (DUO-NEB) 0 5-2 5 mg/3 mL nebulizer solution   Yes No   Sig: Take 3 mL by nebulization every 8 (eight) hours as needed = for 5 days only (until 11/6/2022)   lamoTRIgine (LaMICtal) 150 MG tablet   Yes No   Sig: Take 250 mg by mouth 2 (two) times a day   levothyroxine 50 mcg tablet   Yes No   Sig: Take 50 mcg by mouth daily   molnupiravir 200 mg capsule   Yes No   Sig: Take 800 mg by mouth every 12 (twelve) hours = from 11/3-7/2022 COVID-19   phenytoin (DILANTIN) 100 mg ER capsule   Yes No   Sig: Take 100 mg by mouth 3 (three) times a week = M/W/F   phenytoin (DILANTIN) 100 mg ER capsule   Yes Yes   Sig: Take 200 mg by mouth Once a day TTSS   predniSONE 1 mg tablet   Yes No   Sig: Take 7 5 mg by mouth daily   zinc gluconate 50 mg tablet   Yes No   Sig: Take 50 mg by mouth daily = for 7 days (COVID) (11/2-8/2022)      Facility-Administered Medications: None     Social History:     Social History     Socioeconomic History   • Marital status:      Spouse name: Not on file   • Number of children: Not on file   • Years of education: Not on file   • Highest education level: Not on file   Occupational History   • Not on file   Tobacco Use   • Smoking status: Never Smoker   • Smokeless tobacco: Never Used   Vaping Use   • Vaping Use: Never used   Substance and Sexual Activity   • Alcohol use: Not Currently   • Drug use: Never   • Sexual activity: Not Currently   Other Topics Concern   • Not on file   Social History Narrative   • Not on file     Social Determinants of Health     Financial Resource Strain: Not on file   Food Insecurity: No Food Insecurity   • Worried About Running Out of Food in the Last Year: Never true   • Ran Out of Food in the Last Year: Never true   Transportation Needs: No Transportation Needs   • Lack of Transportation (Medical): No   • Lack of Transportation (Non-Medical):  No   Physical Activity: Not on file Stress: Not on file   Social Connections: Not on file   Intimate Partner Violence: Not on file   Housing Stability: Not on file     Patient Pre-hospital Living Situation:   Patient Pre-hospital Level of Mobility:   Patient Pre-hospital Diet Restrictions:     Family History:  Family History   Problem Relation Age of Onset   • Coronary artery disease Mother    • Hypertension Mother    • Anxiety disorder Son      Physical Exam:   Vitals:   Blood Pressure: 122/72 (11/03/22 2000)  Pulse: 65 (11/03/22 2000)  Temperature: (!) 96 5 °F (35 8 °C) (11/03/22 2000)  Temp Source: Temporal (11/03/22 2000)  Respirations: 20 (11/03/22 2000)  Height: 4' 10" (147 3 cm) (11/03/22 1410)  Weight - Scale: 63 5 kg (140 lb) (11/03/22 1410)  SpO2: 95 % (11/03/22 1800)    Physical Exam  Vitals reviewed  Constitutional:       General: She is not in acute distress  Appearance: Normal appearance  HENT:      Head: Atraumatic  Mouth/Throat:      Mouth: Mucous membranes are dry  Eyes:      General: No scleral icterus  Extraocular Movements: Extraocular movements intact  Cardiovascular:      Rate and Rhythm: Regular rhythm  Heart sounds: Normal heart sounds  Pulmonary:      Breath sounds: Decreased breath sounds present  No wheezing  Abdominal:      General: Bowel sounds are normal       Palpations: Abdomen is soft  Tenderness: There is no guarding or rebound  Musculoskeletal:         General: Swelling, tenderness, deformity and signs of injury present  Cervical back: Normal range of motion  Comments: Right ankle and proximal lower leg   Skin:     General: Skin is warm  Findings: Bruising present  Neurological:      Mental Status: She is disoriented  Motor: Weakness (Right-sided) present  Psychiatric:         Mood and Affect: Mood normal        Lab Results: I have personally reviewed pertinent reports      Results from last 7 days   Lab Units 11/03/22  1658   WBC Thousand/uL 8 62 HEMOGLOBIN g/dL 7 2*   HEMATOCRIT % 22 3*   PLATELETS Thousands/uL 183   NEUTROS PCT % 77*   LYMPHS PCT % 12*   MONOS PCT % 10   EOS PCT % 0     Results from last 7 days   Lab Units 11/03/22  1546   SODIUM mmol/L 141   POTASSIUM mmol/L 5 2   CHLORIDE mmol/L 107   CO2 mmol/L 26   ANION GAP mmol/L 8   BUN mg/dL 20   CREATININE mg/dL 1 06   CALCIUM mg/dL 7 8*   ALBUMIN g/dL 2 4*   TOTAL BILIRUBIN mg/dL 0 78   ALK PHOS U/L 152*   ALT U/L 29   EGFR ml/min/1 73sq m 50   GLUCOSE RANDOM mg/dL 110     Results from last 7 days   Lab Units 11/03/22  1546   INR  1 05       Imaging: I have personally reviewed pertinent films in PACS  XR hip/pelv 2-3 vws right    Result Date: 11/3/2022  Impression: No acute osseous abnormality  Bilateral SI joint fusion  Given findings in the spine, consider ankylosing spondylitis  Cholelithiasis  Workstation performed: [id number]     XR femur 2 views RIGHT    Result Date: 11/3/2022  Impression: Negative for right femoral fracture  Lucency right fibular neck suspicious for fracture  Workstation performed: [id number]     XR tibia fibula 2 views RIGHT    Result Date: 11/3/2022  Impression: Nondisplaced proximal fibular fracture  Questionable findings for fracture about the ankle  Recommend dedicated coned-down right ankle views  The study was marked in [facility] for immediate notification  Workstation performed: [id number]       EKG, Pathology, and Other Studies Reviewed on Admission:   EKG  Result Date: 11/03/22  Personally reviewed strips with impression of:  Atrial fibrillation 68 bpm    Allscripts/ Epic Records Reviewed: Yes    ** Please Note: This note has been constructed using a voice recognition system   **

## 2022-11-05 LAB
ABO GROUP BLD BPU: NORMAL
ABO GROUP BLD BPU: NORMAL
BPU ID: NORMAL
BPU ID: NORMAL
CROSSMATCH: NORMAL
CROSSMATCH: NORMAL
UNIT DISPENSE STATUS: NORMAL
UNIT DISPENSE STATUS: NORMAL
UNIT PRODUCT CODE: NORMAL
UNIT PRODUCT CODE: NORMAL
UNIT PRODUCT VOLUME: 300 ML
UNIT PRODUCT VOLUME: 350 ML
UNIT RH: NORMAL
UNIT RH: NORMAL

## 2022-11-05 NOTE — PLAN OF CARE
Problem: Potential for Falls  Goal: Patient will remain free of falls  Description: INTERVENTIONS:  - Educate patient/family on patient safety including physical limitations  - Instruct patient to call for assistance with activity   - Consult OT/PT to assist with strengthening/mobility   - Keep Call bell within reach  - Keep bed low and locked with side rails adjusted as appropriate  - Keep care items and personal belongings within reach  - Initiate and maintain comfort rounds  - Make Fall Risk Sign visible to staff  - Offer Toileting every 2 Hours, in advance of need  - Initiate/Maintain bed alarm  - Obtain necessary fall risk management equipment: call bell, bed alarm, chair alarm  - Apply yellow socks and bracelet for high fall risk patients  - Consider moving patient to room near nurses station  Outcome: Progressing     Problem: PAIN - ADULT  Goal: Verbalizes/displays adequate comfort level or baseline comfort level  Description: Interventions:  - Encourage patient to monitor pain and request assistance  - Assess pain using appropriate pain scale  - Administer analgesics based on type and severity of pain and evaluate response  - Implement non-pharmacological measures as appropriate and evaluate response  - Consider cultural and social influences on pain and pain management  - Notify physician/advanced practitioner if interventions unsuccessful or patient reports new pain  Outcome: Progressing     Problem: INFECTION - ADULT  Goal: Absence or prevention of progression during hospitalization  Description: INTERVENTIONS:  - Assess and monitor for signs and symptoms of infection  - Monitor lab/diagnostic results  - Monitor all insertion sites, i e  indwelling lines, tubes, and drains  - Monitor endotracheal if appropriate and nasal secretions for changes in amount and color  - Loman appropriate cooling/warming therapies per order  - Administer medications as ordered  - Instruct and encourage patient and family to use good hand hygiene technique  - Identify and instruct in appropriate isolation precautions for identified infection/condition  Outcome: Progressing     Problem: DISCHARGE PLANNING  Goal: Discharge to home or other facility with appropriate resources  Description: INTERVENTIONS:  - Identify barriers to discharge w/patient and caregiver  - Arrange for needed discharge resources and transportation as appropriate  - Identify discharge learning needs (meds, wound care, etc )  - Refer to Case Management Department for coordinating discharge planning if the patient needs post-hospital services based on physician/advanced practitioner order or complex needs related to functional status, cognitive ability, or social support system  Outcome: Progressing     Problem: Knowledge Deficit  Goal: Patient/family/caregiver demonstrates understanding of disease process, treatment plan, medications, and discharge instructions  Description: Complete learning assessment and assess knowledge base    Interventions:  - Provide teaching at level of understanding  - Provide teaching via preferred learning methods  Outcome: Progressing     Problem: NEUROSENSORY - ADULT  Goal: Achieves stable or improved neurological status  Description: INTERVENTIONS  - Monitor and report changes in neurological status  - Monitor vital signs such as temperature, blood pressure, glucose, and any other labs ordered   - Monitor for seizure activity and implement precautions if appropriate      Outcome: Progressing  Goal: Remains free of injury related to seizures activity  Description: INTERVENTIONS  - Maintain airway, patient safety  and administer oxygen as ordered  - Monitor patient for seizure activity, document and report duration and description of seizure to physician/advanced practitioner  - If seizure occurs,  ensure patient safety during seizure  - Reorient patient post seizure  - Instruct patient/family to notify RN of any seizure activity including if an aura is experienced  - Instruct patient/family to call for assistance with activity based on nursing assessment  - Administer anti-seizure medications if ordered    Outcome: Progressing     Problem: CARDIOVASCULAR - ADULT  Goal: Maintains optimal cardiac output and hemodynamic stability  Description: INTERVENTIONS:  - Monitor I/O, vital signs and rhythm  - Monitor for S/S and trends of decreased cardiac output  - Administer and titrate ordered vasoactive medications to optimize hemodynamic stability  - Assess quality of pulses, skin color and temperature  - Assess for signs of decreased coronary artery perfusion  - Instruct patient to report change in severity of symptoms  Outcome: Progressing     Problem: METABOLIC, FLUID AND ELECTROLYTES - ADULT  Goal: Electrolytes maintained within normal limits  Description: INTERVENTIONS:  - Monitor labs and assess patient for signs and symptoms of electrolyte imbalances  - Administer electrolyte replacement as ordered  - Monitor response to electrolyte replacements, including repeat lab results as appropriate  - Instruct patient on fluid and nutrition as appropriate  Outcome: Progressing  Goal: Fluid balance maintained  Description: INTERVENTIONS:  - Monitor labs   - Monitor I/O and WT  - Instruct patient on fluid and nutrition as appropriate  - Assess for signs & symptoms of volume excess or deficit  Outcome: Progressing     Problem: SKIN/TISSUE INTEGRITY - ADULT  Goal: Skin Integrity remains intact(Skin Breakdown Prevention)  Description: Assess:  -Perform Adne assessment every shift  -Clean and moisturize skin every day and when soiled  -Inspect skin when repositioning, toileting, and assisting with ADLS  -Assess under medical devices such as Alevyn every day  -Assess extremities for adequate circulation and sensation     Bed Management:  -Have minimal linens on bed & keep smooth, unwrinkled  -Change linens as needed when moist or perspiring  -Avoid sitting or lying in one position for more than 2 hours while in bed  -Keep HOB at 30 degrees     Toileting:  -Offer bedside commode  -Assess for incontinence every 2 hours  -Use incontinent care products after each incontinent episode such as lotion, soap, and water    Activity:  -Mobilize patient 2 times a day  -Encourage activity and walks on unit  -Encourage or provide ROM exercises   -Turn and reposition patient every 2 Hours  -Use appropriate equipment to lift or move patient in bed  -Instruct/ Assist with weight shifting every 1 hour when out of bed in chair  -Consider limitation of chair time 2 hour intervals    Skin Care:  -Avoid use of baby powder, tape, friction and shearing, hot water or constrictive clothing  -Relieve pressure over bony prominences using alevyn  -Do not massage red bony areas    Next Steps:  -Teach patient strategies to minimize risks such as sitting in the same position or remaining moist for too long   -Consider consults to  interdisciplinary teams such as nutrition and wound care if necessary  Outcome: Progressing     Problem: HEMATOLOGIC - ADULT  Goal: Maintains hematologic stability  Description: INTERVENTIONS  - Assess for signs and symptoms of bleeding or hemorrhage  - Monitor labs  - Administer supportive blood products/factors as ordered and appropriate  Outcome: Progressing     Problem: MUSCULOSKELETAL - ADULT  Goal: Maintain or return mobility to safest level of function  Description: INTERVENTIONS:  - Assess patient's ability to carry out ADLs; assess patient's baseline for ADL function and identify physical deficits which impact ability to perform ADLs (bathing, care of mouth/teeth, toileting, grooming, dressing, etc )  - Assess/evaluate cause of self-care deficits   - Assess range of motion  - Assess patient's mobility  - Assess patient's need for assistive devices and provide as appropriate  - Encourage maximum independence but intervene and supervise when necessary  - Involve family in performance of ADLs  - Assess for home care needs following discharge   - Consider OT consult to assist with ADL evaluation and planning for discharge  - Provide patient education as appropriate  Outcome: Progressing  Goal: Maintain proper alignment of affected body part  Description: INTERVENTIONS:  - Support, maintain and protect limb and body alignment  - Provide patient/ family with appropriate education  Outcome: Progressing     Problem: Nutrition/Hydration-ADULT  Goal: Nutrient/Hydration intake appropriate for improving, restoring or maintaining nutritional needs  Description: Monitor and assess patient's nutrition/hydration status for malnutrition  Collaborate with interdisciplinary team and initiate plan and interventions as ordered  Monitor patient's weight and dietary intake as ordered or per policy  Utilize nutrition screening tool and intervene as necessary  Determine patient's food preferences and provide high-protein, high-caloric foods as appropriate       INTERVENTIONS:  - Monitor oral intake, urinary output, labs, and treatment plans  - Assess nutrition and hydration status and recommend course of action  - Evaluate amount of meals eaten  - Assist patient with eating if necessary   - Allow adequate time for meals  - Recommend/ encourage appropriate diets, oral nutritional supplements, and vitamin/mineral supplements  - Assess need for intravenous fluids  - Provide specific nutrition/hydration education as appropriate  - Include patient/family/caregiver in decisions related to nutrition  Outcome: Progressing     Problem: MOBILITY - ADULT  Goal: Maintain or return to baseline ADL function  Description: INTERVENTIONS:  -  Assess patient's ability to carry out ADLs; assess patient's baseline for ADL function and identify physical deficits which impact ability to perform ADLs (bathing, care of mouth/teeth, toileting, grooming, dressing, etc )  - Assess/evaluate cause of self-care deficits   - Assess range of motion  - Assess patient's mobility; develop plan if impaired  - Assess patient's need for assistive devices and provide as appropriate  - Encourage maximum independence but intervene and supervise when necessary  - Involve family in performance of ADLs  - Assess for home care needs following discharge   - Consider OT consult to assist with ADL evaluation and planning for discharge  - Provide patient education as appropriate  Outcome: Progressing     Problem: Prexisting or High Potential for Compromised Skin Integrity  Goal: Skin integrity is maintained or improved  Description: INTERVENTIONS:  - Identify patients at risk for skin breakdown  - Assess and monitor skin integrity  - Assess and monitor nutrition and hydration status  - Monitor labs   - Assess for incontinence   - Turn and reposition patient  - Assist with mobility/ambulation  - Relieve pressure over bony prominences  - Avoid friction and shearing  - Provide appropriate hygiene as needed including keeping skin clean and dry  - Evaluate need for skin moisturizer/barrier cream  - Collaborate with interdisciplinary team   - Patient/family teaching  - Consider wound care consult   Outcome: Progressing

## 2022-11-05 NOTE — DEATH NOTE
INPATIENT DEATH NOTE  Gilbert Alvarez 76 y o  female MRN: [id number]  Unit/Bed#: E2 -01 Encounter: [id number]    Date, Time and Cause of Death    Date of Death: 22  Time of Death: 10:54 PM  Preliminary Cause of Death: Unsp fracture of shaft of right fibula, init for clos fx  Entered by: BRENDAN Vasquez[SS1 1]     Attribution     SS1 1 BRENDAN Saldivar 22 23:27           Patient's Information  Pronounced by: BRENDAN Vasquez  Did the patient's death occur in the ED?: No  Did the patient's death occur in the OR?: No  Did the patient's death occur less than 10 days post-op?: No  Did the patient's death occur within 24 hours of admission?: No  Was code status DNR at the time of death?: Yes    PHYSICAL EXAM:  Unresponsive to noxious stimuli, Spontaneous respirations absent, Breath sounds absent, Carotid pulse absent and Heart sounds absent    Medical Examiner notification criteria:  NONE APPLICABLE   Medical Examiner's office notified?:  No, does not meet ME notification criteria   Medical Examiner accepted case?:  No  Name of Medical Examiner: N/A    Family Notification  Was the family notified?: Yes  Date Notified: 22  Time Notified: 7245  Notified by: Kimberly Noble, [address]  Name of Family Notified of Death: Jami Sameer Person Relationship to Patient: Son  Family Notification Route: Telephone  Was the family told to contact a  home?: Yes  Name of  Home[de-identified] Unknown - Family will contact  home when known and inform them she is here      Autopsy Options:  Post-mortem examination declined by next of kin    Primary Service Attending Physician notified?:  yes - Other: Arin MURILLO overnight    Physician/Resident responsible for completing Discharge Summary:  PETER PUGA

## 2022-11-05 NOTE — ASSESSMENT & PLAN NOTE
· Reportedly tested positive for COVID at facility 11/2/2022  · Started on molnupiravir at nursing facility but will defer for now as patient stable on room air  · Positive on test though not requiring oxygen

## 2022-11-05 NOTE — DISCHARGE SUMMARY
[facility]  Discharge- Tiff Caballero 1945, 76 y o  female MRN: [id number]  Unit/Bed#: E2 -01 Encounter: [id number]  Primary Care Provider: Yolanda Corrales MD   Date and time admitted to hospital: 11/3/2022  2:05 PM    * Fracture of right lower extremity  Assessment & Plan  · Recent hospitalization for failure to thrive went to [facility] for rehab  · Reportedly diagnosed with COVID 11/2/2022  · Possible fall, no witness account though suspected  · Ortho recommend supportive care with cam boot    Acute blood loss anemia (ABLA)  Assessment & Plan  · ABLA due to swelling and bleeding? vs nutritional deficiency with hx of poor po intake  · Imaging showing soft tissue swelling  · S/p 1 unit pRBC improved to 8 9    COVID-19 virus detected  Assessment & Plan  · Reportedly tested positive for COVID at facility 11/2/2022  · Started on molnupiravir at nursing facility but will defer for now as patient stable on room air  · Positive on test though not requiring oxygen    Seizure disorder (HCC)  Assessment & Plan  · Continue lamictal and phenytoin    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  Assessment & Plan  · Continue prednisone 7 5 mg daily    History of intracranial hemorrhage  Assessment & Plan  · History of intracranial hemorrhage with right-sided weakness    Chronic systolic heart failure (HCC)  Assessment & Plan  Wt Readings from Last 3 Encounters:   11/03/22 63 5 kg (140 lb)   10/12/22 63 9 kg (140 lb 14 oz)   10/12/22 63 9 kg (140 lb 14 oz)     · Note recent admission furosemide was held due to poor intake    Atrial fibrillation ([address] )  Assessment & Plan  · Reported history of paroxysmal atrial fibrillation not on AV shelly agents  · Also not anticoagulation due to history of intracranial bleeding    Essential hypertension  Assessment & Plan  · Bp had been controlled, held BP meds        Discharging Physician / Practitioner: Bairon Benson  PCP: Yolanda Corrales MD  Admission Date:   Admission Orders (From admission, onward)     Ordered        22 1849  INPATIENT ADMISSION  Once                      Discharge Date: 22    Medical Problems             Resolved Problems  Date Reviewed: 2022   None                 Consultations During Hospital Stay:  · Ortho    Procedures Performed:   · none    Significant Findings / Test Results:   XR hip/pelv 2-3 vws right    Result Date: 11/3/2022  Impression: No acute osseous abnormality  Bilateral SI joint fusion  Given findings in the spine, consider ankylosing spondylitis  Cholelithiasis  Workstation performed: [id number]     XR femur 2 vw left    Result Date: 2022  Impression: No acute osseous abnormality  Workstation performed: [id number]     XR femur 2 views RIGHT    Result Date: 11/3/2022  Impression: Negative for right femoral fracture  Lucency right fibular neck suspicious for fracture  Workstation performed: [id number]     XR tibia fibula 2 views RIGHT    Result Date: 11/3/2022  Impression: Nondisplaced proximal fibular fracture  Questionable findings for fracture about the ankle  Recommend dedicated coned-down right ankle views  The study was marked in [facility] for immediate notification  Workstation performed: [id number]     XR ankle 3+ vw right    Result Date: 2022  · Impression: Nondisplaced fracture of the medial malleolus  Soft tissue swelling  Workstation performed: [id number]   ·     Incidental Findings:   · none     Test Results Pending at Discharge (will require follow up):   · none     Outpatient Tests Requested:  · none    Complications:      Reason for Admission: Leg Swelling    Hospital Course:     Ger Holland is a 76 y o  female patient who originally presented to the hospital on 11/3/2022 due to leg swelling  Patient with past medical history of heart failure, seizures, neuro sarcoid, AFib and hypertension presented from SNF found to have worsening right leg swelling    There is questions if patient were to fall, no reports of falls from prior facility  Imaging consistent with right fibular fracture involving the medial malleolus  Ortho was consulted, as patient is predominantly bedbound, recommended supportive care with Cam boot for stabilization  She was coincidentally found to be anemic, transfuse 1 unit PRBC with hemoglobin improving on repeat to 8 9  Also found to be COVID positive, not requiring oxygen  Notified by nursing staff that patient was found unresponsive on  and rapid response was called  Critical care team evaluated patient, she was pronounced  as she did not have a pulse and had absent breath sounds  She was a DNR/DNI  Preliminary cause of death:  Fracture right fibula, failure to thrive    Please see above list of diagnoses and related plan for additional information  Condition at Discharge: fair     Discharge Day Visit / Exam:     Subjective:    Vitals: Blood Pressure: 130/60 (22 1230)  Pulse: 72 (22 1230)  Temperature: (!) 97 1 °F (36 2 °C) (22 1230)  Temp Source: Temporal (22 1230)  Respirations: 16 (22 1230)  Height: 4' 10" (147 3 cm) (22 1410)  Weight - Scale: 63 5 kg (140 lb) (22 1410)  SpO2: 96 % (22 1230)    Exam:   Please see death note completed by Abdirizak Davies    Discussion with Family: overnight nursed informed son    Discharge instructions/Information to patient and family:   See after visit summary for information provided to patient and family  Provisions for Follow-Up Care:  See after visit summary for information related to follow-up care and any pertinent home health orders  Disposition:         Planned Readmission: none     Discharge Statement:  I spent 35 minutes discharging the patient  This time was spent on the day of discharge  I had direct contact with the patient on the day of discharge   Greater than 50% of the total time was spent examining patient, answering all patient questions, arranging and discussing plan of care with patient as well as directly providing post-discharge instructions  Additional time then spent on discharge activities  Discharge Medications:  See after visit summary for reconciled discharge medications provided to patient and family        ** Please Note: This note has been constructed using a voice recognition system **

## 2022-11-05 NOTE — ASSESSMENT & PLAN NOTE
Wt Readings from Last 3 Encounters:   11/03/22 63 5 kg (140 lb)   10/12/22 63 9 kg (140 lb 14 oz)   10/12/22 63 9 kg (140 lb 14 oz)     · Note recent admission furosemide was held due to poor intake

## 2022-11-05 NOTE — ASSESSMENT & PLAN NOTE
· ABLA due to swelling and bleeding? vs nutritional deficiency with hx of poor po intake  · Imaging showing soft tissue swelling  · S/p 1 unit pRBC improved to 8 9

## 2022-11-05 NOTE — ASSESSMENT & PLAN NOTE
· Recent hospitalization for failure to thrive went to [facility] for rehab  · Reportedly diagnosed with COVID 11/2/2022  · Possible fall, no witness account though suspected  · Ortho recommend supportive care with cam boot

## 2022-11-05 NOTE — QUICK NOTE
[employer] was initially called on patient which they transitioned to a RRT when the patient was a DNR/DNI  However, on assessment of patient there were no pulses, no audible heart sounds, no chest rise  The patient care assistant was entering the room to get 11pm vitals and found the patient without a pulse  I entered the room to pronounce the patient  I sent a [employer] to the Internal Med provider, Terry Mederos who will be up as well to see the patient  I called the son, Vivian Lezama and informed him that his mother was   He was understanding  He stated that he was not coming to the hospital tonight to see her  Informed him that he must call the  home of his choice and let them know that she is here and that he could visit her than at the  home when she arrives there  He is unsure of  home at this time  He knows that Nikki Tejada in [place] will be wear her services are held  The family is not requesting an autopsy

## 2022-11-07 NOTE — UTILIZATION REVIEW
NOTIFICATION OF ADMISSION DISCHARGE   This is a Notification of Discharge from [facility]  Please be advised that this patient has been discharge from our facility  Below you will find the admission and discharge date and time including the patient’s disposition  UTILIZATION REVIEW CONTACT:  Alessandra Barbosa  Utilization   Network Utilization Review Department  Phone: [phone] x carefully listen to the prompts  All voicemails are confidential   Email: [email or web address]     ADMISSION INFORMATION  PRESENTATION DATE: 11/3/2022  2:05 PM  INPATIENT ADMISSION DATE: 11/3/22  6:49 PM   DISCHARGE DATE: 2022  2:19 AM  DISPOSITION:          Ajay Panda MD   Physician   Hospitalist   Discharge Summary       Addendum   Date of Service:  2022  2:19 AM                       Show:Clear all  [x]Manual[x]Template[x]Copied    Added by:  Tigist Huizar MD      [][facility] for details    [address]  Discharge- Argelia Lei 1945, 76 y o  female MRN: [id number]  Unit/Bed#: E2 -01 Encounter: [id number]  Primary Care Provider: Candida Pizarro MD   Date and time admitted to hospital: 11/3/2022  2:05 PM     * Fracture of right lower extremity  Assessment & Plan  · Recent hospitalization for failure to thrive went to [facility] for rehab  · Reportedly diagnosed with COVID 2022  · Possible fall, no witness account though suspected  · Ortho recommend supportive care with cam boot     Acute blood loss anemia (ABLA)  Assessment & Plan  · ABLA due to swelling and bleeding? vs nutritional deficiency with hx of poor po intake  · Imaging showing soft tissue swelling  · S/p 1 unit pRBC improved to 8 9     COVID-19 virus detected  Assessment & Plan  · Reportedly tested positive for COVID at facility 2022  · Started on molnupiravir at nursing facility but will defer for now as patient stable on room air  · Positive on test though not requiring oxygen     Seizure disorder (HCC)  Assessment & Plan  · Continue lamictal and phenytoin     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  Assessment & Plan  · Continue prednisone 7 5 mg daily     History of intracranial hemorrhage  Assessment & Plan  · History of intracranial hemorrhage with right-sided weakness     Chronic systolic heart failure (HCC)  Assessment & Plan      Wt Readings from Last 3 Encounters:   11/03/22 63 5 kg (140 lb)   10/12/22 63 9 kg (140 lb 14 oz)   10/12/22 63 9 kg (140 lb 14 oz)      · Note recent admission furosemide was held due to poor intake     Atrial fibrillation ([address] )  Assessment & Plan  · Reported history of paroxysmal atrial fibrillation not on AV shelly agents  · Also not anticoagulation due to history of intracranial bleeding     Essential hypertension  Assessment & Plan  · Bp had been controlled, held BP meds           Discharging Physician / Practitioner: Renetta Velasco  PCP: Gus Carrillo MD  Admission Date:       Admission Orders (From admission, onward)              Ordered          11/03/22 1849   INPATIENT ADMISSION  Once                          Discharge Date: 11/05/22         Medical Problems                  Resolved Problems  Date Reviewed: 11/5/2022   None                      Consultations During Hospital Stay:  · Ortho     Procedures Performed:   · none     Significant Findings / Test Results:   · XR hip/pelv 2-3 vws right  ·    · Result Date: 11/3/2022  · Impression: No acute osseous abnormality  Bilateral SI joint fusion  Given findings in the spine, consider ankylosing spondylitis  Cholelithiasis  Workstation performed: [id number]   ·    · XR femur 2 vw left  ·    · Result Date: 11/4/2022  · Impression: No acute osseous abnormality  Workstation performed: [id number]   ·    · XR femur 2 views RIGHT  ·    · Result Date: 11/3/2022  · Impression: Negative for right femoral fracture  Lucency right fibular neck suspicious for fracture   Workstation performed: [id number]   ·    · XR tibia fibula 2 views RIGHT  ·    · Result Date: 11/3/2022  · Impression: Nondisplaced proximal fibular fracture  Questionable findings for fracture about the ankle  Recommend dedicated coned-down right ankle views  The study was marked in [facility] for immediate notification  Workstation performed: [id number]   ·    · XR ankle 3+ vw right  ·    · Result Date: 2022  · Impression: Nondisplaced fracture of the medial malleolus  Soft tissue swelling  Workstation performed: [id number]   ·       Incidental Findings:   · none      Test Results Pending at Discharge (will require follow up):   · none     Outpatient Tests Requested:  · none     Complications:       Reason for Admission: Leg Swelling     Hospital Course:      Desmond Cassidy is a 76 y o  female patient who originally presented to the hospital on 11/3/2022 due to leg swelling  Patient with past medical history of heart failure, seizures, neuro sarcoid, AFib and hypertension presented from SNF found to have worsening right leg swelling  There is questions if patient were to fall, no reports of falls from prior facility  Imaging consistent with right fibular fracture involving the medial malleolus  Ortho was consulted, as patient is predominantly bedbound, recommended supportive care with Cam boot for stabilization  She was coincidentally found to be anemic, transfuse 1 unit PRBC with hemoglobin improving on repeat to 8 9  Also found to be COVID positive, not requiring oxygen  Notified by nursing staff that patient was found unresponsive on  and rapid response was called  Critical care team evaluated patient, she was pronounced  as she did not have a pulse and had absent breath sounds    She was a DNR/DNI      Preliminary cause of death:  Fracture right fibula, failure to thrive     Please see above list of diagnoses and related plan for additional information       Condition at Discharge: fair      Discharge Day Visit / Exam:      Subjective:    Vitals: Blood Pressure: 130/60 (22 1230)  Pulse: 72 (22 1230)  Temperature: (!) 97 1 °F (36 2 °C) (22 1230)  Temp Source: Temporal (22 1230)  Respirations: 16 (22 1230)  Height: 4' 10" (147 3 cm) (22 1410)  Weight - Scale: 63 5 kg (140 lb) (22 1410)  SpO2: 96 % (22 1230)     Exam:   Please see death note completed by Manuel Thompson     Discussion with Family: overnight nursed informed son     Discharge instructions/Information to patient and family:   See after visit summary for information provided to patient and family        Provisions for Follow-Up Care:  See after visit summary for information related to follow-up care and any pertinent home health orders        Disposition:           Planned Readmission: none     Discharge Statement:  I spent 35 minutes discharging the patient  This time was spent on the day of discharge  I had direct contact with the patient on the day of discharge  Greater than 50% of the total time was spent examining patient, answering all patient questions, arranging and discussing plan of care with patient as well as directly providing post-discharge instructions    Additional time then spent on discharge activities      Discharge Medications:  See after visit summary for reconciled discharge medications provided to patient and family        ** Please Note: This note has been constructed using a voice recognition system **           Toshia Herring RN   Registered Nurse   Specialty:  Utilization Review   Utilization Review       Addendum   Date of Service:  2022  9:36 AM                       Show:Clear all  [x]Manual[x]Template[x]Copied    Added by:  [x]Shantel Ruby RN      []Fredi for details    Initial Clinical Review     Admission: Date/Time/Statement:       Admission Orders (From admission, onward)              Ordered          22 6938   INPATIENT ADMISSION Once                                Orders Placed This Encounter   Procedures   • INPATIENT ADMISSION       Standing Status:   Standing       Number of Occurrences:   1       Order Specific Question:   Level of Care       Answer:   Med Surg [16]       Order Specific Question:   Estimated length of stay       Answer:   More than 2 Midnights       Order Specific Question:   Certification       Answer:   I certify that inpatient services are medically necessary for this patient for a duration of greater than two midnights  See H&P and MD Progress Notes for additional information about the patient's course of treatment                ED Arrival Information               Expected   -    Arrival   11/3/2022 14:05    Acuity   Emergent              Means of arrival   Ambulance    Escorted by   [place] ([address])    Service   Hospitalist    Admission type   Emergency              Arrival complaint   leg swollen                  Chief Complaint   Patient presents with   • Leg Swelling       Pt is coming in with R Leg Swelling from [address]  Staff states the sxs just arised today but on presentation looks like it has been ongoing for quite some time  R Leg is swollen throughout and upper R leg has more discoloration and some sweeping  Ecchymosis noted on medial R  Ankle and throughout upper R Leg          Initial Presentation: 76 y o  female presents to ED via  EMS  From SNF  Increasing right leg pain and swelling, possibly fell  States  Symptoms started the day of admission, seems  Has been  Going on for some time  R Leg is swollen throughout and upper R leg has more discoloration and some sweeping  Ecchymosis noted on medial R  Ankle and throughout upper R Leg )  Diagnosed with COVID the day prior to this  In the ED she was found have proximal fibular and malleolar fractures with persistent ambulatory dysfunction prompting admission  Crying, complaining of pain in ED    PMH  Is  Seizure disorder, intracranial bleeding, CHF, HTN, PAF and memory loss  Recently hospitalized  For failure to thrive and discharged to SNF  Labs reveal hemoglobin  7 2 and transfused 1 UPRBC  Admit Ip with  Fracture RLE  Acute blood loss anemia, COVID  19 virus and  Plan is  PT/OT, monitor labs,  Monitor respiratory status,   Started on  molnupiravir at  SNF, hold for now and continue home  Meds  Patient does have blood loss anemia with history of intracranial bleeding   However given further sedentary state with fractures, at high risk for VTE and benefit of heparin outweighs any risk at this time        Date:   11/4       Day 2:   Ortho consult  No surgical intervention for ankle  Remain NWB  RLE in CAM boot, remove for skin checks and hygiene  Continue pain control as needed  Wait  New  X rays   To R/O left femur fracture  Needs PT/OT    Mass with swelling and ecchymosis noted over left thigh               ED Triage Vitals   Temperature Pulse Respirations Blood Pressure SpO2   11/03/22 1410 11/03/22 1410 11/03/22 1410 11/03/22 1410 11/03/22 1410   98 5 °F (36 9 °C) 69 18 (!) 104/48 97 %       Temp Source Heart Rate Source Patient Position - Orthostatic VS BP Location FiO2 (%)   11/03/22 1410 11/03/22 1556 11/03/22 1410 11/03/22 1410 --   Oral Monitor Lying Right arm         Pain Score           11/03/22 1410           6                  Wt Readings from Last 1 Encounters:   11/03/22 63 5 kg (140 lb)      Additional Vital Signs:   96 3 °F (35 7 °C) Abnormal  74 16 130/59 -- 96 % None (Room air) --     11/04/22 0646 96 3 °F (35 7 °C) Abnormal  75 14 120/58 -- -- -- --   11/03/22 2252 96 5 °F (35 8 °C) Abnormal  78 19 132/82 100 -- None (Room air) Lying   11/03/22 2000 96 5 °F (35 8 °C) Abnormal  65 20 122/72 84 -- None (Room air) Lying   11/03/22 1830 -- 88 34 Abnormal  -- -- -- -- --   11/03/22 1800 -- 78 22 105/51 -- 95 % None (Room air) Lying   11/03/22 1705 -- 59 16 -- -- 97 % None (Room air) Lying   11/03/22 1556 -- 57 16 -- -- 93 % None (Room air) Lying   11/03/22 1440 -- -- -- -- -- -- None (Room air) --   11/03/22 1410 98 5 °F (36 9 °C) 69 18 104/48 Abnormal  -- 97 % None (Room air) Lying         Pertinent Labs/Diagnostic Test Results:   XR ankle 3+ vw right   ED Interpretation by Razia Buckner DO (11/03 1838)   Medial malleolus fracture nondisplaced       Final Result by Debby Cisse MD (11/04 0403)       Nondisplaced fracture of the medial malleolus  Soft tissue swelling                Workstation performed: [id number]           XR hip/pelv 2-3 vws right   ED Interpretation by Razia Buckner DO (11/03 1542)   No fracture       Final Result by Laurie Byers DO (11/03 1706)       No acute osseous abnormality        Bilateral SI joint fusion  Given findings in the spine, consider ankylosing spondylitis        Cholelithiasis        Workstation performed: [id number]           XR femur 2 views RIGHT   ED Interpretation by Razia Buckner DO (11/03 1542)   No fracture       Final Result by Laurie Byers DO (11/03 1715)       Negative for right femoral fracture        Lucency right fibular neck suspicious for fracture        Workstation performed: [id number]           XR tibia fibula 2 views RIGHT   ED Interpretation by Razia Buckner DO (11/03 1542)   Proximal fibular fracture       Final Result by Laurie Byers DO (11/03 1715)       Nondisplaced proximal fibular fracture        Questionable findings for fracture about the ankle    Recommend dedicated coned-down right ankle views        The study was marked in [facility] for immediate notification        Workstation performed: [id number]           VAS lower limb venous duplex study, unilateral/limited    (Results Pending)           Results from last 7 days   Lab Units 11/03/22  2110   SARS-COV-2   Positive*            Results from last 7 days   Lab Units 11/04/22  0536 11/03/22  1658   WBC Thousand/uL 8 66 8 62   HEMOGLOBIN g/dL 7  1* 7 2*   HEMATOCRIT % 21 5* 22 3*   PLATELETS Thousands/uL 149 183   NEUTROS ABS Thousands/µL  --  6 60                Results from last 7 days   Lab Units 11/04/22  0536 11/03/22  1546   SODIUM mmol/L 144 141   POTASSIUM mmol/L 3 9 5 2   CHLORIDE mmol/L 109* 107   CO2 mmol/L 26 26   ANION GAP mmol/L 9 8   BUN mg/dL 17 20   CREATININE mg/dL 1 00 1 06   EGFR ml/min/1 73sq m 53 50   CALCIUM mg/dL 7 8* 7 8*            Results from last 7 days   Lab Units 11/04/22  0536 11/03/22  1546   AST U/L 135*  --    ALT U/L 31 29   ALK PHOS U/L 147* 152*   TOTAL PROTEIN g/dL 5 6* 5 7*   ALBUMIN g/dL 2 5* 2 4*   TOTAL BILIRUBIN mg/dL 0 81 0 78                Results from last 7 days   Lab Units 11/04/22  0536 11/03/22  1546   GLUCOSE RANDOM mg/dL 66 110                     Results from last 7 days   Lab Units 11/03/22  1546   PROTIME seconds 13 7   INR   1 05   PTT seconds 23                 Results from last 7 days   Lab Units 11/03/22  2110   INFLUENZA A PCR   Negative   INFLUENZA B PCR   Negative   RSV PCR   Negative               ED Treatment:              Medication Administration from 11/03/2022 1405 to 11/03/2022 1955        Date/Time Order Dose Route Action Comments       11/03/2022 1801 OLANZapine (ZyPREXA) IM injection 5 mg 5 mg Intramuscular Given         11/03/2022 1914 OLANZapine (ZyPREXA) IM injection 5 mg 5 mg Intramuscular Given            Present on Admission:  • Atrial fibrillation (HCC)  • Chronic systolic heart failure (HCC)  • COVID-19 virus detected  • Essential hypertension  • Seizure disorder ([facility] [address] )  • 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  • Fracture of right lower extremity  • Acute blood loss anemia (ABLA)        Admitting Diagnosis: Acute blood loss anemia [D62]  Swollen leg [M79 89]  Closed fracture of proximal end of right fibula, unspecified fracture morphology, initial encounter [S82 831A]  Nondisplaced fracture of medial malleolus of right tibia, initial encounter for closed fracture [S82 54XA]  Age/Sex: 77 y o  female  Admission Orders:  Scheduled Medications:  acetaminophen, 650 mg, Oral, Q12H  allopurinol, 100 mg, Oral, Daily  amLODIPine, 5 mg, Oral, Daily  ascorbic acid, 500 mg, Oral, Daily  aspirin, 81 mg, Oral, Every Other Day  atorvastatin, 10 mg, Oral, Daily With Dinner  b complex-vitamin C-folic acid, 1 capsule, Oral, Daily  cholecalciferol, 1,000 Units, Oral, Daily  famotidine, 20 mg, Oral, BID  heparin (porcine), 5,000 Units, Subcutaneous, Q8H ANUPAM  lamoTRIgine, 250 mg, Oral, BID  levothyroxine, 50 mcg, Oral, Early Morning  phenytoin, 100 mg, Oral, Once per day on Mon Wed Fri  [START ON 11/5/2022] phenytoin, 200 mg, Oral, Once per day on Sun Tue Thu Sat  predniSONE, 7 5 mg, Oral, Daily  QUEtiapine, 12 5 mg, Oral, BID  zinc sulfate, 220 mg, Oral, Daily        Continuous IV Infusions:  PRN Meds:  ipratropium-albuterol, 3 mL, Nebulization, Q8H PRN           IP CONSULT TO ORTHOPEDIC SURGERY     Network Utilization Review Department  ATTENTION: Please call with any questions or concerns to [phone] and carefully listen to the prompts so that you are directed to the right person  All voicemails are confidential   Cassidy Bond all requests for admission clinical reviews, approved or denied determinations and any other requests to dedicated fax number below belonging to the campus where the patient is receiving treatment   List of dedicated fax numbers for the Facilities:  [address] DENIALS (Administrative/Medical Necessity) [phone]   [address] (Maternity/NICU/Pediatrics) [phone]   [address] [phone]   [facility] [phone]   [facility] [phone]   [id number] [place] [address] [address] [facility] 301 [facility] [phone] [address] [place] [address] 815 [facility] [phone]                   Revision History